# Patient Record
Sex: MALE | Race: WHITE | Employment: OTHER | URBAN - METROPOLITAN AREA
[De-identification: names, ages, dates, MRNs, and addresses within clinical notes are randomized per-mention and may not be internally consistent; named-entity substitution may affect disease eponyms.]

---

## 2017-01-04 ENCOUNTER — TELEPHONE (OUTPATIENT)
Dept: CARDIOLOGY CLINIC | Age: 67
End: 2017-01-04

## 2017-01-04 DIAGNOSIS — B35.4 TINEA CORPORIS: ICD-10-CM

## 2017-01-04 DIAGNOSIS — I10 ESSENTIAL HYPERTENSION, BENIGN: ICD-10-CM

## 2017-01-04 DIAGNOSIS — E78.2 MIXED HYPERLIPIDEMIA: ICD-10-CM

## 2017-01-04 DIAGNOSIS — I25.10 ATHEROSCLEROSIS OF NATIVE CORONARY ARTERY OF NATIVE HEART WITHOUT ANGINA PECTORIS: ICD-10-CM

## 2017-01-04 DIAGNOSIS — K29.70 GASTRITIS: ICD-10-CM

## 2017-01-04 RX ORDER — SIMVASTATIN 80 MG
TABLET ORAL
Qty: 90 TABLET | Refills: 3 | Status: CANCELLED | OUTPATIENT
Start: 2017-01-04

## 2017-01-05 RX ORDER — ATORVASTATIN CALCIUM 80 MG/1
80 TABLET, FILM COATED ORAL DAILY
Qty: 30 TABLET | Refills: 3 | Status: SHIPPED | OUTPATIENT
Start: 2017-01-05 | End: 2017-01-10 | Stop reason: SDUPTHER

## 2017-01-06 ENCOUNTER — TELEPHONE (OUTPATIENT)
Dept: CARDIOLOGY CLINIC | Age: 67
End: 2017-01-06

## 2017-01-10 ENCOUNTER — TELEPHONE (OUTPATIENT)
Dept: CARDIOLOGY CLINIC | Age: 67
End: 2017-01-10

## 2017-01-10 ENCOUNTER — PATIENT MESSAGE (OUTPATIENT)
Dept: FAMILY MEDICINE CLINIC | Age: 67
End: 2017-01-10

## 2017-01-10 DIAGNOSIS — I25.10 ATHEROSCLEROSIS OF NATIVE CORONARY ARTERY OF NATIVE HEART, ANGINA PRESENCE UNSPECIFIED: ICD-10-CM

## 2017-01-10 DIAGNOSIS — K21.9 GASTROESOPHAGEAL REFLUX DISEASE WITHOUT ESOPHAGITIS: ICD-10-CM

## 2017-01-10 DIAGNOSIS — E78.5 HYPERLIPIDEMIA, UNSPECIFIED HYPERLIPIDEMIA TYPE: Primary | ICD-10-CM

## 2017-01-10 RX ORDER — ATORVASTATIN CALCIUM 80 MG/1
80 TABLET, FILM COATED ORAL DAILY
Qty: 30 TABLET | Refills: 3 | Status: SHIPPED | OUTPATIENT
Start: 2017-01-10 | End: 2017-05-10 | Stop reason: SDUPTHER

## 2017-01-11 RX ORDER — LANSOPRAZOLE 30 MG/1
CAPSULE, DELAYED RELEASE ORAL
Qty: 90 CAPSULE | Refills: 3 | Status: SHIPPED | OUTPATIENT
Start: 2017-01-11 | End: 2019-01-17 | Stop reason: SDUPTHER

## 2017-01-20 DIAGNOSIS — I10 UNSPECIFIED ESSENTIAL HYPERTENSION: ICD-10-CM

## 2017-01-20 RX ORDER — ATENOLOL 100 MG/1
TABLET ORAL
Qty: 90 TABLET | Refills: 3 | Status: SHIPPED | OUTPATIENT
Start: 2017-01-20 | End: 2018-01-04 | Stop reason: SDUPTHER

## 2017-05-10 DIAGNOSIS — E78.5 HYPERLIPIDEMIA, UNSPECIFIED HYPERLIPIDEMIA TYPE: ICD-10-CM

## 2017-05-10 DIAGNOSIS — I25.10 ATHEROSCLEROSIS OF NATIVE CORONARY ARTERY OF NATIVE HEART, ANGINA PRESENCE UNSPECIFIED: ICD-10-CM

## 2017-05-11 RX ORDER — ATORVASTATIN CALCIUM 80 MG/1
TABLET, FILM COATED ORAL
Qty: 30 TABLET | Refills: 2 | Status: SHIPPED | OUTPATIENT
Start: 2017-05-11 | End: 2017-06-20 | Stop reason: SDUPTHER

## 2017-06-12 ENCOUNTER — TELEPHONE (OUTPATIENT)
Dept: CARDIOLOGY CLINIC | Age: 67
End: 2017-06-12

## 2017-06-14 DIAGNOSIS — E78.5 HYPERLIPIDEMIA, UNSPECIFIED HYPERLIPIDEMIA TYPE: ICD-10-CM

## 2017-06-14 DIAGNOSIS — I10 ESSENTIAL HYPERTENSION, BENIGN: ICD-10-CM

## 2017-06-14 DIAGNOSIS — I25.10 ATHEROSCLEROSIS OF NATIVE CORONARY ARTERY OF NATIVE HEART, ANGINA PRESENCE UNSPECIFIED: ICD-10-CM

## 2017-06-14 LAB
ALT SERPL-CCNC: 42 U/L (ref 10–40)
ANION GAP SERPL CALCULATED.3IONS-SCNC: 17 MMOL/L (ref 3–16)
AST SERPL-CCNC: 35 U/L (ref 15–37)
BUN BLDV-MCNC: 16 MG/DL (ref 7–20)
CALCIUM SERPL-MCNC: 9.9 MG/DL (ref 8.3–10.6)
CHLORIDE BLD-SCNC: 99 MMOL/L (ref 99–110)
CHOLESTEROL, TOTAL: 136 MG/DL (ref 0–199)
CO2: 23 MMOL/L (ref 21–32)
CREAT SERPL-MCNC: 1 MG/DL (ref 0.8–1.3)
GFR AFRICAN AMERICAN: >60
GFR NON-AFRICAN AMERICAN: >60
GLUCOSE BLD-MCNC: 107 MG/DL (ref 70–99)
HDLC SERPL-MCNC: 55 MG/DL (ref 40–60)
LDL CHOLESTEROL CALCULATED: 65 MG/DL
POTASSIUM SERPL-SCNC: 5.1 MMOL/L (ref 3.5–5.1)
SODIUM BLD-SCNC: 139 MMOL/L (ref 136–145)
TRIGL SERPL-MCNC: 80 MG/DL (ref 0–150)
VLDLC SERPL CALC-MCNC: 16 MG/DL

## 2017-06-19 ENCOUNTER — TELEPHONE (OUTPATIENT)
Dept: CARDIOLOGY CLINIC | Age: 67
End: 2017-06-19

## 2017-06-20 ENCOUNTER — OFFICE VISIT (OUTPATIENT)
Dept: CARDIOLOGY CLINIC | Age: 67
End: 2017-06-20

## 2017-06-20 ENCOUNTER — HOSPITAL ENCOUNTER (OUTPATIENT)
Dept: OTHER | Age: 67
Discharge: HOME | End: 2017-06-20

## 2017-06-20 ENCOUNTER — HOSPITAL ENCOUNTER (OUTPATIENT)
Dept: NON INVASIVE DIAGNOSTICS | Age: 67
Discharge: HOME | End: 2017-06-20
Attending: INTERNAL MEDICINE | Admitting: INTERNAL MEDICINE

## 2017-06-20 VITALS
WEIGHT: 242.3 LBS | OXYGEN SATURATION: 94 % | HEART RATE: 68 BPM | BODY MASS INDEX: 33.92 KG/M2 | HEIGHT: 71 IN | SYSTOLIC BLOOD PRESSURE: 104 MMHG | DIASTOLIC BLOOD PRESSURE: 64 MMHG

## 2017-06-20 DIAGNOSIS — E78.5 HYPERLIPIDEMIA, UNSPECIFIED HYPERLIPIDEMIA TYPE: ICD-10-CM

## 2017-06-20 DIAGNOSIS — I25.10 ATHEROSCLEROSIS OF NATIVE CORONARY ARTERY OF NATIVE HEART, ANGINA PRESENCE UNSPECIFIED: ICD-10-CM

## 2017-06-20 DIAGNOSIS — I25.10 ATHEROSCLEROTIC HEART DISEASE OF NATIVE CORONARY ARTERY WITHOUT ANGINA PECTORIS: ICD-10-CM

## 2017-06-20 DIAGNOSIS — Z13.6 SCREENING FOR AAA (ABDOMINAL AORTIC ANEURYSM): Primary | ICD-10-CM

## 2017-06-20 PROCEDURE — 99213 OFFICE O/P EST LOW 20 MIN: CPT | Performed by: INTERNAL MEDICINE

## 2017-06-20 RX ORDER — ATORVASTATIN CALCIUM 80 MG/1
TABLET, FILM COATED ORAL
Qty: 90 TABLET | Refills: 3 | Status: SHIPPED | OUTPATIENT
Start: 2017-06-20 | End: 2017-08-07 | Stop reason: SDUPTHER

## 2017-06-23 ENCOUNTER — HOSPITAL ENCOUNTER (OUTPATIENT)
Dept: ULTRASOUND IMAGING | Age: 67
Discharge: HOME | End: 2017-06-23
Attending: INTERNAL MEDICINE | Admitting: INTERNAL MEDICINE

## 2017-06-23 DIAGNOSIS — Z13.6 SCREENING FOR ABDOMINAL AORTIC ANEURYSM: ICD-10-CM

## 2017-06-23 DIAGNOSIS — Z13.6 ENCOUNTER FOR SCREENING FOR CARDIOVASCULAR DISORDERS: ICD-10-CM

## 2017-06-24 ENCOUNTER — HOSPITAL ENCOUNTER (OUTPATIENT)
Dept: OTHER | Age: 67
Discharge: HOME | End: 2017-06-24
Attending: INTERNAL MEDICINE | Admitting: INTERNAL MEDICINE

## 2017-06-24 ENCOUNTER — HOSPITAL ENCOUNTER (OUTPATIENT)
Dept: ULTRASOUND IMAGING | Age: 67
Discharge: HOME | End: 2017-06-24
Admitting: INTERNAL MEDICINE

## 2017-06-24 DIAGNOSIS — Z13.6 ENCOUNTER FOR SCREENING FOR CARDIOVASCULAR DISORDERS: ICD-10-CM

## 2017-06-24 DIAGNOSIS — Z13.6 SCREENING FOR ABDOMINAL AORTIC ANEURYSM: ICD-10-CM

## 2017-07-05 ENCOUNTER — TELEPHONE (OUTPATIENT)
Dept: CARDIOLOGY CLINIC | Age: 67
End: 2017-07-05

## 2017-08-07 DIAGNOSIS — I25.10 ATHEROSCLEROSIS OF NATIVE CORONARY ARTERY OF NATIVE HEART, ANGINA PRESENCE UNSPECIFIED: ICD-10-CM

## 2017-08-07 DIAGNOSIS — E78.5 HYPERLIPIDEMIA, UNSPECIFIED HYPERLIPIDEMIA TYPE: ICD-10-CM

## 2017-08-07 RX ORDER — ATORVASTATIN CALCIUM 80 MG/1
TABLET, FILM COATED ORAL
Qty: 90 TABLET | Refills: 3 | Status: SHIPPED | OUTPATIENT
Start: 2017-08-07 | End: 2018-07-31 | Stop reason: SDUPTHER

## 2017-11-01 DIAGNOSIS — I10 ESSENTIAL HYPERTENSION: ICD-10-CM

## 2017-11-03 RX ORDER — AMLODIPINE BESYLATE 5 MG/1
TABLET ORAL
Qty: 90 TABLET | Refills: 3 | Status: SHIPPED | OUTPATIENT
Start: 2017-11-03 | End: 2018-11-06 | Stop reason: SDUPTHER

## 2017-11-03 RX ORDER — LISINOPRIL 40 MG/1
TABLET ORAL
Qty: 90 TABLET | Refills: 3 | Status: SHIPPED | OUTPATIENT
Start: 2017-11-03 | End: 2018-10-28 | Stop reason: SDUPTHER

## 2018-01-04 DIAGNOSIS — I10 ESSENTIAL HYPERTENSION: ICD-10-CM

## 2018-01-05 RX ORDER — ATENOLOL 100 MG/1
TABLET ORAL
Qty: 90 TABLET | Refills: 3 | Status: SHIPPED | OUTPATIENT
Start: 2018-01-05 | End: 2018-12-16 | Stop reason: SDUPTHER

## 2018-07-31 DIAGNOSIS — I25.10 ATHEROSCLEROSIS OF NATIVE CORONARY ARTERY OF NATIVE HEART, ANGINA PRESENCE UNSPECIFIED: ICD-10-CM

## 2018-07-31 DIAGNOSIS — E78.5 HYPERLIPIDEMIA, UNSPECIFIED HYPERLIPIDEMIA TYPE: ICD-10-CM

## 2018-07-31 RX ORDER — ATORVASTATIN CALCIUM 80 MG/1
40 TABLET, FILM COATED ORAL DAILY
Qty: 90 TABLET | Refills: 2 | Status: SHIPPED | OUTPATIENT
Start: 2018-07-31 | End: 2019-10-18 | Stop reason: SDUPTHER

## 2018-08-07 DIAGNOSIS — E78.5 HYPERLIPIDEMIA, UNSPECIFIED HYPERLIPIDEMIA TYPE: Primary | ICD-10-CM

## 2018-10-09 ENCOUNTER — OFFICE VISIT (OUTPATIENT)
Dept: FAMILY MEDICINE CLINIC | Age: 68
End: 2018-10-09
Payer: MEDICARE

## 2018-10-09 VITALS
HEIGHT: 71 IN | WEIGHT: 251.6 LBS | SYSTOLIC BLOOD PRESSURE: 110 MMHG | HEART RATE: 59 BPM | TEMPERATURE: 97.6 F | DIASTOLIC BLOOD PRESSURE: 62 MMHG | BODY MASS INDEX: 35.22 KG/M2 | OXYGEN SATURATION: 96 %

## 2018-10-09 DIAGNOSIS — J06.9 PROTRACTED URI: Primary | ICD-10-CM

## 2018-10-09 PROCEDURE — 3288F FALL RISK ASSESSMENT DOCD: CPT | Performed by: NURSE PRACTITIONER

## 2018-10-09 PROCEDURE — G8510 SCR DEP NEG, NO PLAN REQD: HCPCS | Performed by: NURSE PRACTITIONER

## 2018-10-09 PROCEDURE — 99213 OFFICE O/P EST LOW 20 MIN: CPT | Performed by: NURSE PRACTITIONER

## 2018-10-09 RX ORDER — AZITHROMYCIN 250 MG/1
TABLET, FILM COATED ORAL
Qty: 1 PACKET | Refills: 0 | Status: SHIPPED | OUTPATIENT
Start: 2018-10-09 | End: 2018-10-17 | Stop reason: ALTCHOICE

## 2018-10-09 ASSESSMENT — PATIENT HEALTH QUESTIONNAIRE - PHQ9
SUM OF ALL RESPONSES TO PHQ9 QUESTIONS 1 & 2: 0
SUM OF ALL RESPONSES TO PHQ QUESTIONS 1-9: 0
1. LITTLE INTEREST OR PLEASURE IN DOING THINGS: 0
2. FEELING DOWN, DEPRESSED OR HOPELESS: 0
SUM OF ALL RESPONSES TO PHQ QUESTIONS 1-9: 0

## 2018-10-09 ASSESSMENT — ENCOUNTER SYMPTOMS
RHINORRHEA: 1
SORE THROAT: 1
WHEEZING: 0
SHORTNESS OF BREATH: 0
COUGH: 1
CHEST TIGHTNESS: 0
SINUS PRESSURE: 1
VOICE CHANGE: 1

## 2018-10-17 ENCOUNTER — OFFICE VISIT (OUTPATIENT)
Dept: CARDIOLOGY CLINIC | Age: 68
End: 2018-10-17
Payer: MEDICARE

## 2018-10-17 VITALS
RESPIRATION RATE: 14 BRPM | WEIGHT: 253 LBS | SYSTOLIC BLOOD PRESSURE: 112 MMHG | BODY MASS INDEX: 35.42 KG/M2 | DIASTOLIC BLOOD PRESSURE: 76 MMHG | HEART RATE: 50 BPM | HEIGHT: 71 IN | OXYGEN SATURATION: 95 %

## 2018-10-17 DIAGNOSIS — I10 ESSENTIAL HYPERTENSION, BENIGN: ICD-10-CM

## 2018-10-17 DIAGNOSIS — E78.5 HYPERLIPIDEMIA, UNSPECIFIED HYPERLIPIDEMIA TYPE: ICD-10-CM

## 2018-10-17 DIAGNOSIS — I25.10 ATHEROSCLEROSIS OF NATIVE CORONARY ARTERY OF NATIVE HEART, ANGINA PRESENCE UNSPECIFIED: Primary | ICD-10-CM

## 2018-10-17 PROCEDURE — 99213 OFFICE O/P EST LOW 20 MIN: CPT | Performed by: INTERNAL MEDICINE

## 2018-10-17 NOTE — PROGRESS NOTES
reports that he does not use drugs. Family History:  family history includes Cancer in his father; Heart Attack in his mother; Heart Disease in his father and mother; High Blood Pressure in his father and mother; High Cholesterol in his father and mother; Hypertension in his father. Home Medications:  Current Outpatient Prescriptions   Medication Sig Dispense Refill    atorvastatin (LIPITOR) 80 MG tablet Take 0.5 tablets by mouth daily 90 tablet 2    atenolol (TENORMIN) 100 MG tablet TAKE 1 TABLET BY MOUTH EVERY DAY 90 tablet 3    lisinopril (PRINIVIL;ZESTRIL) 40 MG tablet TAKE 1 TABLET BY MOUTH EVERY DAY 90 tablet 3    amLODIPine (NORVASC) 5 MG tablet TAKE 1 TABLET BY MOUTH DAILY 90 tablet 3    lansoprazole (PREVACID) 30 MG delayed release capsule TAKE ONE CAPSULE BY MOUTH EVERY DAY 90 capsule 3    cetirizine (ZYRTEC) 10 MG tablet Take 10 mg by mouth every evening       therapeutic multivitamin-minerals (THERAGRAN-M) tablet Take 1 tablet by mouth daily.  aspirin 81 MG EC tablet Take 81 mg by mouth daily. No current facility-administered medications for this visit. Allergies:  Adhesive tape     Review of Systems:   · Constitutional: there has been no unanticipated weight loss. There's been no change in energy level, sleep pattern, or activity level. · Eyes: No visual changes or diplopia. No scleral icterus. · ENT: No Headaches, hearing loss or vertigo. No mouth sores or sore throat. · Cardiovascular: Reviewed in HPI  · Respiratory: No cough or wheezing, no sputum production. No hematemesis. · Gastrointestinal: No abdominal pain, appetite loss, blood in stools. No change in bowel or bladder habits. · Genitourinary: No dysuria, trouble voiding, or hematuria. · Musculoskeletal:  No gait disturbance, weakness or joint complaints. · Integumentary: No rash or pruritis. · Neurological: No headache, diplopia, change in muscle strength, numbness or tingling.  No change in gait, changes  4. Follow up in 12 months. Thank you for allowing me to participate in the care of this individual.    Angelica Lopez M.D., [address], [address] attestation: This note was scribed in the presence of Dr. Venessa Gloria MD, by Andres Clinton RN. Provider Geoffrey Tran is working as a scribe for and in the presence of ekny Ortiz MD). Working as a scribe, Sandipmiriam Dill may have prepopulated components of this note with my historical  intellectual property under my direct supervision. Any additions to this intellectual property were performed in my presence and at my direction. Furthermore, the content and accuracy of this note have been reviewed by keny Ortiz MD).

## 2018-10-17 NOTE — LETTER
No abnormalities of mood, affect, memory      Assessment:     Other and unspecified hyperlipidemia  1/2016: Total 128 HDL 51 LDL 51 Trig 132  Lipids and Liver in 3 months    Essential hypertension, benign  /76 (Site: Right Upper Arm, Position: Sitting, Cuff Size: Medium Adult)   Pulse 50   Resp 14   Ht 5' 11\" (1.803 m)   Wt 253 lb (114.8 kg)   SpO2 95%   BMI 35.29 kg/m²   Stable. BMP in 3 months    Coronary Artery Disease  Hx of IWMI with RCA stenting 01/2000.    1/11/12  myoview fixed defect, normal EF. Plan:  1. Advised to increase exercise in half-way  2. Will have fasting lipids, liver enzymes, BMP  3. No med changes  4. Follow up in 12 months. Thank you for allowing me to participate in the care of this individual.    Tabitha Reynolds M.D., [facility], [address] [facility] attestation: This note was scribed in the presence of Dr. Srinivasan Mayberry MD, by Malachi Montaño RN. Provider Arley Lloyd is working as a scribe for and in the presence of me Augustus Talavera MD). Working as a scribe, Devon Benoit may have prepopulated components of this note with my historical  intellectual property under my direct supervision. Any additions to this intellectual property were performed in my presence and at my direction. Furthermore, the content and accuracy of this note have been reviewed by me Augustus Talavera MD). If you have questions, please do not hesitate to call me. I look forward to following Jarrod Mobley along with you.     Sincerely,        Tabitha Reynolds MD

## 2018-10-28 DIAGNOSIS — I10 ESSENTIAL HYPERTENSION: ICD-10-CM

## 2018-10-29 RX ORDER — LISINOPRIL 40 MG/1
TABLET ORAL
Qty: 90 TABLET | Refills: 3 | Status: SHIPPED | OUTPATIENT
Start: 2018-10-29 | End: 2018-11-20 | Stop reason: SDUPTHER

## 2018-11-06 RX ORDER — AMLODIPINE BESYLATE 5 MG/1
5 TABLET ORAL DAILY
Qty: 90 TABLET | Refills: 0 | Status: SHIPPED | OUTPATIENT
Start: 2018-11-06 | End: 2018-11-20 | Stop reason: SDUPTHER

## 2018-11-10 ENCOUNTER — HOSPITAL ENCOUNTER (OUTPATIENT)
Age: 68
Discharge: HOME | End: 2018-11-10
Payer: MEDICARE

## 2018-11-10 DIAGNOSIS — E78.5 HYPERLIPIDEMIA, UNSPECIFIED HYPERLIPIDEMIA TYPE: ICD-10-CM

## 2018-11-10 LAB
ALT SERPL-CCNC: 31 U/L (ref 10–40)
AST SERPL-CCNC: 29 U/L (ref 15–37)
CHOLESTEROL, TOTAL: 131 MG/DL (ref 0–199)
HDLC SERPL-MCNC: 49 MG/DL (ref 40–60)
LDL CHOLESTEROL CALCULATED: 66 MG/DL
TRIGL SERPL-MCNC: 80 MG/DL (ref 0–150)
VLDLC SERPL CALC-MCNC: 16 MG/DL

## 2018-11-10 PROCEDURE — 36415 COLL VENOUS BLD VENIPUNCTURE: CPT

## 2018-11-10 PROCEDURE — 80061 LIPID PANEL: CPT

## 2018-11-10 PROCEDURE — 84450 TRANSFERASE (AST) (SGOT): CPT

## 2018-11-10 PROCEDURE — 84460 ALANINE AMINO (ALT) (SGPT): CPT

## 2018-11-20 DIAGNOSIS — I10 ESSENTIAL HYPERTENSION: ICD-10-CM

## 2018-11-20 RX ORDER — AMLODIPINE BESYLATE 5 MG/1
5 TABLET ORAL DAILY
Qty: 90 TABLET | Refills: 3 | Status: SHIPPED | OUTPATIENT
Start: 2018-11-20 | End: 2019-01-17 | Stop reason: SDUPTHER

## 2018-11-20 RX ORDER — LISINOPRIL 40 MG/1
40 TABLET ORAL DAILY
Qty: 90 TABLET | Refills: 3 | Status: SHIPPED | OUTPATIENT
Start: 2018-11-20 | End: 2020-02-04

## 2018-12-16 DIAGNOSIS — I10 ESSENTIAL HYPERTENSION: ICD-10-CM

## 2018-12-17 RX ORDER — ATENOLOL 100 MG/1
TABLET ORAL
Qty: 90 TABLET | Refills: 3 | Status: SHIPPED | OUTPATIENT
Start: 2018-12-17 | End: 2019-10-18 | Stop reason: SDUPTHER

## 2018-12-17 NOTE — TELEPHONE ENCOUNTER
Last appt on 10/17/2018    Told to follow up in 1 year    11/10/2018  3:53 PM - Karlee Lawton Incoming Lab Results From Soft (Epic Adt)     Component Results     Component Value Ref Range & Units Status Collected Lab   Cholesterol, Total 131  0 - 199 mg/dL Final 11/10/2018  7:21 AM [address] Lab   Triglycerides 80  0 - 150 mg/dL Final 11/10/2018  7:21 AM [address] Lab   HDL 49  40 - 60 mg/dL Final 11/10/2018  7:21 AM [address] Lab   LDL Calculated 66  <100 mg/dL Final 11/10/2018  7:21 AM [facility]   VLDL Cholesterol Calculated 16  Not Established mg/dL Final 11/10/2018  7:21 AM [address] Lab   Testing Performed By     Isis Leon Name Director Address Valid Date Range   19- - Aureliano [facility] Santiago Martino M.D., Ph.D. [facility].   [facility] [id number] 08/30/17 1217-Present     11/10/2018  3:53 PM - Karlee Lawton Incoming Lab Results From Soft (Epic Adt)     Component Results     Component Value Ref Range & Units Status Collected Lab   AST 29  15 - 37 U/L Final 11/10/2018  7:21 AM [address] Lab     11/10/2018  3:53 PM - Karlee Lawton Incoming Lab Results From Soft (Epic Adt)     Component Results     Component Value Ref Range & Units Status Collected Lab   ALT 31  10 - 40 U/L Final 11/10/2018  7:21 AM [address] Lab

## 2018-12-18 ENCOUNTER — OFFICE VISIT (OUTPATIENT)
Dept: FAMILY MEDICINE CLINIC | Age: 68
End: 2018-12-18
Payer: MEDICARE

## 2018-12-18 VITALS
BODY MASS INDEX: 35.68 KG/M2 | DIASTOLIC BLOOD PRESSURE: 78 MMHG | WEIGHT: 255.8 LBS | TEMPERATURE: 99.3 F | OXYGEN SATURATION: 98 % | SYSTOLIC BLOOD PRESSURE: 118 MMHG | HEART RATE: 66 BPM

## 2018-12-18 DIAGNOSIS — B96.89 ACUTE BACTERIAL SINUSITIS: Primary | ICD-10-CM

## 2018-12-18 DIAGNOSIS — J01.90 ACUTE BACTERIAL SINUSITIS: Primary | ICD-10-CM

## 2018-12-18 PROCEDURE — 99213 OFFICE O/P EST LOW 20 MIN: CPT | Performed by: NURSE PRACTITIONER

## 2018-12-18 RX ORDER — AZITHROMYCIN 250 MG/1
TABLET, FILM COATED ORAL
Qty: 1 PACKET | Refills: 0 | Status: SHIPPED | OUTPATIENT
Start: 2018-12-18 | End: 2019-01-17

## 2018-12-18 ASSESSMENT — ENCOUNTER SYMPTOMS
COUGH: 1
SINUS COMPLAINT: 1
WHEEZING: 0
SORE THROAT: 1
CHEST TIGHTNESS: 1
SHORTNESS OF BREATH: 1
SINUS PAIN: 1
SINUS PRESSURE: 1
RHINORRHEA: 1

## 2018-12-18 NOTE — PROGRESS NOTES
sore throat. Respiratory: Positive for cough, chest tightness (first, since resolved) and shortness of breath (first night, since resolved). Negative for wheezing. Neurological: Positive for headaches. Negative for dizziness. Physical Exam   Constitutional: He is oriented to person, place, and time. He appears well-developed and well-nourished. HENT:   Right Ear: Tympanic membrane normal.   Left Ear: Tympanic membrane normal.   Nose: Nose normal.   Mouth/Throat: Uvula is midline and mucous membranes are normal. Posterior oropharyngeal erythema present. Cardiovascular: Normal rate, regular rhythm and normal heart sounds. Pulmonary/Chest: Effort normal and breath sounds normal.   Lymphadenopathy:     He has no cervical adenopathy. Neurological: He is alert and oriented to person, place, and time. Skin: Skin is warm and dry. Vitals reviewed. /78   Pulse 66   Temp 99.3 °F (37.4 °C)   Wt 255 lb 12.8 oz (116 kg)   SpO2 98%   BMI 35.68 kg/m²       Assessment/Plan    1. Acute bacterial sinusitis  - azithromycin (ZITHROMAX) 250 MG tablet; Take 2 tabs (500 mg) on Day 1, and take 1 tab (250 mg) on days 2 through 5. Dispense: 1 packet; Refill: 0  Symtomatic treatment: Tylenol / Advil, rest, salt water gargles, increase fluids. May also use:Robitussin DM or Delsym. .  See pt instructions  F/u 5-7 days if no improvement, sooner if symptoms worsen. Discussed use, benefit, and side effects of prescribed medications. All patient questions answered. Pt voiced understanding.

## 2019-01-02 ENCOUNTER — TELEPHONE (OUTPATIENT)
Dept: FAMILY MEDICINE CLINIC | Age: 69
End: 2019-01-02

## 2019-01-02 DIAGNOSIS — I10 ESSENTIAL HYPERTENSION, BENIGN: ICD-10-CM

## 2019-01-02 DIAGNOSIS — Z00.00 WELL ADULT EXAM: ICD-10-CM

## 2019-01-02 DIAGNOSIS — Z12.5 SCREENING FOR PROSTATE CANCER: Primary | ICD-10-CM

## 2019-01-02 DIAGNOSIS — R39.11 URINARY HESITANCY: ICD-10-CM

## 2019-01-11 DIAGNOSIS — I10 ESSENTIAL HYPERTENSION, BENIGN: ICD-10-CM

## 2019-01-11 DIAGNOSIS — R39.11 URINARY HESITANCY: ICD-10-CM

## 2019-01-11 DIAGNOSIS — Z12.5 SCREENING FOR PROSTATE CANCER: ICD-10-CM

## 2019-01-11 LAB
A/G RATIO: 1.6 (ref 1.1–2.2)
ALBUMIN SERPL-MCNC: 4.4 G/DL (ref 3.4–5)
ALP BLD-CCNC: 73 U/L (ref 40–129)
ALT SERPL-CCNC: 36 U/L (ref 10–40)
ANION GAP SERPL CALCULATED.3IONS-SCNC: 13 MMOL/L (ref 3–16)
AST SERPL-CCNC: 25 U/L (ref 15–37)
BILIRUB SERPL-MCNC: 0.7 MG/DL (ref 0–1)
BUN BLDV-MCNC: 12 MG/DL (ref 7–20)
CALCIUM SERPL-MCNC: 9.6 MG/DL (ref 8.3–10.6)
CHLORIDE BLD-SCNC: 98 MMOL/L (ref 99–110)
CHOLESTEROL, TOTAL: 140 MG/DL (ref 0–199)
CO2: 26 MMOL/L (ref 21–32)
CREAT SERPL-MCNC: 0.9 MG/DL (ref 0.8–1.3)
GFR AFRICAN AMERICAN: >60
GFR NON-AFRICAN AMERICAN: >60
GLOBULIN: 2.8 G/DL
GLUCOSE BLD-MCNC: 97 MG/DL (ref 70–99)
HDLC SERPL-MCNC: 52 MG/DL (ref 40–60)
LDL CHOLESTEROL CALCULATED: 65 MG/DL
POTASSIUM SERPL-SCNC: 5.1 MMOL/L (ref 3.5–5.1)
PROSTATE SPECIFIC ANTIGEN: 0.52 NG/ML (ref 0–4)
SODIUM BLD-SCNC: 137 MMOL/L (ref 136–145)
TOTAL PROTEIN: 7.2 G/DL (ref 6.4–8.2)
TRIGL SERPL-MCNC: 115 MG/DL (ref 0–150)
TSH REFLEX: 1.55 UIU/ML (ref 0.27–4.2)
VLDLC SERPL CALC-MCNC: 23 MG/DL

## 2019-01-14 LAB
ATYPICAL LYMPHOCYTE RELATIVE PERCENT: 1 % (ref 0–6)
BASOPHILS ABSOLUTE: 0.1 K/UL (ref 0–0.2)
BASOPHILS RELATIVE PERCENT: 1 %
EOSINOPHILS ABSOLUTE: 0.1 K/UL (ref 0–0.6)
EOSINOPHILS RELATIVE PERCENT: 1 %
HCT VFR BLD CALC: 45.5 % (ref 40.5–52.5)
HEMATOLOGY PATH CONSULT: NO
HEMOGLOBIN: 15.1 G/DL (ref 13.5–17.5)
LYMPHOCYTES ABSOLUTE: 5.7 K/UL (ref 1–5.1)
LYMPHOCYTES RELATIVE PERCENT: 48 %
MCH RBC QN AUTO: 30.2 PG (ref 26–34)
MCHC RBC AUTO-ENTMCNC: 33.3 G/DL (ref 31–36)
MCV RBC AUTO: 90.7 FL (ref 80–100)
MONOCYTES ABSOLUTE: 0.9 K/UL (ref 0–1.3)
MONOCYTES RELATIVE PERCENT: 8 %
NEUTROPHILS ABSOLUTE: 4.8 K/UL (ref 1.7–7.7)
NEUTROPHILS RELATIVE PERCENT: 41 %
PDW BLD-RTO: 14.2 % (ref 12.4–15.4)
PLATELET # BLD: 303 K/UL (ref 135–450)
PMV BLD AUTO: 9.3 FL (ref 5–10.5)
RBC # BLD: 5.01 M/UL (ref 4.2–5.9)
SLIDE REVIEW: ABNORMAL
WBC # BLD: 11.7 K/UL (ref 4–11)

## 2019-01-17 ENCOUNTER — OFFICE VISIT (OUTPATIENT)
Dept: FAMILY MEDICINE CLINIC | Age: 69
End: 2019-01-17
Payer: MEDICARE

## 2019-01-17 VITALS
SYSTOLIC BLOOD PRESSURE: 140 MMHG | BODY MASS INDEX: 35.7 KG/M2 | DIASTOLIC BLOOD PRESSURE: 88 MMHG | HEART RATE: 54 BPM | OXYGEN SATURATION: 96 % | WEIGHT: 255 LBS | HEIGHT: 71 IN

## 2019-01-17 DIAGNOSIS — Z23 NEED FOR PROPHYLACTIC VACCINATION AGAINST STREPTOCOCCUS PNEUMONIAE (PNEUMOCOCCUS): ICD-10-CM

## 2019-01-17 DIAGNOSIS — G47.33 OSA (OBSTRUCTIVE SLEEP APNEA): ICD-10-CM

## 2019-01-17 DIAGNOSIS — I25.10 ATHEROSCLEROSIS OF NATIVE CORONARY ARTERY OF NATIVE HEART, ANGINA PRESENCE UNSPECIFIED: ICD-10-CM

## 2019-01-17 DIAGNOSIS — Z00.00 ANNUAL PHYSICAL EXAM: Primary | ICD-10-CM

## 2019-01-17 DIAGNOSIS — I10 ESSENTIAL HYPERTENSION, BENIGN: ICD-10-CM

## 2019-01-17 DIAGNOSIS — E66.9 OBESITY (BMI 30.0-34.9): ICD-10-CM

## 2019-01-17 DIAGNOSIS — K21.9 GASTROESOPHAGEAL REFLUX DISEASE WITHOUT ESOPHAGITIS: ICD-10-CM

## 2019-01-17 LAB
BILIRUBIN, POC: NORMAL
BLOOD URINE, POC: NORMAL
CLARITY, POC: CLEAR
COLOR, POC: YELLOW
GLUCOSE URINE, POC: NORMAL
KETONES, POC: NORMAL
LEUKOCYTE EST, POC: NORMAL
NITRITE, POC: NORMAL
PH, POC: 6
PROTEIN, POC: NORMAL
SPECIFIC GRAVITY, POC: 1.02
UROBILINOGEN, POC: 0.2

## 2019-01-17 PROCEDURE — 90732 PPSV23 VACC 2 YRS+ SUBQ/IM: CPT | Performed by: FAMILY MEDICINE

## 2019-01-17 PROCEDURE — G0009 ADMIN PNEUMOCOCCAL VACCINE: HCPCS | Performed by: FAMILY MEDICINE

## 2019-01-17 PROCEDURE — 81002 URINALYSIS NONAUTO W/O SCOPE: CPT | Performed by: FAMILY MEDICINE

## 2019-01-17 PROCEDURE — 99214 OFFICE O/P EST MOD 30 MIN: CPT | Performed by: FAMILY MEDICINE

## 2019-01-17 RX ORDER — AMLODIPINE BESYLATE 5 MG/1
5 TABLET ORAL DAILY
Qty: 90 TABLET | Refills: 3 | Status: SHIPPED | OUTPATIENT
Start: 2019-01-17 | End: 2019-02-01 | Stop reason: SDUPTHER

## 2019-01-17 RX ORDER — LANSOPRAZOLE 30 MG/1
CAPSULE, DELAYED RELEASE ORAL
Qty: 90 CAPSULE | Refills: 3 | Status: SHIPPED | OUTPATIENT
Start: 2019-01-17 | End: 2019-10-21 | Stop reason: SDUPTHER

## 2019-02-01 RX ORDER — AMLODIPINE BESYLATE 5 MG/1
5 TABLET ORAL DAILY
Qty: 90 TABLET | Refills: 3 | Status: SHIPPED | OUTPATIENT
Start: 2019-02-01 | End: 2020-04-07

## 2019-02-27 ENCOUNTER — OFFICE VISIT (OUTPATIENT)
Dept: FAMILY MEDICINE CLINIC | Age: 69
End: 2019-02-27
Payer: MEDICARE

## 2019-02-27 VITALS
SYSTOLIC BLOOD PRESSURE: 138 MMHG | HEART RATE: 60 BPM | TEMPERATURE: 98.6 F | DIASTOLIC BLOOD PRESSURE: 88 MMHG | HEIGHT: 71 IN | WEIGHT: 256 LBS | OXYGEN SATURATION: 96 % | BODY MASS INDEX: 35.84 KG/M2

## 2019-02-27 DIAGNOSIS — L91.8 INFLAMED SKIN TAG: Primary | ICD-10-CM

## 2019-02-27 PROCEDURE — 99211 OFF/OP EST MAY X REQ PHY/QHP: CPT | Performed by: FAMILY MEDICINE

## 2019-02-27 PROCEDURE — 11200 RMVL SKIN TAGS UP TO&INC 15: CPT | Performed by: FAMILY MEDICINE

## 2019-02-27 PROCEDURE — G8510 SCR DEP NEG, NO PLAN REQD: HCPCS | Performed by: FAMILY MEDICINE

## 2019-02-27 ASSESSMENT — PATIENT HEALTH QUESTIONNAIRE - PHQ9
SUM OF ALL RESPONSES TO PHQ QUESTIONS 1-9: 0
SUM OF ALL RESPONSES TO PHQ9 QUESTIONS 1 & 2: 0
SUM OF ALL RESPONSES TO PHQ QUESTIONS 1-9: 0
1. LITTLE INTEREST OR PLEASURE IN DOING THINGS: 0
SUM OF ALL RESPONSES TO PHQ QUESTIONS 1-9: 0
2. FEELING DOWN, DEPRESSED OR HOPELESS: 0
SUM OF ALL RESPONSES TO PHQ QUESTIONS 1-9: 0
SUM OF ALL RESPONSES TO PHQ9 QUESTIONS 1 & 2: 0
1. LITTLE INTEREST OR PLEASURE IN DOING THINGS: 0
2. FEELING DOWN, DEPRESSED OR HOPELESS: 0

## 2019-10-18 ENCOUNTER — OFFICE VISIT (OUTPATIENT)
Dept: CARDIOLOGY CLINIC | Age: 69
End: 2019-10-18
Payer: MEDICARE

## 2019-10-18 VITALS
HEART RATE: 56 BPM | BODY MASS INDEX: 35.81 KG/M2 | HEIGHT: 71 IN | WEIGHT: 255.8 LBS | OXYGEN SATURATION: 94 % | DIASTOLIC BLOOD PRESSURE: 84 MMHG | SYSTOLIC BLOOD PRESSURE: 124 MMHG

## 2019-10-18 DIAGNOSIS — E78.5 HYPERLIPIDEMIA, UNSPECIFIED HYPERLIPIDEMIA TYPE: ICD-10-CM

## 2019-10-18 DIAGNOSIS — I25.10 ATHEROSCLEROSIS OF NATIVE CORONARY ARTERY OF NATIVE HEART, ANGINA PRESENCE UNSPECIFIED: ICD-10-CM

## 2019-10-18 DIAGNOSIS — I10 ESSENTIAL HYPERTENSION: ICD-10-CM

## 2019-10-18 DIAGNOSIS — R06.02 SOB (SHORTNESS OF BREATH): Primary | ICD-10-CM

## 2019-10-18 PROCEDURE — 99214 OFFICE O/P EST MOD 30 MIN: CPT | Performed by: INTERNAL MEDICINE

## 2019-10-18 RX ORDER — ATENOLOL 100 MG/1
TABLET ORAL
Qty: 90 TABLET | Refills: 3 | Status: SHIPPED | OUTPATIENT
Start: 2019-10-18 | End: 2020-02-04 | Stop reason: SDUPTHER

## 2019-10-18 RX ORDER — ATORVASTATIN CALCIUM 80 MG/1
40 TABLET, FILM COATED ORAL DAILY
Qty: 90 TABLET | Refills: 2 | Status: SHIPPED | OUTPATIENT
Start: 2019-10-18 | End: 2020-11-30 | Stop reason: SDUPTHER

## 2019-10-21 DIAGNOSIS — K21.9 GASTROESOPHAGEAL REFLUX DISEASE WITHOUT ESOPHAGITIS: ICD-10-CM

## 2019-10-21 RX ORDER — LANSOPRAZOLE 30 MG/1
CAPSULE, DELAYED RELEASE ORAL
Qty: 90 CAPSULE | Refills: 1 | Status: SHIPPED | OUTPATIENT
Start: 2019-10-21 | End: 2020-06-10

## 2019-11-13 ENCOUNTER — TELEPHONE (OUTPATIENT)
Dept: CARDIOLOGY CLINIC | Age: 69
End: 2019-11-13

## 2019-11-13 ENCOUNTER — HOSPITAL ENCOUNTER (OUTPATIENT)
Dept: NON INVASIVE DIAGNOSTICS | Age: 69
Discharge: HOME | End: 2019-11-13
Payer: MEDICARE

## 2019-11-13 DIAGNOSIS — R06.02 SOB (SHORTNESS OF BREATH): ICD-10-CM

## 2019-11-13 DIAGNOSIS — I25.10 ATHEROSCLEROSIS OF NATIVE CORONARY ARTERY OF NATIVE HEART, ANGINA PRESENCE UNSPECIFIED: ICD-10-CM

## 2019-11-13 LAB
LEFT VENTRICULAR EJECTION FRACTION HIGH VALUE: 60 %
LEFT VENTRICULAR EJECTION FRACTION MODE: NORMAL
LV EF: 55 %
LV EF: 58 %
LVEF MODALITY: NORMAL

## 2019-11-13 PROCEDURE — 93306 TTE W/DOPPLER COMPLETE: CPT

## 2019-12-17 DIAGNOSIS — I10 ESSENTIAL HYPERTENSION: ICD-10-CM

## 2019-12-17 RX ORDER — ATENOLOL 100 MG/1
TABLET ORAL
Qty: 90 TABLET | Refills: 3 | OUTPATIENT
Start: 2019-12-17

## 2020-01-15 ENCOUNTER — ANESTHESIA (OUTPATIENT)
Dept: ENDOSCOPY | Age: 70
End: 2020-01-15
Payer: MEDICARE

## 2020-01-15 ENCOUNTER — HOSPITAL ENCOUNTER (OUTPATIENT)
Age: 70
Setting detail: OUTPATIENT SURGERY
Discharge: HOME | End: 2020-01-15
Attending: INTERNAL MEDICINE | Admitting: INTERNAL MEDICINE
Payer: MEDICARE

## 2020-01-15 ENCOUNTER — ANESTHESIA EVENT (OUTPATIENT)
Dept: ENDOSCOPY | Age: 70
End: 2020-01-15
Payer: MEDICARE

## 2020-01-15 VITALS
DIASTOLIC BLOOD PRESSURE: 79 MMHG | BODY MASS INDEX: 35.7 KG/M2 | OXYGEN SATURATION: 99 % | HEART RATE: 51 BPM | RESPIRATION RATE: 16 BRPM | WEIGHT: 255 LBS | TEMPERATURE: 97.5 F | SYSTOLIC BLOOD PRESSURE: 133 MMHG | HEIGHT: 71 IN

## 2020-01-15 VITALS — OXYGEN SATURATION: 91 % | DIASTOLIC BLOOD PRESSURE: 66 MMHG | SYSTOLIC BLOOD PRESSURE: 107 MMHG

## 2020-01-15 PROCEDURE — 2500000003 HC RX 250 WO HCPCS: Performed by: NURSE ANESTHETIST, CERTIFIED REGISTERED

## 2020-01-15 PROCEDURE — 2580000003 HC RX 258: Performed by: ANESTHESIOLOGY

## 2020-01-15 PROCEDURE — 2709999900 HC NON-CHARGEABLE SUPPLY: Performed by: INTERNAL MEDICINE

## 2020-01-15 PROCEDURE — 3700000000 HC ANESTHESIA ATTENDED CARE: Performed by: INTERNAL MEDICINE

## 2020-01-15 PROCEDURE — 3609010600 HC COLONOSCOPY POLYPECTOMY SNARE/COLD BIOPSY: Performed by: INTERNAL MEDICINE

## 2020-01-15 PROCEDURE — 3700000001 HC ADD 15 MINUTES (ANESTHESIA): Performed by: INTERNAL MEDICINE

## 2020-01-15 PROCEDURE — 7100000010 HC PHASE II RECOVERY - FIRST 15 MIN: Performed by: INTERNAL MEDICINE

## 2020-01-15 PROCEDURE — 6360000002 HC RX W HCPCS: Performed by: NURSE ANESTHETIST, CERTIFIED REGISTERED

## 2020-01-15 PROCEDURE — 7100000011 HC PHASE II RECOVERY - ADDTL 15 MIN: Performed by: INTERNAL MEDICINE

## 2020-01-15 PROCEDURE — 88305 TISSUE EXAM BY PATHOLOGIST: CPT

## 2020-01-15 RX ORDER — LIDOCAINE HYDROCHLORIDE 20 MG/ML
INJECTION, SOLUTION INFILTRATION; PERINEURAL PRN
Status: DISCONTINUED | OUTPATIENT
Start: 2020-01-15 | End: 2020-01-15 | Stop reason: SDUPTHER

## 2020-01-15 RX ORDER — SODIUM CHLORIDE 9 MG/ML
INJECTION, SOLUTION INTRAVENOUS CONTINUOUS
Status: DISCONTINUED | OUTPATIENT
Start: 2020-01-15 | End: 2020-01-15 | Stop reason: HOSPADM

## 2020-01-15 RX ORDER — PROPOFOL 10 MG/ML
INJECTION, EMULSION INTRAVENOUS PRN
Status: DISCONTINUED | OUTPATIENT
Start: 2020-01-15 | End: 2020-01-15 | Stop reason: SDUPTHER

## 2020-01-15 RX ADMIN — PROPOFOL 20 MG: 10 INJECTION, EMULSION INTRAVENOUS at 08:38

## 2020-01-15 RX ADMIN — PROPOFOL 50 MG: 10 INJECTION, EMULSION INTRAVENOUS at 08:33

## 2020-01-15 RX ADMIN — PROPOFOL 50 MG: 10 INJECTION, EMULSION INTRAVENOUS at 08:35

## 2020-01-15 RX ADMIN — PROPOFOL 20 MG: 10 INJECTION, EMULSION INTRAVENOUS at 08:37

## 2020-01-15 RX ADMIN — PROPOFOL 20 MG: 10 INJECTION, EMULSION INTRAVENOUS at 08:40

## 2020-01-15 RX ADMIN — PROPOFOL 20 MG: 10 INJECTION, EMULSION INTRAVENOUS at 08:34

## 2020-01-15 RX ADMIN — PROPOFOL 50 MG: 10 INJECTION, EMULSION INTRAVENOUS at 08:31

## 2020-01-15 RX ADMIN — SODIUM CHLORIDE: 9 INJECTION, SOLUTION INTRAVENOUS at 08:07

## 2020-01-15 RX ADMIN — PROPOFOL 20 MG: 10 INJECTION, EMULSION INTRAVENOUS at 08:42

## 2020-01-15 RX ADMIN — LIDOCAINE HYDROCHLORIDE 100 MG: 20 INJECTION, SOLUTION INFILTRATION; PERINEURAL at 08:31

## 2020-01-15 ASSESSMENT — PAIN SCALES - GENERAL
PAINLEVEL_OUTOF10: 0

## 2020-01-15 ASSESSMENT — PAIN - FUNCTIONAL ASSESSMENT: PAIN_FUNCTIONAL_ASSESSMENT: 0-10

## 2020-01-15 NOTE — ANESTHESIA PRE PROCEDURE
allergies J30.2    Hamstring tightness of both lower extremities M62.9    Obesity (BMI 30.0-34. 9) E66.9    Chondromalacia patellae of right knee M22.41    Chronic pain of right knee M25.561, G89.29    H/O spleen injury: Spleenectomy 1957 Z87.828    S/P right knee arthroscopy, chondroplasty, synovectomy and partial medial meniscectomy 11/04/2016 Z98.890       Past Medical History:        Diagnosis Date    ASHD (arteriosclerotic heart disease)     Cancer [facility]     skin    Cold sore 10/2016    Colon polyp     COPD (chronic obstructive pulmonary disease) ([facility] 2006    NO ISSUES IN LAST 10 YEARS- QUIT SMOKING    History of blood transfusion     Hyperlipidemia     Hypertension     Kidney stone     Medial meniscus tear     RIGHT    MI (myocardial infarction) ([facility] 01/2000    Obesity (BMI 30.0-34.9) 9/22/2016    Peyronie's disease 7/28/2011    PONV (postoperative nausea and vomiting)     Psoriasis     PUD (peptic ulcer disease)     Sinus infection 10/2016    Transfusion of, blood     pt has a history of receiving blood or blood product transfusion(s)       Past Surgical History:        Procedure Laterality Date    ANGIOPLASTY  2000    STENT X1    COLONOSCOPY      several    KIDNEY STONE SURGERY  3/03    ureteral stone removed    KNEE ARTHROSCOPY  11/04/2016    right knee heladio sweeney    OTHER SURGICAL HISTORY  11/04/2016    Right knee surgery repair    PTCA  1990    stent    SKIN CANCER EXCISION      on face  on nose and cheek    SPLENECTOMY  1955    UPPER GASTROINTESTINAL ENDOSCOPY         Social History:    Social History     Tobacco Use    Smoking status: Former Smoker     Packs/day: 1.00     Years: 45.00     Pack years: 45.00     Last attempt to quit: 4/15/2009     Years since quitting: 10.7    Smokeless tobacco: Never Used    Tobacco comment: quit April 2009. Previously smoked <1/2PPD   Substance Use Topics    Alcohol use:  Yes     Alcohol/week: 2.0 standard drinks Types: 2 Glasses of wine per week     Comment: minimal                                Counseling given: Not Answered  Comment: quit April 2009. Previously smoked <1/2PPD      Vital Signs (Current):   Vitals:    01/15/20 0744   BP: 130/81   Pulse: 57   Resp: 16   Temp: 97.1 °F (36.2 °C)   TempSrc: Temporal   SpO2: 98%   Weight: 255 lb (115.7 kg)   Height: 5' 11\" (1.803 m)                                              BP Readings from Last 3 Encounters:   01/15/20 130/81   10/18/19 124/84   02/27/19 138/88       NPO Status: Time of last liquid consumption: 0530                        Time of last solid consumption: 0800                        Date of last liquid consumption: 01/15/20(prep)                        Date of last solid food consumption: 01/14/20    BMI:   Wt Readings from Last 3 Encounters:   01/15/20 255 lb (115.7 kg)   10/18/19 255 lb 12.8 oz (116 kg)   02/27/19 256 lb (116.1 kg)     Body mass index is 35.57 kg/m². CBC:   Lab Results   Component Value Date    WBC 11.7 01/11/2019    RBC 5.01 01/11/2019    HGB 15.1 01/11/2019    HCT 45.5 01/11/2019    MCV 90.7 01/11/2019    RDW 14.2 01/11/2019     01/11/2019       CMP:   Lab Results   Component Value Date     01/11/2019    K 5.1 01/11/2019    CL 98 01/11/2019    CO2 26 01/11/2019    BUN 12 01/11/2019    CREATININE 0.9 01/11/2019    GFRAA >60 01/11/2019    GFRAA >60 03/30/2013    AGRATIO 1.6 01/11/2019    LABGLOM >60 01/11/2019    GLUCOSE 97 01/11/2019    PROT 7.2 01/11/2019    PROT 7.6 01/22/2010    CALCIUM 9.6 01/11/2019    BILITOT 0.7 01/11/2019    ALKPHOS 73 01/11/2019    AST 25 01/11/2019    ALT 36 01/11/2019       POC Tests: No results for input(s): POCGLU, POCNA, POCK, POCCL, POCBUN, POCHEMO, POCHCT in the last 72 hours.     Coags: No results found for: PROTIME, INR, APTT    HCG (If Applicable): No results found for: PREGTESTUR, PREGSERUM, HCG, HCGQUANT     ABGs: No results found for: PHART, PO2ART, EJL9YHJ, VPQ7OWQ, BEART, S1YLPVUG

## 2020-01-15 NOTE — PROGRESS NOTES
Name:  Meme Gordon  Age:  78 y.o.  CSN:  [id number]            Past Medical History:        Diagnosis Date    ASHD (arteriosclerotic heart disease)     Cancer ([facility]     skin    Cold sore 10/2016    Colon polyp     COPD (chronic obstructive pulmonary disease) ([facility] 2006    NO ISSUES IN LAST 10 YEARS- QUIT SMOKING    History of blood transfusion     Hyperlipidemia     Hypertension     Kidney stone     Medial meniscus tear     RIGHT    MI (myocardial infarction) ([facility] 01/2000    Obesity (BMI 30.0-34.9) 9/22/2016    Peyronie's disease 7/28/2011    PONV (postoperative nausea and vomiting)     Psoriasis     PUD (peptic ulcer disease)     Sinus infection 10/2016    Transfusion of, blood     pt has a history of receiving blood or blood product transfusion(s)       Past Surgical History:      Procedure Laterality Date    ANGIOPLASTY  2000    STENT X1    COLONOSCOPY      several    KIDNEY STONE SURGERY  3/03    ureteral stone removed    KNEE ARTHROSCOPY  11/04/2016    right knee Uatsdin zahra    OTHER SURGICAL HISTORY  11/04/2016    Right knee surgery repair    PTCA  1990    stent    SKIN CANCER EXCISION      on face  on nose and cheek    SPLENECTOMY  1955    UPPER GASTROINTESTINAL ENDOSCOPY         Medications Prior to Admission:  Medications Prior to Admission: lansoprazole (PREVACID) 30 MG delayed release capsule, TAKE 1 CAPSULE BY MOUTH ONE TIME A DAY  atenolol (TENORMIN) 100 MG tablet, TAKE .5 TABLET BY MOUTH EVERY DAY (Patient taking differently: No sig reported)  atorvastatin (LIPITOR) 80 MG tablet, Take 0.5 tablets by mouth daily  amLODIPine (NORVASC) 5 MG tablet, Take 1 tablet by mouth daily  lisinopril (PRINIVIL;ZESTRIL) 40 MG tablet, Take 1 tablet by mouth daily  therapeutic multivitamin-minerals (THERAGRAN-M) tablet, Take 1 tablet by mouth daily. aspirin 81 MG EC tablet, Take 81 mg by mouth daily.   cetirizine (ZYRTEC) 10 MG tablet, Take 10 mg by mouth every evening Allergies:  Adhesive tape    Social History:  Social History     Socioeconomic History    Marital status:      Spouse name: Chad Castillo Number of children: 3    Years of education: Not on file    Highest education level: Not on file   Occupational History    Not on file   Social Needs    Financial resource strain: Not on file    Food insecurity:     Worry: Not on file     Inability: Not on file    Transportation needs:     Medical: Not on file     Non-medical: Not on file   Tobacco Use    Smoking status: Former Smoker     Packs/day: 1.00     Years: 45.00     Pack years: 45.00     Last attempt to quit: 4/15/2009     Years since quitting: 10.7    Smokeless tobacco: Never Used    Tobacco comment: quit April 2009. Previously smoked <1/2PPD   Substance and Sexual Activity    Alcohol use:  Yes     Alcohol/week: 2.0 standard drinks     Types: 2 Glasses of wine per week     Comment: minimal    Drug use: No    Sexual activity: Not on file     Comment:    Lifestyle    Physical activity:     Days per week: Not on file     Minutes per session: Not on file    Stress: Not on file   Relationships    Social connections:     Talks on phone: Not on file     Gets together: Not on file     Attends [employer] service: Not on file     Active member of club or organization: Not on file     Attends meetings of clubs or organizations: Not on file     Relationship status: Not on file    Intimate partner violence:     Fear of current or ex partner: Not on file     Emotionally abused: Not on file     Physically abused: Not on file     Forced sexual activity: Not on file   Other Topics Concern    Not on file   Social History Narrative    Not on file       Family History:      Problem Relation Age of Onset    Cancer Father         bladder    Hypertension Father     Heart Disease Father     High Blood Pressure Father     High Cholesterol Father     Heart Disease Mother     High Blood Pressure Mother    

## 2020-01-15 NOTE — ANESTHESIA POSTPROCEDURE EVALUATION
Department of Anesthesiology  Postprocedure Note    Patient: Clarible Berger  MRN: [id number]  YOB: 1950  Date of evaluation: 1/15/2020  Time:  9:25 AM     Procedure Summary     Date:  01/15/20 Room / Location:  [address]    Anesthesia Start:  Kailash Gema Anesthesia Stop:  1792    Procedure:  COLONOSCOPY POLYPECTOMY SNARE/COLD BIOPSY Diagnosis:  (HISTORY OF COLON POLYPS Z86.010)    Surgeon:  Florencio St MD Responsible Provider:  Sunni Guthrie MD    Anesthesia Type:  MAC ASA Status:  3          Anesthesia Type: MAC    Pee Phase I: Pee Score: 10    Pee Phase II: Pee Score: 10    Last vitals: Reviewed and per EMR flowsheets.        Anesthesia Post Evaluation    Patient location during evaluation: PACU  Patient participation: complete - patient participated  Level of consciousness: awake  Airway patency: patent  Nausea & Vomiting: no vomiting and no nausea  Complications: no  Cardiovascular status: hemodynamically stable  Respiratory status: acceptable  Hydration status: stable

## 2020-01-16 ENCOUNTER — OFFICE VISIT (OUTPATIENT)
Dept: FAMILY MEDICINE CLINIC | Age: 70
End: 2020-01-16
Payer: MEDICARE

## 2020-01-16 VITALS
BODY MASS INDEX: 36.96 KG/M2 | SYSTOLIC BLOOD PRESSURE: 130 MMHG | HEIGHT: 71 IN | OXYGEN SATURATION: 98 % | HEART RATE: 58 BPM | DIASTOLIC BLOOD PRESSURE: 86 MMHG | WEIGHT: 264 LBS

## 2020-01-16 DIAGNOSIS — I25.10 ATHEROSCLEROSIS OF NATIVE CORONARY ARTERY OF NATIVE HEART, ANGINA PRESENCE UNSPECIFIED: ICD-10-CM

## 2020-01-16 DIAGNOSIS — I10 ESSENTIAL HYPERTENSION, BENIGN: ICD-10-CM

## 2020-01-16 LAB
A/G RATIO: 1.4 (ref 1.1–2.2)
ACANTHOCYTES: ABNORMAL
ALBUMIN SERPL-MCNC: 4 G/DL (ref 3.4–5)
ALP BLD-CCNC: 90 U/L (ref 40–129)
ALT SERPL-CCNC: 43 U/L (ref 10–40)
ANION GAP SERPL CALCULATED.3IONS-SCNC: 13 MMOL/L (ref 3–16)
ANISOCYTOSIS: ABNORMAL
AST SERPL-CCNC: 36 U/L (ref 15–37)
BANDED NEUTROPHILS RELATIVE PERCENT: 1 % (ref 0–7)
BASOPHILS ABSOLUTE: 0.1 K/UL (ref 0–0.2)
BASOPHILS RELATIVE PERCENT: 1 %
BILIRUB SERPL-MCNC: 0.7 MG/DL (ref 0–1)
BUN BLDV-MCNC: 11 MG/DL (ref 7–20)
BURR CELLS: ABNORMAL
CALCIUM SERPL-MCNC: 9.5 MG/DL (ref 8.3–10.6)
CHLORIDE BLD-SCNC: 96 MMOL/L (ref 99–110)
CHOLESTEROL, TOTAL: 120 MG/DL (ref 0–199)
CO2: 23 MMOL/L (ref 21–32)
CREAT SERPL-MCNC: 0.9 MG/DL (ref 0.8–1.3)
EOSINOPHILS ABSOLUTE: 0 K/UL (ref 0–0.6)
EOSINOPHILS RELATIVE PERCENT: 0 %
GFR AFRICAN AMERICAN: >60
GFR NON-AFRICAN AMERICAN: >60
GLOBULIN: 2.8 G/DL
GLUCOSE BLD-MCNC: 107 MG/DL (ref 70–99)
HCT VFR BLD CALC: 44.2 % (ref 40.5–52.5)
HDLC SERPL-MCNC: 46 MG/DL (ref 40–60)
HEMATOLOGY PATH CONSULT: YES
HEMOGLOBIN: 14.8 G/DL (ref 13.5–17.5)
LDL CHOLESTEROL CALCULATED: 41 MG/DL
LYMPHOCYTES ABSOLUTE: 5.9 K/UL (ref 1–5.1)
LYMPHOCYTES RELATIVE PERCENT: 48 %
MACROCYTES: ABNORMAL
MCH RBC QN AUTO: 30.3 PG (ref 26–34)
MCHC RBC AUTO-ENTMCNC: 33.5 G/DL (ref 31–36)
MCV RBC AUTO: 90.7 FL (ref 80–100)
METAMYELOCYTES RELATIVE PERCENT: 1 %
MONOCYTES ABSOLUTE: 1.7 K/UL (ref 0–1.3)
MONOCYTES RELATIVE PERCENT: 14 %
NEUTROPHILS ABSOLUTE: 4.6 K/UL (ref 1.7–7.7)
NEUTROPHILS RELATIVE PERCENT: 35 %
PDW BLD-RTO: 14.1 % (ref 12.4–15.4)
PLATELET # BLD: 278 K/UL (ref 135–450)
PMV BLD AUTO: 9.8 FL (ref 5–10.5)
POIKILOCYTES: ABNORMAL
POTASSIUM SERPL-SCNC: 4.6 MMOL/L (ref 3.5–5.1)
RBC # BLD: 4.87 M/UL (ref 4.2–5.9)
SCHISTOCYTES: ABNORMAL
SMUDGE CELLS: PRESENT
SODIUM BLD-SCNC: 132 MMOL/L (ref 136–145)
TOTAL PROTEIN: 6.8 G/DL (ref 6.4–8.2)
TRIGL SERPL-MCNC: 167 MG/DL (ref 0–150)
VLDLC SERPL CALC-MCNC: 33 MG/DL
WBC # BLD: 12.3 K/UL (ref 4–11)

## 2020-01-16 PROCEDURE — 99214 OFFICE O/P EST MOD 30 MIN: CPT | Performed by: FAMILY MEDICINE

## 2020-01-16 PROCEDURE — G8510 SCR DEP NEG, NO PLAN REQD: HCPCS | Performed by: FAMILY MEDICINE

## 2020-01-16 PROCEDURE — 3288F FALL RISK ASSESSMENT DOCD: CPT | Performed by: FAMILY MEDICINE

## 2020-01-16 ASSESSMENT — PATIENT HEALTH QUESTIONNAIRE - PHQ9
SUM OF ALL RESPONSES TO PHQ QUESTIONS 1-9: 0
SUM OF ALL RESPONSES TO PHQ QUESTIONS 1-9: 0
SUM OF ALL RESPONSES TO PHQ9 QUESTIONS 1 & 2: 0
2. FEELING DOWN, DEPRESSED OR HOPELESS: 0
1. LITTLE INTEREST OR PLEASURE IN DOING THINGS: 0

## 2020-01-16 NOTE — PROGRESS NOTES
Elissa Fernandes is a 78 y.o. male. HPI:  Here for general checkup and for left elbow issue  Did some heavy yard work 3 to 4 days ago then the next day woke up with a swollen left elbow that was painless and the next day the whole inner aspect of his left arm was discolored again painless  No recollection of any specific trauma  Denies swelling numbness or tingling in his hand  Wt Readings from Last 3 Encounters:   01/16/20 264 lb (119.7 kg)   01/15/20 255 lb (115.7 kg)   10/18/19 255 lb 12.8 oz (116 kg)     Meds, vitamins and allergies reviewed with Patient    ROS:  Gen: No fever  HEENT: No cold symptoms, no sore throat. CV:  Denies chest pain or palpitations. Pulm:  Denies shortness of breath, cough. Does complain of some mild chest congestion that clears with coughing no sputum production shortness of breath or fever  Abd:  Denies abdominal pain, nausea and vomiting. Skin: no rash    Allergies   Allergen Reactions    Adhesive Tape Other (See Comments)     \"skin turns red\"       Prior to Visit Medications    Medication Sig Taking? Authorizing Provider   lansoprazole (PREVACID) 30 MG delayed release capsule TAKE 1 CAPSULE BY MOUTH ONE TIME A DAY Yes Patricia Patel MD   atenolol (TENORMIN) 100 MG tablet TAKE .5 TABLET BY MOUTH EVERY DAY  Patient taking differently: No sig reported Yes Barb Broussard MD   atorvastatin (LIPITOR) 80 MG tablet Take 0.5 tablets by mouth daily Yes Barb Broussard MD   amLODIPine (NORVASC) 5 MG tablet Take 1 tablet by mouth daily Yes Barb Broussard MD   lisinopril (PRINIVIL;ZESTRIL) 40 MG tablet Take 1 tablet by mouth daily Yes Barb Broussard MD   cetirizine (ZYRTEC) 10 MG tablet Take 10 mg by mouth every evening  Yes Historical Provider, MD   therapeutic multivitamin-minerals (THERAGRAN-M) tablet Take 1 tablet by mouth daily. Yes Historical Provider, MD   aspirin 81 MG EC tablet Take 81 mg by mouth daily.  Yes Historical Provider, MD       OBJECTIVE:  /86 Pulse 58   Ht 5' 10.98\" (1.803 m)   Wt 264 lb (119.7 kg)   SpO2 98%   BMI 36.84 kg/m²   GEN:  in NAD obese weight is up 10 pounds  NECK:  Supple without adenopathy. No bruits  CV:  Regular rate and rhythm, S1 and S2 normal, no murmurs, clicks  PULM:  Chest is clear, no wheezing ,  symmetric air entry throughout both lung fields. EXT: No rash or edema  Left elbow olecranon bursa slightly swollen probably less than 5 cc and marked ecchymosis in the inner upper arm and inner forearm above and below the elbow on the left  No tenderness  Mid upper arm diameter 13/2 inches equal right and left  Left radial pulse 2+  ASSESSMENT/PLAN:  1. Obesity (BMI 30.0-34. 9)  Exercise prescription for 150 minutes of brisk walking per week minimum discussed  Calorie restriction    2. Essential hypertension, benign  Stable continue present medication  Labs today    3.  Atherosclerosis of native coronary artery of native heart, angina presence unspecified  Able, continue aggressive risk factor management  Lipid panel today    4 left olecranona bursitis  With bruising, suspect silent trauma  NSAIDs as needed  Observation    5 immunizations/health maintenance   Shingrix when avail, information sheet given  Chest CT per lung cancer screening protocol    Spent 25 minutes with patient, greater than 50% of time emphasizing healthy lifestyle, reassuring him about his left elbow issue, and coordinating his care

## 2020-01-17 LAB — HEMATOLOGY PATH CONSULT: NORMAL

## 2020-01-27 ENCOUNTER — HOSPITAL ENCOUNTER (OUTPATIENT)
Dept: CT IMAGING | Age: 70
Discharge: HOME | End: 2020-01-27
Payer: MEDICARE

## 2020-01-27 DIAGNOSIS — Z87.891 FORMER SMOKER: ICD-10-CM

## 2020-01-27 PROCEDURE — G0297 LDCT FOR LUNG CA SCREEN: HCPCS

## 2020-01-30 ENCOUNTER — TELEPHONE (OUTPATIENT)
Dept: CASE MANAGEMENT | Age: 70
End: 2020-01-30

## 2020-01-30 NOTE — TELEPHONE ENCOUNTER
Chart reviewed including smoking history and recent lung scan results. Will follow in the Lung Nodule Program. Next lung screen in 12 months.  Results sent

## 2020-02-04 ENCOUNTER — TELEPHONE (OUTPATIENT)
Dept: CARDIOLOGY CLINIC | Age: 70
End: 2020-02-04

## 2020-02-04 RX ORDER — LISINOPRIL 40 MG/1
TABLET ORAL
Qty: 90 TABLET | Refills: 3 | Status: SHIPPED | OUTPATIENT
Start: 2020-02-04 | End: 2020-11-30 | Stop reason: SDUPTHER

## 2020-02-04 RX ORDER — ATENOLOL 100 MG/1
100 TABLET ORAL DAILY
Qty: 90 TABLET | Refills: 3 | Status: SHIPPED | OUTPATIENT
Start: 2020-02-04 | End: 2021-03-11 | Stop reason: SDUPTHER

## 2020-02-04 NOTE — TELEPHONE ENCOUNTER
At his last visit he doesn't remember [facility] telling him that his atenolol was going to be decreased. He always got atenolol 100mg  One tab daily and a 90 day supply. Was his atenolol decreased or was that a mistake ? Please call patient to let him know .  He ran out of med so he needs rx [facility] [address]

## 2020-02-05 ENCOUNTER — TELEPHONE (OUTPATIENT)
Dept: FAMILY MEDICINE CLINIC | Age: 70
End: 2020-02-05

## 2020-02-17 ENCOUNTER — TELEPHONE (OUTPATIENT)
Dept: PHARMACY | Facility: CLINIC | Age: 70
End: 2020-02-17

## 2020-04-07 RX ORDER — AMLODIPINE BESYLATE 5 MG/1
TABLET ORAL
Qty: 90 TABLET | Refills: 3 | Status: SHIPPED | OUTPATIENT
Start: 2020-04-07 | End: 2021-03-11

## 2020-06-10 RX ORDER — LANSOPRAZOLE 30 MG/1
CAPSULE, DELAYED RELEASE ORAL
Qty: 90 CAPSULE | Refills: 1 | Status: SHIPPED | OUTPATIENT
Start: 2020-06-10 | End: 2020-12-08

## 2020-06-10 NOTE — TELEPHONE ENCOUNTER
Medication:   Requested Prescriptions     Pending Prescriptions Disp Refills    lansoprazole (PREVACID) 30 MG delayed release capsule [Pharmacy Med Name: Lansoprazole Oral Capsule Delayed Release 30 MG] 90 capsule 0     Sig: TAKE 1 CAPSULE BY MOUTH ONE TIME A DAY        Last Filled:  10/21/19 #90, 1 RF     Patient Phone Number: [phone] (home)     Last appt: 1/16/2020 breast mass   Next appt: Visit date not found    Last OARRS:   RX Monitoring 10/12/2016   Attestation The Prescription Monitoring Report for this patient was reviewed today.

## 2020-06-17 ENCOUNTER — TELEPHONE (OUTPATIENT)
Dept: PHARMACY | Facility: CLINIC | Age: 70
End: 2020-06-17

## 2020-06-17 NOTE — TELEPHONE ENCOUNTER
CLINICAL PHARMACY: ADHERENCE REVIEW  Identified care gap per [employer]; fills at [facility] and [facility]: ACE/ARB and Statin adherence    Last Office Visit: 1/16/2020    ASSESSMENT  ACE/ARB ADHERENCE  (2019 Shay Engleandra = 100%; YTD PDC = 100%)     Per Insurance Records   Lisinopril last filled on 4/30/2020 for a 90 day supply. There should be 2 refills on file at [facility] pharmacy. BP Readings from Last 3 Encounters:   01/16/20 130/86   01/15/20 133/79   01/15/20 107/66     CrCl cannot be calculated (Patient's most recent lab result is older than the maximum 120 days allowed. ). STATIN ADHERENCE  (2019 [facility] Avis = 75%; YTD PDC = filled only once) Potential Fail Date: 8/8/2020    Per Insurance Records   Atorvastatin last filled on 3/13/2020 for a 90 day supply. Per [address]   Atorvastatin last picked up 1 week ago. SIG: Take ONE-HALF tablet daily. 3 refills remaining. Billed through [employer]. Lab Results   Component Value Date    CHOL 120 01/16/2020    TRIG 167 (H) 01/16/2020    HDL 46 01/16/2020    LDLCALC 41 01/16/2020     ALT   Date Value Ref Range Status   01/16/2020 43 (H) 10 - 40 U/L Final     AST   Date Value Ref Range Status   01/16/2020 36 15 - 37 U/L Final     The ASCVD Risk score (Pearl Standard., et al., 2013) failed to calculate for the following reasons: The valid total cholesterol range is 130 to 320 mg/dL     PLAN  No patient out reach planned at this time. Refill timely, has refills on file and clinical pharmacist counseled pt on 2/17/2020. Will sign off at this time. Lorena Reyes, PharmD, [facility]  Direct: [phone]  Department, toll free [phone], option 7            For Pharmacy Admin Tracking Only    PHSO: Yes  Total # of Interventions Recommended: 2  - New Order #: 0 New Medication Order Reason(s): Adherence  - Refills Provided #: 0  - Updated Order #: 0 Updated Order Reason(s):  Other  - Maintenance Safety Lab Monitoring #: 1  Recommended intervention

## 2020-07-10 NOTE — PATIENT INSTRUCTIONS

## 2020-09-16 ENCOUNTER — TELEPHONE (OUTPATIENT)
Dept: PHARMACY | Facility: CLINIC | Age: 70
End: 2020-09-16

## 2020-09-16 NOTE — TELEPHONE ENCOUNTER
CLINICAL PHARMACY: ADHERENCE REVIEW  Identified care gap per [employer]: ACE/ARB and Statin adherence    Last Office Visit: 10/18/2019 Vincent Daniels, cardiologist)    ASSESSMENT  ACE/ARB ADHERENCE  N/A    Per Outcomes:  Lisinopril 40 mg tablets last filled on 7/30/2020 for a 90 day supply. Most recent RX sent 2/4/2020 for a 90D supply with 3 refills, so patient should have enough refills through end of year. BP Readings from Last 3 Encounters:   01/16/20 130/86   01/15/20 133/79   01/15/20 107/66     CrCl cannot be calculated (Patient's most recent lab result is older than the maximum 120 days allowed. ). STATIN ADHERENCE  Patient identified as being due for an adherence monitoring checkpoint through Outcomes. Per Outcomes: Atorvastatin 80 mg tablets last filled on 6/10/2020 for a 90 day supply. SIG; take 1/2 tablet PO daily. Most recent RX sent 10/18/2019 for 180 day supply with 2 refills. Per [address]:   Atorvastatin 80 mg tablets last picked up 9/7/2020 for a 90 day supply. 1 refill remaining. Lab Results   Component Value Date    CHOL 120 01/16/2020    TRIG 167 (H) 01/16/2020    HDL 46 01/16/2020    LDLCALC 41 01/16/2020     ALT   Date Value Ref Range Status   01/16/2020 43 (H) 10 - 40 U/L Final     AST   Date Value Ref Range Status   01/16/2020 36 15 - 37 U/L Final     The ASCVD Risk score (Mechelle Pickard., et al., 2013) failed to calculate for the following reasons: The valid total cholesterol range is 130 to 320 mg/dL     PLAN  Attempting to reach patient to review.  Left message asking for return call. [address] and they confirmed recent pickup of atorvastatin. To address with patient:  -How are you taking this medication (prescribed 1/2 tablet daily)?    -Any issues with the medication?  -Pill box    Gianni Harvey, 0553 PharmD Candidate

## 2020-09-16 NOTE — TELEPHONE ENCOUNTER
Patient returned call. He is taking atorvastatin as prescribed (1/2 tablet PO daily) and denies side effects. Uses pill box.     Eric Dunlap, 5998 PharmD Candidate

## 2020-09-16 NOTE — TELEPHONE ENCOUNTER
Reviewed and agree with PharmD candidate. Khushboo Ram, TosinD, RP, BCPS  [facility] Select Clinical Pharmacist    CLINICAL PHARMACY CONSULT: MED RECONCILIATION/REVIEW ADDENDUM  For Pharmacy Admin Tracking Only  PHSO: Yes  Total # of Interventions Recommended: 2  - New Order #: 0 New Medication Order Reason(s):  Adherence  - Refills Provided #: 0  - Maintenance Safety Lab Monitoring #: 1  - New Therapy Lab Monitoring #: 1  Recommended intervention potential cost savings: 1  Total Interventions Accepted: 0  Time Spent (min): 15

## 2020-10-26 ENCOUNTER — OFFICE VISIT (OUTPATIENT)
Dept: CARDIOLOGY CLINIC | Age: 70
End: 2020-10-26
Payer: MEDICARE

## 2020-10-26 VITALS
BODY MASS INDEX: 37.24 KG/M2 | DIASTOLIC BLOOD PRESSURE: 62 MMHG | HEIGHT: 71 IN | HEART RATE: 58 BPM | WEIGHT: 266 LBS | SYSTOLIC BLOOD PRESSURE: 130 MMHG | OXYGEN SATURATION: 96 %

## 2020-10-26 PROCEDURE — 99214 OFFICE O/P EST MOD 30 MIN: CPT | Performed by: INTERNAL MEDICINE

## 2020-10-26 NOTE — PATIENT INSTRUCTIONS
Stress echo  Coenzyme q 10 400 mg daily  Sleep study and referral to Dr Naya turner, monitor blood pressure at home and call if consistently over 140  Follow up in 6  Months with Dr Melisa Sosa

## 2020-10-26 NOTE — PROGRESS NOTES
[address]   Cardiac Followup    Referring Provider:  Angus Porter MD     Chief Complaint   Patient presents with    Fatigue     always     Shortness of Breath     always     Edema     feet    Follow-up     1 yr        History of Present Illness:   Mr. Calderon Hernandez is here today for regular f/u of his CAD, HTN, hyperlipidemia. He is now fully retired from bus driving. Today he complains of exertional sob, swelling in bilateral legs (right greater than left) and, fatigue. He falls to sleep easily just sitting in a chair. He also has frequent feelings of \"fuzziness\" especially if he is golfing and looking down at his golf ball. He has no chest pain, change in exertional sob palpitations or dizziness. is feeling well. He has no chest pain, change in exertional sob,suncope. He has no change in exercise tolerance    Patient is compliant  with medication and is tolerating them well without side effects      Past Medical History:   has a past medical history of ASHD (arteriosclerotic heart disease), Cancer ([facility] [address] Cold sore, Colon polyp, COPD (chronic obstructive pulmonary disease) ([facility] [address] History of blood transfusion, Hyperlipidemia, Hypertension, Kidney stone, Medial meniscus tear, MI (myocardial infarction) ([facility] [address] Obesity (BMI 30.0-34.9), Peyronie's disease, PONV (postoperative nausea and vomiting), Psoriasis, PUD (peptic ulcer disease), Sinus infection, and Transfusion of, blood. Surgical History:   has a past surgical history that includes Splenectomy (1955); Percutaneous Transluminal Coronary Angio (1990); Kidney stone surgery (3/03); Skin cancer excision (); angioplasty (2000); Colonoscopy; Upper gastrointestinal endoscopy; other surgical history (11/04/2016); Knee arthroscopy (11/04/2016); and Colonoscopy (1/15/2020). Social History:   reports that he quit smoking about 11 years ago. He has a 45.00 pack-year smoking history.  He has never used smokeless tobacco. He reports current alcohol use of about 2.0 standard drinks of alcohol per week. He reports that he does not use drugs. Family History:  family history includes Cancer in his father; Heart Attack in his mother; Heart Disease in his father and mother; High Blood Pressure in his father and mother; High Cholesterol in his father and mother; Hypertension in his father. Home Medications:  Current Outpatient Medications   Medication Sig Dispense Refill    lansoprazole (PREVACID) 30 MG delayed release capsule TAKE 1 CAPSULE BY MOUTH ONE TIME A DAY  90 capsule 1    amLODIPine (NORVASC) 5 MG tablet TAKE 1 TABLET BY MOUTH EVERY DAY 90 tablet 3    lisinopril (PRINIVIL;ZESTRIL) 40 MG tablet TAKE 1 TABLET BY MOUTH EVERY DAY 90 tablet 3    atenolol (TENORMIN) 100 MG tablet Take 1 tablet by mouth daily 90 tablet 3    atorvastatin (LIPITOR) 80 MG tablet Take 0.5 tablets by mouth daily 90 tablet 2    cetirizine (ZYRTEC) 10 MG tablet Take 10 mg by mouth every evening       therapeutic multivitamin-minerals (THERAGRAN-M) tablet Take 1 tablet by mouth daily.  aspirin 81 MG EC tablet Take 81 mg by mouth daily. No current facility-administered medications for this visit. Allergies:  Adhesive tape     Review of Systems:   · Constitutional: there has been no unanticipated weight loss. There's been no change in energy level, sleep pattern, or activity level. · Eyes: No visual changes or diplopia. No scleral icterus. · ENT: No Headaches, hearing loss or vertigo. No mouth sores or sore throat. · Cardiovascular: Reviewed in HPI  · Respiratory: No cough or wheezing, no sputum production. No hematemesis. · Gastrointestinal: No abdominal pain, appetite loss, blood in stools. No change in bowel or bladder habits. · Genitourinary: No dysuria, trouble voiding, or hematuria. · Musculoskeletal:  No gait disturbance, weakness or joint complaints. · Integumentary: No rash or pruritis.   · Neurological: No headache, diplopia, change in muscle strength, numbness or tingling. No change in gait, balance, coordination, mood, affect, memory, mentation, behavior  · Psychiatric: No anxiety, no depression. · Endocrine: No malaise, fatigue or temperature intolerance. No excessive thirst, fluid intake, or urination. No tremor. · Hematologic/Lymphatic: No abnormal bruising or bleeding, blood clots or swollen lymph nodes. · Allergic/Immunologic: No nasal congestion or hives. Physical Examination:    Vitals:    10/26/20 1301   BP: 130/62   Pulse: 58   SpO2: 96%     Constitutional and General Appearance:   . NAD   SKIN:  .     Warm and dry  EYES:    .     EOMI  Neck:   . Normal carotid contour  Respiratory:  · Normal excursion and expansion without use of accessory muscles  · Resp Auscultation: Normal breath sounds without dullness  Cardiovascular:  · The apical impulses not displaced  · Heart tones are crisp and normal  · Cervical veins are not engorged  · Normal L8O6, No S3, systolic Murmur  · Peripheral pulses are symmetrical and full  Extremities:  · There is no clubbing, cyanosis of the extremities. · No edema  · Femoral Arteries: 2+ and equal  · Pedal Pulses: 2+ and equal   Abdomen:  · No masses or tenderness  · Liver/Spleen: No Abnormalities Noted  Neurological/Psychiatric:  · Alert and oriented in all spheres  · No abnormalities of mood, affect, memory  ·   All testing and labs listed below were personally reviewed. 6/2017  abd doppler-- proximal aorta 2.5x2.6  11/13/2019  Echo---Normal left ventricle size,moderate wall thickness and normal systolic   function with an estimated ejection fraction of 55-60%. -No regional wall motion abnormalities are seen.   -Normal diastolic function. E/e\"=7.45.   -Mild mitral regurgitation.   -Aortic valve appears sclerotic but opens adequately.    -Mild tricuspid regurgitation.   -Estimated pulmonary artery systolic pressure is normal at 28-33 mmHg   assuming a right atrial

## 2020-11-09 ENCOUNTER — HOSPITAL ENCOUNTER (OUTPATIENT)
Dept: NON INVASIVE DIAGNOSTICS | Age: 70
Discharge: HOME | End: 2020-11-09
Payer: MEDICARE

## 2020-11-09 LAB
LV EF: 50 %
LVEF MODALITY: NORMAL

## 2020-11-09 PROCEDURE — 93351 STRESS TTE COMPLETE: CPT

## 2020-11-09 PROCEDURE — 93320 DOPPLER ECHO COMPLETE: CPT

## 2020-11-24 NOTE — TELEPHONE ENCOUNTER
CLINICAL PHARMACY: ADHERENCE REVIEW  Identified care gap per [employer]; fills at [facility]: ACE/ARB and Statin adherence    Last Office Visit: 10/26/2020 with cardiology     ASSESSMENT  ACE/ARB ADHERENCE  PDC: 100%    Per Outcomes   Lisinopril 40 mg daily last filled on 11/3/2020 for a 90 day supply. 0 refills remaining. Billed through [employer]. BP Readings from Last 3 Encounters:   10/26/20 130/62   20 130/86   01/15/20 133/79     CrCl cannot be calculated (Patient's most recent lab result is older than the maximum 120 days allowed. ). STATIN ADHERENCE  PDC: 100%    Per Insurance Records   Atorvastatin 80 mg dailly last filled on 2020 for a 90 day supply. 0 refills remaining. Billed through [employer]. - refill due 2020; prescription . Lab Results   Component Value Date    CHOL 120 2020    TRIG 167 (H) 2020    HDL 46 2020    LDLCALC 41 2020     ALT   Date Value Ref Range Status   2020 43 (H) 10 - 40 U/L Final     AST   Date Value Ref Range Status   2020 36 15 - 37 U/L Final     The ASCVD Risk score (Radha Franks., et al., 2013) failed to calculate for the following reasons: The valid total cholesterol range is 130 to 320 mg/dL     PLAN  Unable to reach patient at home or mobile number; LM for call back. Second attempt to follow.   To be discussed:  - lisinopril adherence - offer assistance in obtaining refills for next fill  - atorvastatin adherence - offer assistance in obtaining refills for next fill    Geo Vieyra, [address] [place] [address]  Ph: [phone], option 7

## 2020-11-30 RX ORDER — ATORVASTATIN CALCIUM 80 MG/1
40 TABLET, FILM COATED ORAL DAILY
Qty: 45 TABLET | Refills: 3 | Status: SHIPPED | OUTPATIENT
Start: 2020-11-30 | End: 2021-10-18 | Stop reason: SDUPTHER

## 2020-11-30 RX ORDER — LISINOPRIL 40 MG/1
40 TABLET ORAL DAILY
Qty: 90 TABLET | Refills: 3 | Status: SHIPPED | OUTPATIENT
Start: 2020-11-30 | End: 2020-12-16

## 2020-11-30 NOTE — TELEPHONE ENCOUNTER
Unable to reach patient on second attempt.      Jessica Foster, PharmD Candidate  [address]  Ph: [phone], option 7

## 2020-11-30 NOTE — TELEPHONE ENCOUNTER
Morgan Awad MD, patient interested in 90-day supply. LOV: 10/26/20  Next: 4/26/21    Thank you,  Tosin MarquesD, [facility]  Direct: [phone]  Department, toll free: [phone], option 7     =======================================================   Reviewed and agree with PharmD candidate below.   · Appears did see cardiology in Oct; will pend refill requests, since will need both again prior to next appt

## 2020-12-02 NOTE — TELEPHONE ENCOUNTER
Noted lisinopril and atorvastatin rxs reordered, thank you. Attempted to reach patient again to advise; again no answer. Did not leave additional messages (as below, appears overall adherent per [facility])    =======================================================   For Pharmacy Admin Tracking Only    PHSO: Yes  Total # of Interventions Recommended: 2  - New Order #: 0 New Medication Order Reason(s):  Adherence  - Refills Provided #: 2  - Maintenance Safety Lab Monitoring #: 1  - New Therapy Lab Monitoring #: 0  Recommended intervention potential cost savings: 1  Total Interventions Accepted: 2  Time Spent (min): 10

## 2020-12-08 RX ORDER — LANSOPRAZOLE 30 MG/1
CAPSULE, DELAYED RELEASE ORAL
Qty: 90 CAPSULE | Refills: 0 | Status: SHIPPED | OUTPATIENT
Start: 2020-12-08 | End: 2020-12-14

## 2020-12-08 NOTE — TELEPHONE ENCOUNTER
Medication:   Requested Prescriptions     Pending Prescriptions Disp Refills    lansoprazole (PREVACID) 30 MG delayed release capsule [Pharmacy Med Name: Lansoprazole Oral Capsule Delayed Release 30 MG] 90 capsule 0     Sig: TAKE 1 CAPSULE BY MOUTH ONE TIME A DAY        Last Filled:  6/10/20 #90, 1 RF     Patient Phone Number: [phone] (home)     Last appt: 1/16/2020 breast mass   Next appt: Visit date not found    Last OARRS:   RX Monitoring 10/12/2016   Attestation The Prescription Monitoring Report for this patient was reviewed today.

## 2020-12-14 RX ORDER — LANSOPRAZOLE 30 MG/1
CAPSULE, DELAYED RELEASE ORAL
Qty: 90 CAPSULE | Refills: 0 | Status: SHIPPED | OUTPATIENT
Start: 2020-12-14 | End: 2021-03-11 | Stop reason: SDUPTHER

## 2020-12-14 NOTE — TELEPHONE ENCOUNTER
Medication:   Requested Prescriptions     Pending Prescriptions Disp Refills    lansoprazole (PREVACID) 30 MG delayed release capsule [Pharmacy Med Name: Lansoprazole Oral Capsule Delayed Release 30 MG] 90 capsule 0     Sig: TAKE 1 CAPSULE BY MOUTH ONE TIME A DAY        Last Filled:  12/8/20 #90, 0 RF     Patient Phone Number: [phone] (home)     Last appt: 1/16/2020 obesity, breast mass  Next appt: Visit date not found - patient scheduled AWV 1/18/21 with Dr. Adriane Ledesma     Last OARRS:   RX Monitoring 10/12/2016   Attestation The Prescription Monitoring Report for this patient was reviewed today.

## 2020-12-16 ENCOUNTER — TELEPHONE (OUTPATIENT)
Dept: CARDIOLOGY CLINIC | Age: 70
End: 2020-12-16

## 2020-12-16 RX ORDER — HYDROCHLOROTHIAZIDE 25 MG/1
12.5 TABLET ORAL EVERY MORNING
Qty: 90 TABLET | Refills: 1 | Status: SHIPPED | OUTPATIENT
Start: 2020-12-16 | End: 2021-10-18 | Stop reason: SDUPTHER

## 2020-12-16 RX ORDER — LISINOPRIL 40 MG/1
TABLET ORAL
Qty: 90 TABLET | Refills: 3 | Status: SHIPPED | OUTPATIENT
Start: 2020-12-16 | End: 2021-10-18 | Stop reason: SDUPTHER

## 2020-12-16 NOTE — TELEPHONE ENCOUNTER
10/26/20 DGB  Return in about 6 months (around 4/26/2021).   Stress echo  Coenzyme q 10 400 mg daily  Sleep study and referral to Dr Izzy Shepherd norvasc, monitor blood pressure at home and call if consistently over 140  Follow up in 6  Months with Dr Rhona Benítez

## 2020-12-16 NOTE — TELEPHONE ENCOUNTER
Reviewed with Dr Stephany Almanzar, patient should take hctz 12.5 mg daily and repeat renal panel in 3 months.   Notified patient and orders placed

## 2020-12-16 NOTE — TELEPHONE ENCOUNTER
Received refill request for Lisinopril 40 mg from [facility].     Last OV: 10/26/20 DGB    Last Labs: 1/16/20    Last Refill: 11/30/20 #90 with 3 refills    Next Appt: 4/26/21 DANNY

## 2020-12-16 NOTE — TELEPHONE ENCOUNTER
Pt asking to speak to [facility], states he stopped norvasc and BP increased from  120-130/80-85  To  140-150/95. Please call to advise.

## 2020-12-21 ENCOUNTER — TELEPHONE (OUTPATIENT)
Dept: PHARMACY | Facility: CLINIC | Age: 70
End: 2020-12-21

## 2020-12-30 NOTE — TELEPHONE ENCOUNTER
Unable to contact patient for Outcomes check-in. I have discussed the care of this patient with the student and I agree with the assessment/plan as documented. Ubaldo Meckel, PharmD, [facility] Fellow  Phone: [phone]  [phone], Option 7    CLINICAL PHARMACY CONSULT: MED RECONCILIATION/REVIEW ADDENDUM    For Pharmacy Admin Tracking Only    PHSO: Yes  Total # of Interventions Recommended: 1  - Updated Order #: 1 Updated Order Reason(s):  OtherRecommended intervention potential cost savings: 0  Total Interventions Accepted: 0  Time Spent (min): 15

## 2021-03-04 ENCOUNTER — IMMUNIZATION (OUTPATIENT)
Dept: PRIMARY CARE CLINIC | Age: 71
End: 2021-03-04
Payer: MEDICARE

## 2021-03-04 PROCEDURE — 91300 COVID-19, PFIZER VACCINE 30MCG/0.3ML DOSE: CPT | Performed by: FAMILY MEDICINE

## 2021-03-04 PROCEDURE — 0001A COVID-19, PFIZER VACCINE 30MCG/0.3ML DOSE: CPT | Performed by: FAMILY MEDICINE

## 2021-03-11 ENCOUNTER — OFFICE VISIT (OUTPATIENT)
Dept: FAMILY MEDICINE CLINIC | Age: 71
End: 2021-03-11
Payer: MEDICARE

## 2021-03-11 VITALS
DIASTOLIC BLOOD PRESSURE: 82 MMHG | WEIGHT: 261 LBS | HEIGHT: 71 IN | SYSTOLIC BLOOD PRESSURE: 124 MMHG | HEART RATE: 59 BPM | OXYGEN SATURATION: 97 % | BODY MASS INDEX: 36.54 KG/M2

## 2021-03-11 DIAGNOSIS — Z00.00 ROUTINE GENERAL MEDICAL EXAMINATION AT A HEALTH CARE FACILITY: Primary | ICD-10-CM

## 2021-03-11 DIAGNOSIS — I25.10 ATHEROSCLEROSIS OF NATIVE CORONARY ARTERY OF NATIVE HEART, ANGINA PRESENCE UNSPECIFIED: ICD-10-CM

## 2021-03-11 DIAGNOSIS — I10 ESSENTIAL HYPERTENSION: ICD-10-CM

## 2021-03-11 DIAGNOSIS — I10 ESSENTIAL HYPERTENSION, BENIGN: ICD-10-CM

## 2021-03-11 DIAGNOSIS — E66.9 OBESITY (BMI 30.0-34.9): ICD-10-CM

## 2021-03-11 DIAGNOSIS — R53.83 FATIGUE, UNSPECIFIED TYPE: ICD-10-CM

## 2021-03-11 DIAGNOSIS — K21.9 GASTROESOPHAGEAL REFLUX DISEASE WITHOUT ESOPHAGITIS: ICD-10-CM

## 2021-03-11 DIAGNOSIS — E78.2 MIXED HYPERLIPIDEMIA: ICD-10-CM

## 2021-03-11 DIAGNOSIS — Z87.891 PERSONAL HISTORY OF TOBACCO USE: ICD-10-CM

## 2021-03-11 DIAGNOSIS — Z12.5 PROSTATE CANCER SCREENING: ICD-10-CM

## 2021-03-11 DIAGNOSIS — Z87.891 FORMER TOBACCO USE: ICD-10-CM

## 2021-03-11 LAB
A/G RATIO: 1.3 (ref 1.1–2.2)
ACANTHOCYTES: ABNORMAL
ALBUMIN SERPL-MCNC: 4 G/DL (ref 3.4–5)
ALP BLD-CCNC: 94 U/L (ref 40–129)
ALT SERPL-CCNC: 60 U/L (ref 10–40)
ANION GAP SERPL CALCULATED.3IONS-SCNC: 13 MMOL/L (ref 3–16)
AST SERPL-CCNC: 40 U/L (ref 15–37)
BASOPHILS ABSOLUTE: 0 K/UL (ref 0–0.2)
BASOPHILS RELATIVE PERCENT: 0.4 %
BILIRUB SERPL-MCNC: 0.7 MG/DL (ref 0–1)
BUN BLDV-MCNC: 10 MG/DL (ref 7–20)
CALCIUM SERPL-MCNC: 9.8 MG/DL (ref 8.3–10.6)
CHLORIDE BLD-SCNC: 95 MMOL/L (ref 99–110)
CHOLESTEROL, FASTING: 134 MG/DL (ref 0–199)
CO2: 27 MMOL/L (ref 21–32)
CREAT SERPL-MCNC: 0.9 MG/DL (ref 0.8–1.3)
EOSINOPHILS ABSOLUTE: 0.2 K/UL (ref 0–0.6)
EOSINOPHILS RELATIVE PERCENT: 1.4 %
GFR AFRICAN AMERICAN: >60
GFR NON-AFRICAN AMERICAN: >60
GLOBULIN: 3.2 G/DL
GLUCOSE FASTING: 99 MG/DL (ref 70–99)
HCT VFR BLD CALC: 46.5 % (ref 40.5–52.5)
HDLC SERPL-MCNC: 47 MG/DL (ref 40–60)
HEMATOLOGY PATH CONSULT: NO
HEMOGLOBIN: 15.5 G/DL (ref 13.5–17.5)
LDL CHOLESTEROL CALCULATED: 59 MG/DL
LYMPHOCYTES ABSOLUTE: 5.3 K/UL (ref 1–5.1)
LYMPHOCYTES RELATIVE PERCENT: 47.9 %
MCH RBC QN AUTO: 29.5 PG (ref 26–34)
MCHC RBC AUTO-ENTMCNC: 33.3 G/DL (ref 31–36)
MCV RBC AUTO: 88.6 FL (ref 80–100)
MONOCYTES ABSOLUTE: 1.4 K/UL (ref 0–1.3)
MONOCYTES RELATIVE PERCENT: 12.9 %
NEUTROPHILS ABSOLUTE: 4.1 K/UL (ref 1.7–7.7)
NEUTROPHILS RELATIVE PERCENT: 37.4 %
OVALOCYTES: ABNORMAL
PDW BLD-RTO: 14.5 % (ref 12.4–15.4)
PLATELET # BLD: 332 K/UL (ref 135–450)
PMV BLD AUTO: 8.9 FL (ref 5–10.5)
POTASSIUM SERPL-SCNC: 4.8 MMOL/L (ref 3.5–5.1)
PROSTATE SPECIFIC ANTIGEN: 0.59 NG/ML (ref 0–4)
RBC # BLD: 5.25 M/UL (ref 4.2–5.9)
SLIDE REVIEW: ABNORMAL
SODIUM BLD-SCNC: 135 MMOL/L (ref 136–145)
TOTAL PROTEIN: 7.2 G/DL (ref 6.4–8.2)
TRIGLYCERIDE, FASTING: 142 MG/DL (ref 0–150)
TSH REFLEX: 1.73 UIU/ML (ref 0.27–4.2)
VLDLC SERPL CALC-MCNC: 28 MG/DL
WBC # BLD: 11 K/UL (ref 4–11)

## 2021-03-11 PROCEDURE — G0296 VISIT TO DETERM LDCT ELIG: HCPCS | Performed by: FAMILY MEDICINE

## 2021-03-11 PROCEDURE — G0439 PPPS, SUBSEQ VISIT: HCPCS | Performed by: FAMILY MEDICINE

## 2021-03-11 RX ORDER — ATENOLOL 100 MG/1
100 TABLET ORAL DAILY
Qty: 90 TABLET | Refills: 3 | Status: SHIPPED | OUTPATIENT
Start: 2021-03-11 | End: 2021-10-18 | Stop reason: SDUPTHER

## 2021-03-11 RX ORDER — LANSOPRAZOLE 30 MG/1
CAPSULE, DELAYED RELEASE ORAL
Qty: 90 CAPSULE | Refills: 0 | Status: SHIPPED | OUTPATIENT
Start: 2021-03-11 | End: 2021-06-10

## 2021-03-11 ASSESSMENT — LIFESTYLE VARIABLES
HOW OFTEN DURING THE LAST YEAR HAVE YOU FAILED TO DO WHAT WAS NORMALLY EXPECTED FROM YOU BECAUSE OF DRINKING: 0
AUDIT TOTAL SCORE: 2
HOW OFTEN DURING THE LAST YEAR HAVE YOU BEEN UNABLE TO REMEMBER WHAT HAPPENED THE NIGHT BEFORE BECAUSE YOU HAD BEEN DRINKING: 0
HAS A RELATIVE, FRIEND, DOCTOR, OR ANOTHER HEALTH PROFESSIONAL EXPRESSED CONCERN ABOUT YOUR DRINKING OR SUGGESTED YOU CUT DOWN: 0
HOW OFTEN DURING THE LAST YEAR HAVE YOU HAD A FEELING OF GUILT OR REMORSE AFTER DRINKING: 0
AUDIT-C TOTAL SCORE: 2

## 2021-03-11 ASSESSMENT — PATIENT HEALTH QUESTIONNAIRE - PHQ9: SUM OF ALL RESPONSES TO PHQ QUESTIONS 1-9: 0

## 2021-03-11 NOTE — PATIENT INSTRUCTIONS
Personalized Preventive Plan for Majel Phalen - 3/11/2021  Medicare offers a range of preventive health benefits. Some of the tests and screenings are paid in full while other may be subject to a deductible, co-insurance, and/or copay. Some of these benefits include a comprehensive review of your medical history including lifestyle, illnesses that may run in your family, and various assessments and screenings as appropriate. After reviewing your medical record and screening and assessments performed today your provider may have ordered immunizations, labs, imaging, and/or referrals for you. A list of these orders (if applicable) as well as your Preventive Care list are included within your After Visit Summary for your review. Other Preventive Recommendations:    · A preventive eye exam performed by an eye specialist is recommended every 1-2 years to screen for glaucoma; cataracts, macular degeneration, and other eye disorders. · A preventive dental visit is recommended every 6 months. · Try to get at least 150 minutes of exercise per week or 10,000 steps per day on a pedometer . · Order or download the FREE \"Exercise & Physical Activity: Your Everyday Guide\" from [employer]. Call 2-676.899.2916 or search [employer] online. · You need 7868-4527 mg of calcium and 0095-1508 IU of vitamin D per day. It is possible to meet your calcium requirement with diet alone, but a vitamin D supplement is usually necessary to meet this goal.  · When exposed to the sun, use a sunscreen that protects against both UVA and UVB radiation with an SPF of 30 or greater. Reapply every 2 to 3 hours or after sweating, drying off with a towel, or swimming. · Always wear a seat belt when traveling in a car. Always wear a helmet when riding a bicycle or motorcycle. What is lung cancer screening?   Lung cancer screening is a way in which doctors check the lungs for early signs of cancer in people who have no symptoms of lung cancer. A low-dose CT scan uses much less radiation than a normal CT scan and shows a more detailed image of the lungs than a standard X-ray. The goal of lung cancer screening is to find cancer early, before it has a chance to grow, spread, or cause problems. One large study found that smokers who were screened with low-dose CT scans were less likely to die of lung cancer than those who were screened with standard X-ray. Below is a summary of the things you need to know regarding screening for lung cancer with low-dose computed tomography (LDCT). This is a screening program that involves routine annual screening with LDCT studies of the lung. The LDCTs are done using low-dose radiation that is not thought to increase your cancer risk. If you have other serious medical conditions (other cancers, congestive heart failure) that limit your life expectancy to less than 10 years, you should not undergo lung cancer screening with LDCT. The chance is 20%-60% that the LDCT result will show abnormalities. This would require additional testing which could include repeat imaging or even invasive procedures. Most (about 95%) of \"abnormal\" LDCT results are false in the sense that no lung cancer is ultimately found. Additionally, some (about 10%) of the cancers found would not affect your life expectancy, even if undetected and untreated. If you are still smoking, the single most important thing that you can do to reduce your risk of dying of lung cancer is to quit. For this screening to be covered by Medicare and most other insurers, strict criteria must be met. If you do not meet these criteria, but still wish to undergo LDCT testing, you will be required to sign a waiver indicating your willingness to pay for the scan. What is lung cancer screening?   Lung cancer screening is a way in which doctors check the lungs for early signs of cancer in people who have no symptoms of lung cancer. A low-dose CT scan uses much less radiation than a normal CT scan and shows a more detailed image of the lungs than a standard X-ray. The goal of lung cancer screening is to find cancer early, before it has a chance to grow, spread, or cause problems. One large study found that smokers who were screened with low-dose CT scans were less likely to die of lung cancer than those who were screened with standard X-ray. Below is a summary of the things you need to know regarding screening for lung cancer with low-dose computed tomography (LDCT). This is a screening program that involves routine annual screening with LDCT studies of the lung. The LDCTs are done using low-dose radiation that is not thought to increase your cancer risk. If you have other serious medical conditions (other cancers, congestive heart failure) that limit your life expectancy to less than 10 years, you should not undergo lung cancer screening with LDCT. The chance is 20%-60% that the LDCT result will show abnormalities. This would require additional testing which could include repeat imaging or even invasive procedures. Most (about 95%) of \"abnormal\" LDCT results are false in the sense that no lung cancer is ultimately found. Additionally, some (about 10%) of the cancers found would not affect your life expectancy, even if undetected and untreated. If you are still smoking, the single most important thing that you can do to reduce your risk of dying of lung cancer is to quit. For this screening to be covered by Medicare and most other insurers, strict criteria must be met. If you do not meet these criteria, but still wish to undergo LDCT testing, you will be required to sign a waiver indicating your willingness to pay for the scan.

## 2021-03-11 NOTE — PROGRESS NOTES
Medicare Annual Wellness Visit  Name: Enmanuel Bay Date: 3/11/2021   MRN: [id number] Sex: Male   Age: 69 y.o. Ethnicity: Non-/Non    : 1950 Race: Ravi Martinez is here for Medicare AWV    Screenings for behavioral, psychosocial and functional/safety risks, and cognitive dysfunction are all negative except as indicated below. These results, as well as other patient data from the [address] form, are documented in Flowsheets linked to this Encounter. Allergies   Allergen Reactions    Adhesive Tape Other (See Comments)     \"skin turns red\"       Prior to Visit Medications    Medication Sig Taking? Authorizing Provider   lisinopril (PRINIVIL;ZESTRIL) 40 MG tablet TAKE 1 TABLET BY MOUTH EVERY DAY Yes Francy Fernandez MD   hydroCHLOROthiazide (HYDRODIURIL) 25 MG tablet Take 0.5 tablets by mouth every morning Yes Francy Fernandez MD   lansoprazole (PREVACID) 30 MG delayed release capsule TAKE 1 CAPSULE BY MOUTH ONE TIME A DAY  Yes Krista Blake MD   atorvastatin (LIPITOR) 80 MG tablet Take 0.5 tablets by mouth daily Yes Francy Fernandez MD   atenolol (TENORMIN) 100 MG tablet Take 1 tablet by mouth daily Yes Francy Fernandez MD   cetirizine (ZYRTEC) 10 MG tablet Take 10 mg by mouth every evening  Yes Historical Provider, MD   therapeutic multivitamin-minerals (THERAGRAN-M) tablet Take 1 tablet by mouth daily. Yes Historical Provider, MD   aspirin 81 MG EC tablet Take 81 mg by mouth daily.  Yes Historical Provider, MD       Past Medical History:   Diagnosis Date    ASHD (arteriosclerotic heart disease)     Cancer [facility] [address]     skin    Cold sore 10/2016    Colon polyp     COPD (chronic obstructive pulmonary disease) ([facility] [address]     NO ISSUES IN LAST 10 YEARS- QUIT SMOKING    History of blood transfusion     Hyperlipidemia     Hypertension     Kidney stone     Medial meniscus tear     RIGHT    MI (myocardial infarction) ([facility] [address] 2000    Obesity (BMI 30.0-34.9) 9/22/2016    Peyronie's disease 7/28/2011    PONV (postoperative nausea and vomiting)     Psoriasis     PUD (peptic ulcer disease)     Sinus infection 10/2016    Transfusion of, blood     pt has a history of receiving blood or blood product transfusion(s)       Past Surgical History:   Procedure Laterality Date    ANGIOPLASTY  2000    STENT X1    COLONOSCOPY      several    COLONOSCOPY  1/15/2020    COLONOSCOPY POLYPECTOMY SNARE/COLD BIOPSY performed by Sarah Donald MD at [address] [facility]  3/03    ureteral stone removed    KNEE ARTHROSCOPY  11/04/2016    right knee [employer]    OTHER SURGICAL HISTORY  11/04/2016    Right knee surgery repair    PTCA  1990    stent    SKIN CANCER EXCISION      on face  on nose and cheek    SPLENECTOMY  1955    UPPER GASTROINTESTINAL ENDOSCOPY         Family History   Problem Relation Age of Onset    Cancer Father         bladder    Hypertension Father     Heart Disease Father     High Blood Pressure Father     High Cholesterol Father     Heart Disease Mother     High Blood Pressure Mother     High Cholesterol Mother     Heart Attack Mother        CareTeam (Including outside providers/suppliers regularly involved in providing care):   Patient Care Team:  Sebastian Rachel MD as PCP - Lien Herrera MD as PCP - [facility] Empaneled Provider  Rosalee Jerez MD as Consulting Physician (Cardiology)  Vidal Mims RN as Nurse Navigator  Cardio - [facility]  Podiatrist ( neuroma)  [facility]  Dentist - [facility] Readings from Last 3 Encounters:   03/11/21 261 lb (118.4 kg)   10/26/20 266 lb (120.7 kg)   01/16/20 264 lb (119.7 kg)     Vitals:    03/11/21 0836   BP: 124/82   Pulse: 59   SpO2: 97%   Weight: 261 lb (118.4 kg)   Height: 5' 11\" (1.803 m)     Body mass index is 36.4 kg/m².     Based upon direct observation of the patient, evaluation of cognition reveals recent and remote memory intact. General Appearance: alert and oriented to person, place and time, well-developed and well-nourished, in no acute distress  ENT: tympanic membrane, external ear and ear canal normal bilaterally, oropharynx clear and moist with normal mucous membranes  Neck: neck supple and non tender without mass, no thyromegaly or thyroid nodules, no cervical lymphadenopathy   Pulmonary/Chest: clear to auscultation bilaterally- no wheezes, rales or rhonchi, normal air movement, no respiratory distress  Cardiovascular: normal rate, normal S1 and S2, no gallops, intact distal pulses and no carotid bruits  Abdomen: soft, non-tender, non-distended, normal bowel sounds, no masses or organomegaly  Extremities: no cyanosis and no clubbing    Patient's complete Health Risk Assessment and screening values have been reviewed and are found in Flowsheets. The following problems were reviewed today and where indicated follow up appointments were made and/or referrals ordered. Positive Risk Factor Screenings with Interventions:          General Health and ACP:  General  In general, how would you say your health is?: Good  In the past 7 days, have you experienced any of the following?  New or Increased Pain, New or Increased Fatigue, Loneliness, Social Isolation, Stress or Anger?: None of These  Do you get the social and emotional support that you need?: Yes  Do you have a Living Will?: Yes  Advance Directives     Power of [address] [place] [address] Will ACP-Advance Directive ACP-Power of     Not on File Not on File Not on File Not on File      General Health Risk Interventions:  · Stress: relaxation techniques discussed    Health Habits/Nutrition:  Health Habits/Nutrition  Do you exercise for at least 20 minutes 2-3 times per week?: (!) No  Have you lost any weight without trying in the past 3 months?: No  Do you eat only one meal per day?: No  Have you seen the dentist within the past year?: Yes  Body mass index: (!) 36.4  Health Habits/Nutrition Interventions:  · Inadequate physical activity:  patient agrees to exercise for at least 150 minutes/week    Hearing/Vision:  No exam data present  Hearing/Vision  Do you or your family notice any trouble with your hearing that hasn't been managed with hearing aids?: (!) Yes  Do you have difficulty driving, watching TV, or doing any of your daily activities because of your eyesight?: No  Have you had an eye exam within the past year?: Yes  Hearing/Vision Interventions:  · Hearing concerns:  patient declines any further evaluation/treatment for hearing issues    Safety:  Safety  Do you have working smoke detectors?: Yes  Have all throw rugs been removed or fastened?: (!) No  Do you have non-slip mats or surfaces in all bathtubs/showers?: Yes  Do all of your stairways have a railing or banister?: Yes  Are your doorways, halls and stairs free of clutter?: Yes  Do you always fasten your seatbelt when you are in a car?: Yes  Safety Interventions:  · Home safety tips provided     Personalized Preventive Plan   Current Health Maintenance Status  Immunization History   Administered Date(s) Administered    COVID-19, Pfizer, PF, 30mcg/0.3mL 03/04/2021    Influenza Virus Vaccine 01/01/2013, 11/05/2014, 09/15/2015    Influenza, High Dose (Fluzone 65 yrs and older) 09/05/2016, 10/02/2018, 10/20/2020    Pneumococcal Conjugate 13-valent ([id number]) 01/13/2016    Pneumococcal Polysaccharide ([id number]) 01/17/2019    Tdap (Boostrix, Adacel) 02/05/2007, 08/06/2017        Health Maintenance   Topic Date Due    Meningococcal (ACWY) vaccine (1 - Risk start before 7 months 4-dose series) Never done    Hib vaccine (1 of 1 - Risk 1-dose series) Never done    Meningococcal B vaccine (1 of 4 - Increased Risk Bexsero 2-dose series) Never done    Shingles Vaccine (1 of 2) Never done   [employer] Visit (AWV)  Never done    Lipid screen  01/16/2021    Potassium monitoring  01/16/2021    Creatinine monitoring  01/16/2021    Low dose CT lung screening  01/27/2021    COVID-19 Vaccine (2 of 2 - Pfizer series) 03/25/2021    DTaP/Tdap/Td vaccine (3 - Td) 08/06/2027    Colon cancer screen colonoscopy  01/15/2030    Flu vaccine  Completed    Pneumococcal 65+ yrs at Risk Vaccine  Completed    AAA screen  Completed    Hepatitis C screen  Completed    Hepatitis A vaccine  Aged Out    Hepatitis B vaccine  Aged Out     Recommendations for [employer] Risk Due: see orders and patient instructions/AVS.  . Recommended screening schedule for the next 5-10 years is provided to the patient in written form: see Patient Instructions/AVS.    Enrique Flower was seen today for medicare aw. Diagnoses and all orders for this visit:    Routine general medical examination at a health care facility  Encounter Diagnoses   Name Primary?  Routine general medical examination at a health care facility Yes    Essential hypertension, benign     Atherosclerosis of native coronary artery of native heart, angina presence unspecified stable    Obesity (BMI 30.0-34. 9) Wt dwon 5 #   Mild   covid - mild, recovered  Former tob  [place] - mild  Right foot neuroma    Labs  Refills  Try to wean off PPI  Low dose chest ct scan  More exercise   get second covid, then shingle

## 2021-03-24 ENCOUNTER — HOSPITAL ENCOUNTER (OUTPATIENT)
Dept: CT IMAGING | Age: 71
Discharge: HOME | End: 2021-03-24
Payer: MEDICARE

## 2021-03-24 ENCOUNTER — TELEPHONE (OUTPATIENT)
Dept: CASE MANAGEMENT | Age: 71
End: 2021-03-24

## 2021-03-24 DIAGNOSIS — Z87.891 FORMER TOBACCO USE: ICD-10-CM

## 2021-03-24 PROCEDURE — 71271 CT THORAX LUNG CANCER SCR C-: CPT

## 2021-03-24 NOTE — TELEPHONE ENCOUNTER
Chart reviewed including smoking history and recent lung scan results.  Will follow in the Lung Nodule Program.

## 2021-03-25 ENCOUNTER — IMMUNIZATION (OUTPATIENT)
Dept: PRIMARY CARE CLINIC | Age: 71
End: 2021-03-25
Payer: MEDICARE

## 2021-03-25 PROCEDURE — 91300 COVID-19, PFIZER VACCINE 30MCG/0.3ML DOSE: CPT | Performed by: FAMILY MEDICINE

## 2021-03-25 PROCEDURE — 0002A COVID-19, PFIZER VACCINE 30MCG/0.3ML DOSE: CPT | Performed by: FAMILY MEDICINE

## 2021-04-23 NOTE — PROGRESS NOTES
[address]  4/26/21  Referring: Dr. Kourtney Hooper CONSULT/CHIEF COMPLAINT/HPI     Reason for visit/ Chief complaint  Follow up  CAD   HPI Mary Ann Gloria is a 69 y.o.  is here today for regular f/u of his CAD, HTN, hyperlipidemia. He is now fully retired from bus driving. He smoked from a young age until 2009. His daughter had previous stenting, at age 48. Previous ulcer, placed on prevacid. He has since quit drinking. CT of lungs show a stable nodule, with emphysema and cholecystitis. For the past three years, he has used a cart to play 18 holes, because walking 18 holes caused fatigue. Typically worsens in hot weather. He walks on the treadmill 30 minutes (3.5-4 speed) at [email or web address] three to four times a week. Does walk on an incline, has shortness of breath while working out. No chest pain, palpitations dizziness or syncope. He used to play competitive volleyball. The night before his stent he had played three sets of volleyball, and drank a few beers after. Prior his stenting he had wakened up with severe epigastric pain, associated with diaphoresis. He has not had any of this. Weight has gradually increased. Patient is compliant with medications and is tolerating them well without side effects     HISTORY/ALLERGIES/ROS     MedHx:  has a past medical history of ASHD (arteriosclerotic heart disease), Cancer ([address] Cold sore, Colon polyp, COPD (chronic obstructive pulmonary disease) ([address] History of blood transfusion, Hyperlipidemia, Hypertension, Kidney stone, Medial meniscus tear, MI (myocardial infarction) ([address] Obesity (BMI 30.0-34.9), Peyronie's disease, PONV (postoperative nausea and vomiting), Psoriasis, PUD (peptic ulcer disease), Sinus infection, and Transfusion of, blood. SurgHx:  has a past surgical history that includes Splenectomy (1955); Percutaneous Transluminal Coronary Angio (1990); Kidney stone surgery (3/03);  Skin cancer excision (); angioplasty (2000); Colonoscopy; Upper gastrointestinal endoscopy; other surgical history (11/04/2016); Knee arthroscopy (11/04/2016); and Colonoscopy (1/15/2020). SocHx:  reports that he quit smoking about 12 years ago. He has a 45.00 pack-year smoking history. He has never used smokeless tobacco. He reports current alcohol use of about 2.0 standard drinks of alcohol per week. He reports that he does not use drugs. FamHx: No family history of premature coronary artery disease, sudden death, or aneurysm  Allergies: Adhesive tape   ROS:   Review of Systems   Constitutional: Positive for fatigue. Negative for activity change, diaphoresis and fever. HENT: Negative for congestion and ear discharge. Eyes: Negative for photophobia and visual disturbance. Respiratory: Positive for shortness of breath. Negative for cough and chest tightness. Cardiovascular: Negative for chest pain and palpitations. Gastrointestinal: Negative for abdominal distention, abdominal pain, blood in stool and nausea. Endocrine: Negative for cold intolerance and polydipsia. Genitourinary: Negative for difficulty urinating and flank pain. Musculoskeletal: Positive for arthralgias and myalgias. Skin: Negative for rash and wound. Allergic/Immunologic: Negative for environmental allergies and immunocompromised state. Neurological: Negative for dizziness and headaches. Hematological: Negative for adenopathy. Does not bruise/bleed easily. Psychiatric/Behavioral: Negative for confusion. The patient is not hyperactive. MEDICATIONS      Prior to Admission medications    Medication Sig Start Date End Date Taking?  Authorizing Provider   Coenzyme Q10 400 MG CAPS Take by mouth   Yes Historical Provider, MD   atenolol (TENORMIN) 100 MG tablet Take 1 tablet by mouth daily 3/11/21  Yes Jazz Sparks MD   lansoprazole (PREVACID) 30 MG delayed release capsule 1 po qod 3/11/21  Yes Jazz Sparks MD   lisinopril (PRINIVIL;ZESTRIL) 40 MG tablet TAKE 1 TABLET BY MOUTH EVERY DAY 12/16/20  Yes Babatunde Abrams MD   hydroCHLOROthiazide (HYDRODIURIL) 25 MG tablet Take 0.5 tablets by mouth every morning 12/16/20  Yes Babatunde Abrams MD   atorvastatin (LIPITOR) 80 MG tablet Take 0.5 tablets by mouth daily 11/30/20  Yes Babatunde Abrams MD   cetirizine (ZYRTEC) 10 MG tablet Take 10 mg by mouth every evening    Yes Historical Provider, MD   therapeutic multivitamin-minerals (THERAGRAN-M) tablet Take 1 tablet by mouth daily. Yes Historical Provider, MD   aspirin 81 MG EC tablet Take 81 mg by mouth daily. Yes Historical Provider, MD       PHYSICAL EXAM        Vitals:    04/26/21 0910   BP: 134/84   Pulse:    SpO2:     Weight: 262 lb 9.6 oz (119.1 kg)     Gen Alert, cooperative, no distress Heart  Regular rate and rhythm   Head Normocephalic, atraumatic, no abnormalities Abd  Soft, NT, +BS, healed incision    Eyes PERRLA, conj/corn clear Ext  Ext nl, AT, no C/C, trace edema   Nose Nares normal, no drain age, Non-tender Pulse 2+ and symmetric   Throat Lips, mucosa, tongue normal Skin Color/text/turg nl, no rash/lesions   Neck S/S, TM, NT, no bruit Psych Nl mood and affect   Lung CTA-B, unlabored, no DTP     Ch wall NT, no deform       LABS and Imaging     Relevant and available CV data reviewed  Echo/MRI: 11/13/2019  Echo---Normal left ventricle size,moderate wall thickness and normal systolic   function with an estimated ejection fraction of 55-60%.  -No regional wall motion abnormalities are seen.   -Normal diastolic function. E/e\"=7.45.  -Mild mitral regurgitation.  -Aortic valve appears sclerotic but opens adequately.  -Mild tricuspid regurgitation.   -Estimated pulmonary artery systolic pressure is normal at 28-33 mmHg   assuming a right atrial pressure of 3 mmHg. 11/9/2020 stress echo  No ischemic changes. Non-diagnostic due to failure to reach target heart rate likely secondary to  beta blocker therapy. Frequent premature atrial contractions. Frequent premature ventricular contractions. -2017  abd doppler-- proximal aorta 2.5x2.6  Cath:  IWMI RCA stenting  Holter:  holter monitor  EK NSR, personally interpreted  Stress:   Rest HR: 51 bpm                 HR BP Product: [id number]   Rest BP: 134/88 mmHg            Max Exercise: 7 METS   Stress Peak HR: 104 bpm   Stress Peak BP: 145/75 mmHg   Predicted HR: 150 bpm   % of predicted HR: 69   Test Duration: 7 min and 20 sec   Reason for Termination: Fatigue      Results      Echo (rest): Normal (LVEF >50%)      Echo (stress): Hyperkinetic (LVEF >70%)      Echo   Baseline resting echocardiogram shows normal global LV systolic function   with an ejection fraction of 60% and uniform myocardial segmental wall   motion. Following stress there was uniform augmentation of all myocardial   segments with appropriate hyperdynamic LV systolic response to stress. ECG   No ischemic EKG changes. Non-diagnostic due to failure to reach target heart rate likely secondary to   beta blocker therapy. Arrhythmias   Frequent premature atrial contractions. Frequent premature ventricular contractions. Ct of lung --emphysema with stable pulmonary nodules, choleylithiasis  moderate Risk  moderate Complexity/Medical Decision Making  Outside records Reviewed  Labs Reviewed  Prior Imaging, ekg, cath, echo reviewed when available  Medications reviewed  Old Notes reviewed  ASSESSMENT AND PLAN     1. Stable coronary artery disease  - 2000 RCA stenting after IWMI  - on asa 81 mg daily  - recent submaximal stress test without ischemia, achieved 7 mets, HR and BP achieved appropriately  Plan  - aspirin, statin, betablocker  - call for any changes  - consider nuclear stress test if worsening symptoms     2.  Essential Hypertension  -136/86, repeated by me 134/84  - norvasc previously caused edema  - on tenormin 100 mg daily  - on lisinopril 40mg every day  - on hctz 12.5 mg daily  - worsening  Plan  - will trend blood pressure  - goal less than 130/80  - if remains elevated will increase hctz  - recommend weight loss    3 Mixedhyperlipidemia  - on lipitor 80 mg daily  - stable, at goal  Plan  - continue lipitor    4. Obesity  - worsening  Plan  - recommend weight loss and exercise, 20 lbs      Patient counseled on lifestyle modification, diet, and exercise. Follow Up:   6 months  Dr. Kraig Alan:  [employer], am scribing for and in the presence of Payal Schmitz MD.   Shawna Goins 04/26/21 9:56 AM   Physician Attestation  The scribe for and in the presence of keny Solorzano DO). The scribe Alyce Pearce may have prepopulated components of this note with my historical  intellectual property under my direct supervision. Any additions to this intellectual property were performed in my presence and at my direction.   Furthermore, the content and accuracy of this note have been reviewed by keny Solorzano DO).  4/26/2021 9:56 AM

## 2021-04-26 ENCOUNTER — OFFICE VISIT (OUTPATIENT)
Dept: CARDIOLOGY CLINIC | Age: 71
End: 2021-04-26
Payer: MEDICARE

## 2021-04-26 VITALS
HEIGHT: 71 IN | DIASTOLIC BLOOD PRESSURE: 84 MMHG | OXYGEN SATURATION: 96 % | HEART RATE: 60 BPM | WEIGHT: 262.6 LBS | SYSTOLIC BLOOD PRESSURE: 134 MMHG | BODY MASS INDEX: 36.76 KG/M2

## 2021-04-26 DIAGNOSIS — I10 ESSENTIAL HYPERTENSION, BENIGN: ICD-10-CM

## 2021-04-26 DIAGNOSIS — E66.09 CLASS 2 OBESITY DUE TO EXCESS CALORIES WITHOUT SERIOUS COMORBIDITY WITH BODY MASS INDEX (BMI) OF 36.0 TO 36.9 IN ADULT: ICD-10-CM

## 2021-04-26 DIAGNOSIS — I25.10 ATHEROSCLEROSIS OF NATIVE CORONARY ARTERY OF NATIVE HEART, ANGINA PRESENCE UNSPECIFIED: Primary | ICD-10-CM

## 2021-04-26 DIAGNOSIS — E78.5 HYPERLIPIDEMIA, UNSPECIFIED HYPERLIPIDEMIA TYPE: ICD-10-CM

## 2021-04-26 PROCEDURE — 99203 OFFICE O/P NEW LOW 30 MIN: CPT | Performed by: INTERNAL MEDICINE

## 2021-04-26 ASSESSMENT — ENCOUNTER SYMPTOMS
CHEST TIGHTNESS: 0
ABDOMINAL DISTENTION: 0
COUGH: 0
PHOTOPHOBIA: 0
NAUSEA: 0
SHORTNESS OF BREATH: 1
ABDOMINAL PAIN: 0
BLOOD IN STOOL: 0

## 2021-04-26 NOTE — Clinical Note
Doing okay from heart perspective, back at the gym. Will work on weight loss.  If bp > 130/80, will increase hctz to 25

## 2021-04-26 NOTE — PATIENT INSTRUCTIONS
How to take a blood pressure by the American [employer] Association   - don't smoke drink caffeinated beverages or exercise within 30 minutes before measuring your blood pressure. Empty your bladder and ensure at least 5 minutes of quiet rest before measurement. - sit with you back straight and supported ( on a dining chair, rather than a sofa) your feet should be flat on the floor and your legs should not be crossed. Our arm should be supported on a flat surface (such as a table) with the upper arm at heart level. Make sure the bottom of the cuff is placed directly above the bend of the elbow. - don't take the measurements over clothes.     Date             Blood Pressure          Comment                                                 Date             Blood Pressure          Comment      ----------          -------------------          ---------------------------------                         ----------          -------------------          ---------------------------------        ----------          -------------------          ---------------------------------                         ----------          -------------------          ---------------------------------        ----------          -------------------          ---------------------------------                         ----------          -------------------          ---------------------------------        ----------          -------------------          ---------------------------------                        ----------          -------------------          ---------------------------------        ----------          -------------------          ---------------------------------                        ----------          -------------------          ---------------------------------      Call or send via My Chart above readings if consistently over 130/80    Follow up in 6 month

## 2021-06-10 DIAGNOSIS — K21.9 GASTROESOPHAGEAL REFLUX DISEASE WITHOUT ESOPHAGITIS: ICD-10-CM

## 2021-06-10 RX ORDER — LANSOPRAZOLE 30 MG/1
CAPSULE, DELAYED RELEASE ORAL
Qty: 90 CAPSULE | Refills: 1 | Status: SHIPPED | OUTPATIENT
Start: 2021-06-10 | End: 2021-10-20 | Stop reason: SDUPTHER

## 2021-10-18 ENCOUNTER — HOSPITAL ENCOUNTER (OUTPATIENT)
Age: 71
Discharge: HOME | End: 2021-10-18
Payer: MEDICARE

## 2021-10-18 ENCOUNTER — TELEPHONE (OUTPATIENT)
Dept: CARDIOLOGY CLINIC | Age: 71
End: 2021-10-18

## 2021-10-18 ENCOUNTER — OFFICE VISIT (OUTPATIENT)
Dept: CARDIOLOGY CLINIC | Age: 71
End: 2021-10-18
Payer: MEDICARE

## 2021-10-18 ENCOUNTER — HOSPITAL ENCOUNTER (OUTPATIENT)
Dept: GENERAL RADIOLOGY | Age: 71
Discharge: HOME | End: 2021-10-18
Payer: MEDICARE

## 2021-10-18 VITALS
BODY MASS INDEX: 36.68 KG/M2 | WEIGHT: 262 LBS | DIASTOLIC BLOOD PRESSURE: 74 MMHG | SYSTOLIC BLOOD PRESSURE: 126 MMHG | HEART RATE: 66 BPM | HEIGHT: 71 IN | OXYGEN SATURATION: 96 %

## 2021-10-18 DIAGNOSIS — R05.9 COUGH: ICD-10-CM

## 2021-10-18 DIAGNOSIS — E66.9 OBESITY (BMI 30.0-34.9): ICD-10-CM

## 2021-10-18 DIAGNOSIS — I25.118 ATHEROSCLEROSIS OF NATIVE CORONARY ARTERY OF NATIVE HEART WITH STABLE ANGINA PECTORIS (HCC): Primary | ICD-10-CM

## 2021-10-18 DIAGNOSIS — E78.5 HYPERLIPIDEMIA, UNSPECIFIED HYPERLIPIDEMIA TYPE: ICD-10-CM

## 2021-10-18 DIAGNOSIS — I10 ESSENTIAL HYPERTENSION: ICD-10-CM

## 2021-10-18 DIAGNOSIS — E78.2 MIXED HYPERLIPIDEMIA: ICD-10-CM

## 2021-10-18 DIAGNOSIS — I25.10 ATHEROSCLEROSIS OF NATIVE CORONARY ARTERY OF NATIVE HEART, UNSPECIFIED WHETHER ANGINA PRESENT: ICD-10-CM

## 2021-10-18 PROCEDURE — 99214 OFFICE O/P EST MOD 30 MIN: CPT | Performed by: INTERNAL MEDICINE

## 2021-10-18 PROCEDURE — 71046 X-RAY EXAM CHEST 2 VIEWS: CPT

## 2021-10-18 RX ORDER — HYDROCHLOROTHIAZIDE 25 MG/1
12.5 TABLET ORAL EVERY MORNING
Qty: 90 TABLET | Refills: 3 | Status: SHIPPED | OUTPATIENT
Start: 2021-10-18 | End: 2022-10-27

## 2021-10-18 RX ORDER — ATENOLOL 100 MG/1
100 TABLET ORAL DAILY
Qty: 90 TABLET | Refills: 3 | Status: SHIPPED | OUTPATIENT
Start: 2021-10-18 | End: 2022-10-27 | Stop reason: SDUPTHER

## 2021-10-18 RX ORDER — LISINOPRIL 40 MG/1
40 TABLET ORAL DAILY
Qty: 90 TABLET | Refills: 3 | Status: SHIPPED | OUTPATIENT
Start: 2021-10-18 | End: 2022-10-27

## 2021-10-18 RX ORDER — ATORVASTATIN CALCIUM 80 MG/1
40 TABLET, FILM COATED ORAL DAILY
Qty: 45 TABLET | Refills: 3 | Status: SHIPPED | OUTPATIENT
Start: 2021-10-18 | End: 2022-10-27 | Stop reason: SDUPTHER

## 2021-10-18 ASSESSMENT — ENCOUNTER SYMPTOMS
BLOOD IN STOOL: 0
PHOTOPHOBIA: 0
NAUSEA: 0
COUGH: 0
ABDOMINAL PAIN: 0
SHORTNESS OF BREATH: 0
CHEST TIGHTNESS: 0
ABDOMINAL DISTENTION: 0

## 2021-10-18 NOTE — Clinical Note
Diane Preston is doing okay. He is having some chest congestion for 3 months. Will obtain chest xray and start on flonase. May need further eval if continues. His right hip has been bothering him as well.   Thanks  Keshia Hernandez

## 2021-10-18 NOTE — PROGRESS NOTES
excision (); angioplasty (2000); Colonoscopy; Upper gastrointestinal endoscopy; other surgical history (11/04/2016); Knee arthroscopy (11/04/2016); and Colonoscopy (1/15/2020). SocHx:  reports that he quit smoking about 12 years ago. He has a 45.00 pack-year smoking history. He has never used smokeless tobacco. He reports current alcohol use of about 2.0 standard drinks of alcohol per week. He reports that he does not use drugs. FamHx: No family history of premature coronary artery disease, sudden death, or aneurysm  Allergies: Adhesive tape   ROS:   Review of Systems   Constitutional: Positive for fatigue. Negative for activity change, diaphoresis and fever. HENT: Positive for congestion. Negative for ear discharge. Eyes: Negative for photophobia and visual disturbance. Respiratory: Negative for cough, chest tightness and shortness of breath. Cardiovascular: Negative for chest pain and palpitations. Gastrointestinal: Negative for abdominal distention, abdominal pain, blood in stool and nausea. Endocrine: Negative for cold intolerance and polydipsia. Genitourinary: Negative for difficulty urinating and flank pain. Musculoskeletal: Positive for arthralgias and myalgias. Skin: Negative for rash and wound. Allergic/Immunologic: Negative for environmental allergies and immunocompromised state. Neurological: Negative for dizziness and headaches. Hematological: Negative for adenopathy. Does not bruise/bleed easily. Psychiatric/Behavioral: Negative for confusion. The patient is not hyperactive. MEDICATIONS      Prior to Admission medications    Medication Sig Start Date End Date Taking?  Authorizing Provider   lansoprazole (PREVACID) 30 MG delayed release capsule TAKE 1 CAPSULE BY MOUTH EVERY OTHER DAY  Patient taking differently: Take 15 mg by mouth daily TAKE 1 CAPSULE BY MOUTH EVERY OTHER DAY  6/10/21  Yes Roxy Goddard MD   Coenzyme Q10 400 MG CAPS Take by mouth   Yes Historical Provider, MD   atenolol (TENORMIN) 100 MG tablet Take 1 tablet by mouth daily 3/11/21  Yes Excell Osler, MD   lisinopril (PRINIVIL;ZESTRIL) 40 MG tablet TAKE 1 TABLET BY MOUTH EVERY DAY 12/16/20  Yes Johnny Van MD   hydroCHLOROthiazide (HYDRODIURIL) 25 MG tablet Take 0.5 tablets by mouth every morning 12/16/20  Yes Johnny Van MD   atorvastatin (LIPITOR) 80 MG tablet Take 0.5 tablets by mouth daily 11/30/20  Yes Johnny Van MD   cetirizine (ZYRTEC) 10 MG tablet Take 10 mg by mouth every evening    Yes Historical Provider, MD   therapeutic multivitamin-minerals (THERAGRAN-M) tablet Take 1 tablet by mouth daily. Yes Historical Provider, MD   aspirin 81 MG EC tablet Take 81 mg by mouth daily. Yes Historical Provider, MD       PHYSICAL EXAM        Vitals:    10/18/21 0728   BP: 126/74   Pulse: 66   SpO2: 96%    Weight: 262 lb (118.8 kg)     Gen Alert, cooperative, no distress Heart  Regular rate and rhythm   Head Normocephalic, atraumatic, no abnormalities Abd  Soft, NT, +BS, healed incision    Eyes PERRLA, conj/corn clear Ext  Ext nl, AT, no C/C, trace edema   Nose Nares normal, no drain age, Non-tender Pulse 2+ and symmetric   Throat Lips, mucosa, tongue normal Skin Color/text/turg nl, no rash/lesions   Neck S/S, TM, NT, no bruit Psych Nl mood and affect   Lung CTA-B, unlabored, no DTP     Ch wall NT, no deform       LABS and Imaging     Relevant and available CV data reviewed  Echo/MRI: 11/13/2019  Echo---Normal left ventricle size,moderate wall thickness and normal systolic   function with an estimated ejection fraction of 55-60%.  -No regional wall motion abnormalities are seen.   -Normal diastolic function. E/e\"=7.45.  -Mild mitral regurgitation.  -Aortic valve appears sclerotic but opens adequately.  -Mild tricuspid regurgitation.   -Estimated pulmonary artery systolic pressure is normal at 28-33 mmHg   assuming a right atrial pressure of 3 mmHg.   11/9/2020 stress echo  No ischemic changes. Non-diagnostic due to failure to reach target heart rate likely secondary to  beta blocker therapy. Frequent premature atrial contractions. Frequent premature ventricular contractions. -2017  abd doppler-- proximal aorta 2.5 x 2.6  3/24/2021  Chest ct small nodules emphysema incidental gallstones, diffuse CAD  Cath:  IWMI RCA stenting  Holter:  holter monitor  EK NSR, personally interpreted  Stress:   Rest HR: 51 bpm                 HR BP Product: 20235   Rest BP: 134/88 mmHg            Max Exercise: 7 METS   Stress Peak HR: 104 bpm   Stress Peak BP: 145/75 mmHg   Predicted HR: 150 bpm   % of predicted HR: 69   Test Duration: 7 min and 20 sec   Reason for Termination: Fatigue      Results      Echo (rest): Normal (LVEF >50%)      Echo (stress): Hyperkinetic (LVEF >70%)      Echo   Baseline resting echocardiogram shows normal global LV systolic function   with an ejection fraction of 60% and uniform myocardial segmental wall   motion. Following stress there was uniform augmentation of all myocardial   segments with appropriate hyperdynamic LV systolic response to stress. ECG   No ischemic EKG changes. Non-diagnostic due to failure to reach target heart rate likely secondary to   beta blocker therapy. Arrhythmias   Frequent premature atrial contractions. Frequent premature ventricular contractions. Ct of lung --emphysema with stable pulmonary nodules, choleylithiasis  moderate Risk  moderate Complexity/Medical Decision Making  Outside records Reviewed  Labs Reviewed  Prior Imaging, ekg, cath, echo reviewed when available  Medications reviewed  Old Notes reviewed  ASSESSMENT AND PLAN     1.  Stable coronary artery disease  -  RCA stenting after IWMI  - on asa 81 mg daily  - recent submaximal stress test without ischemia, achieved 7 mets, HR and BP achieved appropriately  Plan  - aspirin, statin, betablocker  - call for any changes  - consider nuclear stress

## 2021-10-18 NOTE — TELEPHONE ENCOUNTER
Called with chest xray results, per dkw if patient is not feeling better in three weeks, after seeing pcp( c/ocongestion) will do a nuclear stress test

## 2021-10-20 ENCOUNTER — HOSPITAL ENCOUNTER (OUTPATIENT)
Dept: GENERAL RADIOLOGY | Age: 71
Discharge: HOME | End: 2021-10-20
Payer: MEDICARE

## 2021-10-20 ENCOUNTER — OFFICE VISIT (OUTPATIENT)
Dept: FAMILY MEDICINE CLINIC | Age: 71
End: 2021-10-20
Payer: MEDICARE

## 2021-10-20 ENCOUNTER — HOSPITAL ENCOUNTER (OUTPATIENT)
Age: 71
Discharge: HOME | End: 2021-10-20
Payer: MEDICARE

## 2021-10-20 VITALS
HEIGHT: 71 IN | DIASTOLIC BLOOD PRESSURE: 80 MMHG | HEART RATE: 52 BPM | BODY MASS INDEX: 36.54 KG/M2 | WEIGHT: 261 LBS | SYSTOLIC BLOOD PRESSURE: 122 MMHG | OXYGEN SATURATION: 95 %

## 2021-10-20 DIAGNOSIS — K21.9 GASTROESOPHAGEAL REFLUX DISEASE WITHOUT ESOPHAGITIS: ICD-10-CM

## 2021-10-20 DIAGNOSIS — J30.2 SEASONAL ALLERGIES: ICD-10-CM

## 2021-10-20 DIAGNOSIS — M25.551 RIGHT HIP PAIN: ICD-10-CM

## 2021-10-20 DIAGNOSIS — Z13.1 SCREENING FOR DIABETES MELLITUS: ICD-10-CM

## 2021-10-20 DIAGNOSIS — M25.551 RIGHT HIP PAIN: Primary | ICD-10-CM

## 2021-10-20 DIAGNOSIS — I10 ESSENTIAL HYPERTENSION, BENIGN: ICD-10-CM

## 2021-10-20 DIAGNOSIS — Z23 NEED FOR VACCINATION: ICD-10-CM

## 2021-10-20 LAB
A/G RATIO: 1.2 (ref 1.1–2.2)
ALBUMIN SERPL-MCNC: 4 G/DL (ref 3.4–5)
ALP BLD-CCNC: 92 U/L (ref 40–129)
ALT SERPL-CCNC: 54 U/L (ref 10–40)
ANION GAP SERPL CALCULATED.3IONS-SCNC: 9 MMOL/L (ref 3–16)
AST SERPL-CCNC: 40 U/L (ref 15–37)
BILIRUB SERPL-MCNC: 0.5 MG/DL (ref 0–1)
BUN BLDV-MCNC: 14 MG/DL (ref 7–20)
CALCIUM SERPL-MCNC: 9.8 MG/DL (ref 8.3–10.6)
CHLORIDE BLD-SCNC: 96 MMOL/L (ref 99–110)
CO2: 27 MMOL/L (ref 21–32)
CREAT SERPL-MCNC: 1 MG/DL (ref 0.8–1.3)
GFR AFRICAN AMERICAN: >60
GFR NON-AFRICAN AMERICAN: >60
GLOBULIN: 3.3 G/DL
GLUCOSE BLD-MCNC: 95 MG/DL (ref 70–99)
POTASSIUM SERPL-SCNC: 5.1 MMOL/L (ref 3.5–5.1)
SODIUM BLD-SCNC: 132 MMOL/L (ref 136–145)
TOTAL PROTEIN: 7.3 G/DL (ref 6.4–8.2)

## 2021-10-20 PROCEDURE — 73502 X-RAY EXAM HIP UNI 2-3 VIEWS: CPT

## 2021-10-20 PROCEDURE — 90694 VACC AIIV4 NO PRSRV 0.5ML IM: CPT | Performed by: STUDENT IN AN ORGANIZED HEALTH CARE EDUCATION/TRAINING PROGRAM

## 2021-10-20 PROCEDURE — G0008 ADMIN INFLUENZA VIRUS VAC: HCPCS | Performed by: STUDENT IN AN ORGANIZED HEALTH CARE EDUCATION/TRAINING PROGRAM

## 2021-10-20 PROCEDURE — 99214 OFFICE O/P EST MOD 30 MIN: CPT | Performed by: STUDENT IN AN ORGANIZED HEALTH CARE EDUCATION/TRAINING PROGRAM

## 2021-10-20 RX ORDER — LANSOPRAZOLE 30 MG/1
CAPSULE, DELAYED RELEASE ORAL
Qty: 90 CAPSULE | Refills: 1 | Status: SHIPPED | OUTPATIENT
Start: 2021-10-20 | End: 2022-07-29

## 2021-10-20 RX ORDER — LIDOCAINE 50 MG/G
1 PATCH TOPICAL DAILY
Qty: 10 PATCH | Refills: 0 | Status: SHIPPED | OUTPATIENT
Start: 2021-10-20 | End: 2021-10-30

## 2021-10-20 RX ORDER — FLUTICASONE PROPIONATE 50 MCG
2 SPRAY, SUSPENSION (ML) NASAL DAILY
Qty: 16 G | Refills: 5 | Status: SHIPPED | OUTPATIENT
Start: 2021-10-20

## 2021-10-20 SDOH — ECONOMIC STABILITY: FOOD INSECURITY: WITHIN THE PAST 12 MONTHS, YOU WORRIED THAT YOUR FOOD WOULD RUN OUT BEFORE YOU GOT MONEY TO BUY MORE.: NEVER TRUE

## 2021-10-20 SDOH — ECONOMIC STABILITY: FOOD INSECURITY: WITHIN THE PAST 12 MONTHS, THE FOOD YOU BOUGHT JUST DIDN'T LAST AND YOU DIDN'T HAVE MONEY TO GET MORE.: NEVER TRUE

## 2021-10-20 ASSESSMENT — SOCIAL DETERMINANTS OF HEALTH (SDOH): HOW HARD IS IT FOR YOU TO PAY FOR THE VERY BASICS LIKE FOOD, HOUSING, MEDICAL CARE, AND HEATING?: NOT HARD AT ALL

## 2021-10-20 NOTE — PROGRESS NOTES
[address] [facility] Note    Date: 10/20/2021      Assessment/Plan:   Right hip pain-concerning for osteoarthritis. We will have him get an x-ray and pending that result will follow up with Ortho. To use Voltaren gel and lidocaine patches as needed. History of GI bleed so will not prescribe NSAID    Having some mucus concerning for GERD/seasonal allergies. He is to start Flonase and continue Zyrtec. Also okay to take Prevacid daily instead of every other day for 1 month to see if having improvement, and then wean back. Hypertension controlled labs today  Diabetes screen  Flu vaccine  To get COVID booster and shingles vaccine    1. Right hip pain  -     Ryan Parisi MD, Orthopedic Surgery, [facility]  -     XR HIP RIGHT (2-3 VIEWS); Future  2. Gastroesophageal reflux disease without esophagitis  -     lansoprazole (PREVACID) 30 MG delayed release capsule; TAKE 1 CAPSULE BY MOUTH EVERY OTHER DAY, Disp-90 capsule, R-1Normal  3. Seasonal allergies  4. Essential hypertension, benign  -     Comprehensive Metabolic Panel; Future  -     HEMOGLOBIN A1C; Future  5. Screening for diabetes mellitus  -     HEMOGLOBIN A1C; Future  6.  Need for vaccination  -     INFLUENZA, QUADV, ADJUVANTED, 72 YRS =, IM, PF, PREFILL SYR, 0.5ML (FLUAD)      Orders Placed This Encounter   Procedures    XR HIP RIGHT (2-3 VIEWS)    INFLUENZA, QUADV, ADJUVANTED, 65 YRS =, IM, PF, PREFILL SYR, 0.5ML (FLUAD)    Comprehensive Metabolic Panel    HEMOGLOBIN A1C    Luisa - Irma Ellsworth MD, Orthopedic Surgery, [facility]     Orders Placed This Encounter   Medications    fluticasone (FLONASE) 50 MCG/ACT nasal spray     Si sprays by Each Nostril route daily     Dispense:  16 g     Refill:  5    lansoprazole (PREVACID) 30 MG delayed release capsule     Sig: TAKE 1 CAPSULE BY MOUTH EVERY OTHER DAY     Dispense:  90 capsule     Refill:  1    diclofenac sodium (VOLTAREN) 1 % GEL     Sig: Apply 4 g topically 4 times daily as needed for Pain     Dispense:  100 g     Refill:  1    lidocaine (LIDODERM) 5 %     Sig: Place 1 patch onto the skin daily for 10 days 12 hours on, 12 hours off. Dispense:  10 patch     Refill:  0       Return if symptoms worsen or fail to improve. Discussed medication(s) risks, benefits, side effects, adverse reactions and interactions with patient. Patient voiced understanding. Subjective/Objective:     Chief Complaint   Patient presents with    Hip Pain       HPI     Right hip pain for 3 weeks  Having right hip joint pain when he bears weight. It has gotten better the past few days. He went to the [facility] and was sleeping on a firm mattress, afterwards noted some pain in his hip. Then he was cutting down some trees and it caused the pain to flareup. He has not been playing golf the past few days and the pain has gotten a little bit better. He has a history of a GI bleed. He does take some Tylenol as needed. He is due for the Covid booster. He has also been having some congestion where in the mornings or after he eats he will spit up some mucus. He is on Zyrtec for allergies. He also takes Prevacid 15 mg. Wt Readings from Last 3 Encounters:   10/20/21 261 lb (118.4 kg)   10/18/21 262 lb (118.8 kg)   04/26/21 262 lb 9.6 oz (119.1 kg)     Body mass index is 36.4 kg/m².     BP Readings from Last 3 Encounters:   10/20/21 122/80   10/18/21 126/74   04/26/21 134/84     The 10-year ASCVD risk score (Maureen Messer, et al., 2013) is: 17.7%    Values used to calculate the score:      Age: 69 years      Sex: Male      Is Non- : No      Diabetic: No      Tobacco smoker: No      Systolic Blood Pressure: 592 mmHg      Is BP treated: Yes      HDL Cholesterol: 47 mg/dL      Total Cholesterol: 134 mg/dL    ROS: denies nausea/vomiting, fevers, chills, chest pain, shortness of breath, diarrhea, constipation, blood in the urine or stool         Patient Active Problem List   Diagnosis    HLD (hyperlipidemia)    Essential hypertension, benign    Coronary Artery Disease    Peyronie's disease    Congestion    Acute rhinosinusitis    Seasonal allergies    Hamstring tightness of both lower extremities    Obesity (BMI 30.0-34. 9)    Chondromalacia patellae of right knee    Chronic pain of right knee    H/O spleen injury: Spleenectomy 1957    S/P right knee arthroscopy, chondroplasty, synovectomy and partial medial meniscectomy 11/04/2016    Hyperlipidemia     Past Medical History:   Diagnosis Date    ASHD (arteriosclerotic heart disease)     Cancer ([facility] [address]     skin    Cold sore 10/2016    Colon polyp     COPD (chronic obstructive pulmonary disease) ([facility] [address] 2006    NO ISSUES IN LAST 10 YEARS- QUIT SMOKING    History of blood transfusion     Hyperlipidemia     Hypertension     Kidney stone     Medial meniscus tear     RIGHT    MI (myocardial infarction) ([facility] [address] 01/2000    Obesity (BMI 30.0-34.9) 9/22/2016    Peyronie's disease 7/28/2011    PONV (postoperative nausea and vomiting)     Psoriasis     PUD (peptic ulcer disease)     Sinus infection 10/2016    Transfusion of, blood     pt has a history of receiving blood or blood product transfusion(s)       Past Surgical History:   Procedure Laterality Date    ANGIOPLASTY  2000    STENT X1    COLONOSCOPY      several    COLONOSCOPY  1/15/2020    COLONOSCOPY POLYPECTOMY SNARE/COLD BIOPSY performed by Brittani Arora MD at [address]  3/03    ureteral stone removed    KNEE ARTHROSCOPY  11/04/2016    right knee [facility]    OTHER SURGICAL HISTORY  11/04/2016    Right knee surgery repair    PTCA  1990    stent    SKIN CANCER EXCISION      on face  on nose and cheek    SPLENECTOMY  1955    UPPER GASTROINTESTINAL ENDOSCOPY         Current Outpatient Medications   Medication Sig Dispense Refill    fluticasone (FLONASE) 50 MCG/ACT nasal spray 2 sprays by Each Nostril route daily 16 g 5    lansoprazole (PREVACID) 30 MG delayed release capsule TAKE 1 CAPSULE BY MOUTH EVERY OTHER DAY 90 capsule 1    diclofenac sodium (VOLTAREN) 1 % GEL Apply 4 g topically 4 times daily as needed for Pain 100 g 1    lidocaine (LIDODERM) 5 % Place 1 patch onto the skin daily for 10 days 12 hours on, 12 hours off. 10 patch 0    atenolol (TENORMIN) 100 MG tablet Take 1 tablet by mouth daily 90 tablet 3    lisinopril (PRINIVIL;ZESTRIL) 40 MG tablet Take 1 tablet by mouth daily 90 tablet 3    hydroCHLOROthiazide (HYDRODIURIL) 25 MG tablet Take 0.5 tablets by mouth every morning 90 tablet 3    atorvastatin (LIPITOR) 80 MG tablet Take 0.5 tablets by mouth daily 45 tablet 3    Coenzyme Q10 400 MG CAPS Take by mouth      cetirizine (ZYRTEC) 10 MG tablet Take 10 mg by mouth every evening       therapeutic multivitamin-minerals (THERAGRAN-M) tablet Take 1 tablet by mouth daily.  aspirin 81 MG EC tablet Take 81 mg by mouth daily. No current facility-administered medications for this visit. Allergies   Allergen Reactions    Adhesive Tape Other (See Comments)     \"skin turns red\"       Social History     Socioeconomic History    Marital status:      Spouse name: Kate Rogers Number of children: 3    Years of education: None    Highest education level: None   Occupational History    None   Tobacco Use    Smoking status: Former Smoker     Packs/day: 1.00     Years: 45.00     Pack years: 45.00     Quit date: 4/15/2009     Years since quittin.5    Smokeless tobacco: Never Used    Tobacco comment: quit 2009. Previously smoked <1/2PPD   Vaping Use    Vaping Use: Never used   Substance and Sexual Activity    Alcohol use:  Yes     Alcohol/week: 2.0 standard drinks     Types: 2 Glasses of wine per week     Comment: minimal    Drug use: No    Sexual activity: None     Comment:    Other Topics Concern    None   Social History Narrative    None     Social Determinants of Health     Financial Resource Strain: Low Risk     Difficulty of Paying Living Expenses: Not hard at all   Food Insecurity: No Food Insecurity    Worried About Running Out of Food in the Last Year: Never true    Heena of Food in the Last Year: Never true   Transportation Needs:     Lack of Transportation (Medical):      Lack of Transportation (Non-Medical):    Physical Activity:     Days of Exercise per Week:     Minutes of Exercise per Session:    Stress:     Feeling of Stress :    Social Connections:     Frequency of Communication with Friends and Family:     Frequency of Social Gatherings with Friends and Family:     Attends [employer] Services:     Active Member of Clubs or Organizations:     Attends Club or Organization Meetings:     Marital Status:    Intimate Partner Violence:     Fear of Current or Ex-Partner:     Emotionally Abused:     Physically Abused:     Sexually Abused:      Family History   Problem Relation Age of Onset    Cancer Father         bladder    Hypertension Father     Heart Disease Father     High Blood Pressure Father     High Cholesterol Father     Heart Disease Mother     High Blood Pressure Mother     High Cholesterol Mother     Heart Attack Mother          Vitals:  /80   Pulse 52   Ht 5' 11\" (1.803 m)   Wt 261 lb (118.4 kg)   SpO2 95%   BMI 36.40 kg/m²     Physical Exam   General:  Well-appearing, no acute distress, alert, non-toxic  HEENT:  Normocephalic, atraumatic, without lymphadenopathy, EOMI, neck supple  Cardiovascular: normal heart rate, normal rhythm, no murmurs, rubs or gallops  Respiratory: normal breath sounds, good air movement, no respiratory distress, no wheezing, rales or rhonchi  GI: bowel sounds normal, soft, non-distended, no tenderness, no masses or peritoneal signs  Extremities: intact distal pulses, warm, dry, well perfused, without clubbing, cyanosis or edema, normal movement of all extremities. No joint swelling, deformity or tenderness. Skin:  No rash, warm and dry  PSYCH:  alert and oriented x 3; normal affect  NEURO:  CN2-12 grossly intact, normal motor function, normal sensory function, normal speech, no gross focal deficits noted, gait within normal  MSK:  No pain with palpation of greater trochanter bursa, Right hip pain with internal and external rotation, mild pain with abduction and adduction, 5/5 strength with hip flexion/abduction/adduction, able to ambulate but does have pain. Give \"C sign\" for pain of hip. Kenton Prater MD    10/20/2021 10:36 AM    Documentation was done using voice recognition dragon software. Every effort was made to ensure accuracy; however, inadvertent, unintentional computerized transcription errors may be present.

## 2021-10-20 NOTE — PATIENT INSTRUCTIONS
To get hip xray done  Call Eliot-MARIANNE ([phone]) to schedule    Tylenol as needed  voltaren gel as needed  Lidocaine patches as needed      We will let you know about results    Ortho referral placed, to see after xray    To get COVID booster and shingles vaccine    To start flonase  Take prevacid daily for 1 month

## 2021-10-21 LAB
ESTIMATED AVERAGE GLUCOSE: 128.4 MG/DL
HBA1C MFR BLD: 6.1 %

## 2021-10-22 DIAGNOSIS — R73.03 PRE-DIABETES: ICD-10-CM

## 2021-10-22 DIAGNOSIS — M25.551 RIGHT HIP PAIN: Primary | ICD-10-CM

## 2021-10-22 RX ORDER — METHYLPREDNISOLONE 4 MG/1
TABLET ORAL
Qty: 1 KIT | Refills: 0 | Status: SHIPPED | OUTPATIENT
Start: 2021-10-22 | End: 2021-10-28

## 2021-11-18 ENCOUNTER — OFFICE VISIT (OUTPATIENT)
Dept: ORTHOPEDIC SURGERY | Age: 71
End: 2021-11-18
Payer: MEDICARE

## 2021-11-18 VITALS — WEIGHT: 261 LBS | HEIGHT: 71 IN | BODY MASS INDEX: 36.54 KG/M2

## 2021-11-18 DIAGNOSIS — M70.61 TROCHANTERIC BURSITIS OF RIGHT HIP: Primary | ICD-10-CM

## 2021-11-18 PROCEDURE — 99203 OFFICE O/P NEW LOW 30 MIN: CPT | Performed by: ORTHOPAEDIC SURGERY

## 2021-11-18 NOTE — PROGRESS NOTES
CHIEF COMPLAINT: Right hip pain. History:   Carlton Fink is a 66 y.o. male who presents today for evaluation and treatment of right hip pain / injury. Patient was referred by Excell Osler, MD. This is evaluated as a personal injury. He states that the pain began 7  weeks ago. Patient rates the pain as a 6/10. Patient did not have a history of injury. He does think it started after he was on vacation in [place] [place] [facility] and went bicycling. He does not normally cycle. He also had a hard mattress while there. And then right after he got back he was climbing up and down on a ladder cutting tree limbs. Patient does not have a history of back pain. Patient has not had PT. The patient cannot take NSAIDs because of history of ulcers. He has tried Medrol Dosepak without relief. He has not use any ice or heat. He has tried Voltaren gel without relief. The patient has not had an intra-articular hip injection. He is retired. Outside reports reviewed: none.       Past Medical History:   Diagnosis Date    ASHD (arteriosclerotic heart disease)     Cancer ([facility] [address]     skin    Cold sore 10/2016    Colon polyp     COPD (chronic obstructive pulmonary disease) ([facility] [address] 2006    NO ISSUES IN LAST 10 YEARS- QUIT SMOKING    History of blood transfusion     Hyperlipidemia     Hypertension     Kidney stone     Medial meniscus tear     RIGHT    MI (myocardial infarction) ([facility] [address] 01/2000    Obesity (BMI 30.0-34.9) 9/22/2016    Peyronie's disease 7/28/2011    PONV (postoperative nausea and vomiting)     Psoriasis     PUD (peptic ulcer disease)     Sinus infection 10/2016    Transfusion of, blood     pt has a history of receiving blood or blood product transfusion(s)       Past Surgical History:   Procedure Laterality Date    ANGIOPLASTY  2000    STENT X1    COLONOSCOPY      several    COLONOSCOPY  1/15/2020    COLONOSCOPY POLYPECTOMY SNARE/COLD BIOPSY performed by Srinivasan Vaughan MD at [facility] KIDNEY STONE SURGERY  3/03    ureteral stone removed    KNEE ARTHROSCOPY  2016    right knee heladio sweeney    OTHER SURGICAL HISTORY  2016    Right knee surgery repair    PTCA  1990    stent    SKIN CANCER EXCISION      on face  on nose and cheek    SPLENECTOMY  195    UPPER GASTROINTESTINAL ENDOSCOPY         Family History   Problem Relation Age of Onset    Cancer Father         bladder    Hypertension Father     Heart Disease Father     High Blood Pressure Father     High Cholesterol Father     Heart Disease Mother     High Blood Pressure Mother     High Cholesterol Mother     Heart Attack Mother        Social History     Socioeconomic History    Marital status:      Spouse name: Renetta Henderson Number of children: 1    Years of education: Not on file    Highest education level: Not on file   Occupational History    Not on file   Tobacco Use    Smoking status: Former Smoker     Packs/day: 1.00     Years: 45.00     Pack years: 45.00     Quit date: 4/15/2009     Years since quittin.6    Smokeless tobacco: Never Used    Tobacco comment: quit 2009. Previously smoked <1/2PPD   Vaping Use    Vaping Use: Never used   Substance and Sexual Activity    Alcohol use: Yes     Alcohol/week: 2.0 standard drinks     Types: 2 Glasses of wine per week     Comment: minimal    Drug use: No    Sexual activity: Not on file     Comment:    Other Topics Concern    Not on file   Social History Narrative    Not on file     Social Determinants of Health     Financial Resource Strain: Low Risk     Difficulty of Paying Living Expenses: Not hard at all   Food Insecurity: No Food Insecurity    Worried About Running Out of Food in the Last Year: Never true    [address] in the Last Year: Never true   Transportation Needs:     Lack of Transportation (Medical): Not on file    Lack of Transportation (Non-Medical):  Not on file   Physical Activity:     Days of Exercise per Week: Not on file    Minutes of Exercise per Session: Not on file   Stress:     Feeling of Stress : Not on file   Social Connections:     Frequency of Communication with Friends and Family: Not on file    Frequency of Social Gatherings with Friends and Family: Not on file    Attends Taoist Services: Not on file    Active Member of [address] [place] [address] or Organizations: Not on file    Attends Club or Organization Meetings: Not on file    Marital Status: Not on file   Intimate Partner Violence:     Fear of Current or Ex-Partner: Not on file    Emotionally Abused: Not on file    Physically Abused: Not on file    Sexually Abused: Not on file   Housing Stability:     Unable to Pay for Housing in the Last Year: Not on file    Number of [place] in the Last Year: Not on file    Unstable Housing in the Last Year: Not on file       Current Outpatient Medications   Medication Sig Dispense Refill    fluticasone (FLONASE) 50 MCG/ACT nasal spray 2 sprays by Each Nostril route daily 16 g 5    lansoprazole (PREVACID) 30 MG delayed release capsule TAKE 1 CAPSULE BY MOUTH EVERY OTHER DAY 90 capsule 1    diclofenac sodium (VOLTAREN) 1 % GEL Apply 4 g topically 4 times daily as needed for Pain 100 g 1    atenolol (TENORMIN) 100 MG tablet Take 1 tablet by mouth daily 90 tablet 3    lisinopril (PRINIVIL;ZESTRIL) 40 MG tablet Take 1 tablet by mouth daily 90 tablet 3    hydroCHLOROthiazide (HYDRODIURIL) 25 MG tablet Take 0.5 tablets by mouth every morning 90 tablet 3    atorvastatin (LIPITOR) 80 MG tablet Take 0.5 tablets by mouth daily 45 tablet 3    Coenzyme Q10 400 MG CAPS Take by mouth      cetirizine (ZYRTEC) 10 MG tablet Take 10 mg by mouth every evening       therapeutic multivitamin-minerals (THERAGRAN-M) tablet Take 1 tablet by mouth daily.  aspirin 81 MG EC tablet Take 81 mg by mouth daily. No current facility-administered medications for this visit.        Allergies   Allergen Reactions    Adhesive Tape Other (See Comments)     \"skin turns red\"        Review of Systems:  I have reviewed the clinically relevant past medical history, medications, allergies, family history, social history, and 13 point Review of Systems from the patient's recent history form & documented any details relevant to today's presenting complaints in the history above. The patient's self-reported past medical history, medications, allergies, family history, social history, and Review of Systems form from 11/18/21 have been scanned into the chart under the \"Media\" tab. Physical Examination:     Vital signs:   Ht 5' 11\" (1.803 m)   Wt 261 lb (118.4 kg)   BMI 36.40 kg/m²     General:  alert, appears stated age, cooperative and no distress   Feet:  normal   Gait:  Normal. The patient can bear weight on the injured extremity. Right Hip  Trendelenburg test:  level on Right   negative on Left      Passive ROM:  0 degrees extension    90 degrees flexion   45 degrees external rotation   15 degrees internal rotation   Bilateral hips   ROSA:  2 1/2   Impingement test:  negative   Left hip: negative   Labral stress test:  negative   Left hip: negative   Nehemiah test:  positive bilateral    Greater trochanter tenderness:  mildly positive   Pirifiormis / Gluteus medius tenderness:  mildly positive   Iliopsoas strength:  5 / 5    Bilateral hips   Logroll:  negative   Straight-leg raise:  negative   Leg length discrepancy:  Equal      Motor exam noted and recorded, quadriceps, hamstrings, foot dorsiflexors and plantar flexors are intact 5/5 equal and symmetric. Sensation is intact grossly to light touch in the tibial, peroneal, sural, and saphenous nerve distributions bilaterally. Both feet are well perfused and sensory is intact to feet equal and symmetric. There is not  any cellulitis, skin lesions, or lymphedema noted bilaterally. Imaging:  Right hip xrays:   AP pelvis xray and AP and lateral views obtained and reviewed. They demonstrate no evidence of acute fracture, subluxation, or dislocation. Maintained joint spaces      Assessment:     Right hip Trochanteric bursitis  Class II obesity      Plan:     Natural history and expected course discussed. Questions answered. Ice as needed. Continue Voltaren gel as needed. Discussed corticosteroid injection. He deferred at this time. Recommended physical therapy. He deferred at this time. We provided him a handout for home exercise program.    He asked if he could resume working out at [employer]. Discussed that he could, but this could also aggravate his symptoms. However, it would not do further damage. Follow-up as needed. Rox Dawn. Michaelene Scheuermann, MD  Orthopaedic Surgery and Sports Medicine     Disclaimer: This note was generated with use of a verbal recognition program and an attempt was made to check for errors. It is possible that there are still dictated errors within this office note. If so, please bring any significant errors to my attention for an addendum. All efforts were made to ensure that this office note is accurate.

## 2022-05-03 ENCOUNTER — TELEPHONE (OUTPATIENT)
Dept: CASE MANAGEMENT | Age: 72
End: 2022-05-03

## 2022-05-19 ENCOUNTER — TELEPHONE (OUTPATIENT)
Dept: FAMILY MEDICINE CLINIC | Age: 72
End: 2022-05-19

## 2022-05-19 DIAGNOSIS — Z87.891 PERSONAL HISTORY OF TOBACCO USE: Primary | ICD-10-CM

## 2022-05-19 NOTE — TELEPHONE ENCOUNTER
Patient called to malachi. -CT lung screening  Kyara from [facility]. is requesting a new order for CT lung screen  Order in [facility]

## 2022-05-23 ENCOUNTER — HOSPITAL ENCOUNTER (OUTPATIENT)
Dept: CT IMAGING | Age: 72
Discharge: HOME | End: 2022-05-23
Payer: MEDICARE

## 2022-05-23 DIAGNOSIS — Z87.891 PERSONAL HISTORY OF TOBACCO USE: ICD-10-CM

## 2022-05-23 PROCEDURE — 71271 CT THORAX LUNG CANCER SCR C-: CPT

## 2022-07-29 DIAGNOSIS — K21.9 GASTROESOPHAGEAL REFLUX DISEASE WITHOUT ESOPHAGITIS: ICD-10-CM

## 2022-07-29 RX ORDER — LANSOPRAZOLE 30 MG/1
CAPSULE, DELAYED RELEASE ORAL
Qty: 90 CAPSULE | Refills: 0 | Status: SHIPPED | OUTPATIENT
Start: 2022-07-29

## 2022-07-29 NOTE — TELEPHONE ENCOUNTER
Medication:   Requested Prescriptions     Pending Prescriptions Disp Refills    lansoprazole (PREVACID) 30 MG delayed release capsule [Pharmacy Med Name: Lansoprazole Oral Capsule Delayed Release 30 MG] 90 capsule 0     Sig: TAKE 1 CAPSULE BY MOUTH EVERY OTHER DAY        Last Filled:  10/20/2021    Patient Phone Number: [phone] (home)     Last appt: 10/20/2021   Next appt: Visit date not found    Last OARRS:   RX Monitoring 10/12/2016   Attestation The Prescription Monitoring Report for this patient was reviewed today.

## 2022-10-26 DIAGNOSIS — I10 ESSENTIAL HYPERTENSION: ICD-10-CM

## 2022-10-26 DIAGNOSIS — E78.2 MIXED HYPERLIPIDEMIA: ICD-10-CM

## 2022-10-27 DIAGNOSIS — E78.2 MIXED HYPERLIPIDEMIA: ICD-10-CM

## 2022-10-27 DIAGNOSIS — I10 ESSENTIAL HYPERTENSION: ICD-10-CM

## 2022-10-27 RX ORDER — HYDROCHLOROTHIAZIDE 25 MG/1
TABLET ORAL
Qty: 90 TABLET | Refills: 0 | Status: SHIPPED | OUTPATIENT
Start: 2022-10-27

## 2022-10-27 RX ORDER — ATENOLOL 100 MG/1
100 TABLET ORAL DAILY
Qty: 90 TABLET | Refills: 3 | Status: SHIPPED | OUTPATIENT
Start: 2022-10-27

## 2022-10-27 RX ORDER — ATENOLOL 100 MG/1
TABLET ORAL
Qty: 90 TABLET | Refills: 0 | OUTPATIENT
Start: 2022-10-27

## 2022-10-27 RX ORDER — ATORVASTATIN CALCIUM 80 MG/1
TABLET, FILM COATED ORAL
Qty: 45 TABLET | Refills: 0 | OUTPATIENT
Start: 2022-10-27

## 2022-10-27 RX ORDER — LISINOPRIL 40 MG/1
TABLET ORAL
Qty: 90 TABLET | Refills: 0 | Status: SHIPPED | OUTPATIENT
Start: 2022-10-27

## 2022-10-27 RX ORDER — ATORVASTATIN CALCIUM 80 MG/1
40 TABLET, FILM COATED ORAL DAILY
Qty: 45 TABLET | Refills: 3 | Status: SHIPPED | OUTPATIENT
Start: 2022-10-27

## 2022-10-27 NOTE — TELEPHONE ENCOUNTER
Last ov: 10/18/2021 DANNY    Last Refill: 10/18/2021    Next appointment: 10/18/2022 DANNY    (RECALL)

## 2022-11-03 ENCOUNTER — TELEPHONE (OUTPATIENT)
Dept: CARDIOLOGY CLINIC | Age: 72
End: 2022-11-03

## 2022-11-03 NOTE — TELEPHONE ENCOUNTER
LMOM for patient to return call in regards to year office visit with DKW when patient calls back please offer Nov 8 2022 1:00 pm at [facility] location.

## 2022-11-04 SDOH — HEALTH STABILITY: PHYSICAL HEALTH: ON AVERAGE, HOW MANY MINUTES DO YOU ENGAGE IN EXERCISE AT THIS LEVEL?: 30 MIN

## 2022-11-04 SDOH — HEALTH STABILITY: PHYSICAL HEALTH: ON AVERAGE, HOW MANY DAYS PER WEEK DO YOU ENGAGE IN MODERATE TO STRENUOUS EXERCISE (LIKE A BRISK WALK)?: 3 DAYS

## 2022-11-04 ASSESSMENT — PATIENT HEALTH QUESTIONNAIRE - PHQ9
SUM OF ALL RESPONSES TO PHQ QUESTIONS 1-9: 0
SUM OF ALL RESPONSES TO PHQ9 QUESTIONS 1 & 2: 0
2. FEELING DOWN, DEPRESSED OR HOPELESS: 0
SUM OF ALL RESPONSES TO PHQ QUESTIONS 1-9: 0
1. LITTLE INTEREST OR PLEASURE IN DOING THINGS: 0
SUM OF ALL RESPONSES TO PHQ QUESTIONS 1-9: 0
SUM OF ALL RESPONSES TO PHQ QUESTIONS 1-9: 0

## 2022-11-04 ASSESSMENT — LIFESTYLE VARIABLES
HOW OFTEN DO YOU HAVE A DRINK CONTAINING ALCOHOL: 2-4 TIMES A MONTH
HOW MANY STANDARD DRINKS CONTAINING ALCOHOL DO YOU HAVE ON A TYPICAL DAY: 1
HOW OFTEN DO YOU HAVE SIX OR MORE DRINKS ON ONE OCCASION: 1
HOW MANY STANDARD DRINKS CONTAINING ALCOHOL DO YOU HAVE ON A TYPICAL DAY: 1 OR 2
HOW OFTEN DO YOU HAVE A DRINK CONTAINING ALCOHOL: 3

## 2022-11-07 ASSESSMENT — ENCOUNTER SYMPTOMS
SHORTNESS OF BREATH: 0
COUGH: 0
CHEST TIGHTNESS: 0
NAUSEA: 0
ABDOMINAL DISTENTION: 0
BLOOD IN STOOL: 0
ABDOMINAL PAIN: 0
PHOTOPHOBIA: 0

## 2022-11-08 ENCOUNTER — OFFICE VISIT (OUTPATIENT)
Dept: CARDIOLOGY CLINIC | Age: 72
End: 2022-11-08
Payer: MEDICARE

## 2022-11-08 VITALS
HEART RATE: 72 BPM | SYSTOLIC BLOOD PRESSURE: 126 MMHG | BODY MASS INDEX: 36.67 KG/M2 | WEIGHT: 261.9 LBS | DIASTOLIC BLOOD PRESSURE: 74 MMHG | HEIGHT: 71 IN | OXYGEN SATURATION: 98 %

## 2022-11-08 DIAGNOSIS — E78.49 OTHER HYPERLIPIDEMIA: Primary | ICD-10-CM

## 2022-11-08 DIAGNOSIS — E78.2 MIXED HYPERLIPIDEMIA: ICD-10-CM

## 2022-11-08 DIAGNOSIS — E66.01 SEVERE OBESITY (BMI 35.0-39.9) WITH COMORBIDITY (HCC): ICD-10-CM

## 2022-11-08 DIAGNOSIS — R06.83 SNORES: ICD-10-CM

## 2022-11-08 DIAGNOSIS — R53.83 OTHER FATIGUE: ICD-10-CM

## 2022-11-08 PROCEDURE — 99214 OFFICE O/P EST MOD 30 MIN: CPT | Performed by: INTERNAL MEDICINE

## 2022-11-08 PROCEDURE — 1123F ACP DISCUSS/DSCN MKR DOCD: CPT | Performed by: INTERNAL MEDICINE

## 2022-11-08 PROCEDURE — 3078F DIAST BP <80 MM HG: CPT | Performed by: INTERNAL MEDICINE

## 2022-11-08 PROCEDURE — 3074F SYST BP LT 130 MM HG: CPT | Performed by: INTERNAL MEDICINE

## 2022-11-08 NOTE — LETTER
[facility]  [address] [address]  Phone: [phone]  Fax: [phone]    Raymundo Perez DO    November 12, 2022     Nila Aguilar MD  [address] [place] [address]    Patient: Jeannie Kinsey   MR Number: [id number]   YOB: 1950   Date of Visit: 11/8/2022       Dear Nila Aguilar: Thank you for referring Roselyn Dobson to me for evaluation/treatment. Below are the relevant portions of my assessment and plan of care. If you have questions, please do not hesitate to call me. I look forward to following Usha Orozco along with you.     Sincerely,      Raymundo Perez DO

## 2022-11-09 ENCOUNTER — OFFICE VISIT (OUTPATIENT)
Dept: FAMILY MEDICINE CLINIC | Age: 72
End: 2022-11-09
Payer: MEDICARE

## 2022-11-09 VITALS
HEIGHT: 71 IN | DIASTOLIC BLOOD PRESSURE: 80 MMHG | OXYGEN SATURATION: 98 % | WEIGHT: 263 LBS | SYSTOLIC BLOOD PRESSURE: 138 MMHG | BODY MASS INDEX: 36.82 KG/M2 | HEART RATE: 87 BPM

## 2022-11-09 DIAGNOSIS — I25.118 ATHEROSCLEROSIS OF NATIVE CORONARY ARTERY OF NATIVE HEART WITH STABLE ANGINA PECTORIS (HCC): ICD-10-CM

## 2022-11-09 DIAGNOSIS — Z12.5 PROSTATE CANCER SCREENING: ICD-10-CM

## 2022-11-09 DIAGNOSIS — R73.03 PRE-DIABETES: Primary | ICD-10-CM

## 2022-11-09 DIAGNOSIS — I10 PRIMARY HYPERTENSION: ICD-10-CM

## 2022-11-09 DIAGNOSIS — R53.83 FATIGUE, UNSPECIFIED TYPE: ICD-10-CM

## 2022-11-09 DIAGNOSIS — Z00.00 MEDICARE ANNUAL WELLNESS VISIT, SUBSEQUENT: ICD-10-CM

## 2022-11-09 LAB — HBA1C MFR BLD: 6.2 %

## 2022-11-09 PROCEDURE — 1123F ACP DISCUSS/DSCN MKR DOCD: CPT | Performed by: FAMILY MEDICINE

## 2022-11-09 PROCEDURE — 3074F SYST BP LT 130 MM HG: CPT | Performed by: FAMILY MEDICINE

## 2022-11-09 PROCEDURE — G0439 PPPS, SUBSEQ VISIT: HCPCS | Performed by: FAMILY MEDICINE

## 2022-11-09 PROCEDURE — 83036 HEMOGLOBIN GLYCOSYLATED A1C: CPT | Performed by: FAMILY MEDICINE

## 2022-11-09 PROCEDURE — 3078F DIAST BP <80 MM HG: CPT | Performed by: FAMILY MEDICINE

## 2022-11-09 SDOH — ECONOMIC STABILITY: FOOD INSECURITY: WITHIN THE PAST 12 MONTHS, YOU WORRIED THAT YOUR FOOD WOULD RUN OUT BEFORE YOU GOT MONEY TO BUY MORE.: NEVER TRUE

## 2022-11-09 SDOH — ECONOMIC STABILITY: FOOD INSECURITY: WITHIN THE PAST 12 MONTHS, THE FOOD YOU BOUGHT JUST DIDN'T LAST AND YOU DIDN'T HAVE MONEY TO GET MORE.: NEVER TRUE

## 2022-11-09 ASSESSMENT — SOCIAL DETERMINANTS OF HEALTH (SDOH): HOW HARD IS IT FOR YOU TO PAY FOR THE VERY BASICS LIKE FOOD, HOUSING, MEDICAL CARE, AND HEATING?: NOT HARD AT ALL

## 2022-11-09 NOTE — PROGRESS NOTES
Medicare Annual Wellness Visit    Linda Esteban is here for Medicare AWV    Assessment & Plan   Pre-diabetes  -     POCT glycosylated hemoglobin (Hb A1C)  Atherosclerosis of native coronary artery of native heart with stable angina pectoris ([address]  Medicare annual wellness visit, subsequent    Recommendations for Preventive Services Due: see orders and patient instructions/AVS.  Recommended screening schedule for the next 5-10 years is provided to the patient in written form: see Patient Instructions/AVS.     Return for Medicare Annual Wellness Visit in 1 year. Subjective   The following acute and/or chronic problems were also addressed today:  ? Dec hearing  Had flu and covid booster yesterday    Patient's complete Health Risk Assessment and screening values have been reviewed and are found in Flowsheets. The following problems were reviewed today and where indicated follow up appointments were made and/or referrals ordered.     Positive Risk Factor Screenings with Interventions:    Fall Risk:  Do you feel unsteady or are you worried about falling? : (!) yes  2 or more falls in past year?: (!) yes  Fall with injury in past year?: no   Fall Risk Interventions:    Home safety tips provided            General Health and ACP:  General  In general, how would you say your health is?: Good  In the past 7 days, have you experienced any of the following: New or Increased Pain, New or Increased Fatigue, Loneliness, Social Isolation, Stress or Anger?: No  Do you get the social and emotional support that you need?: Yes  Do you have a Living Will?: Yes    Advance Directives       Power of  Living Will ACP-Advance Directive ACP-Power of     Not on File Not on File Not on File Not on File          General Health Risk Interventions:  Fatigue: regular exercise recommended- 3-5 times per week, 30-45 minutes per session    Health Habits/Nutrition:  Physical Activity: Insufficiently Active    Days of Exercise per Week: 3 days    Minutes of Exercise per Session: 30 min     Have you lost any weight without trying in the past 3 months?: No  Body mass index: (!) 36.68  Have you seen the dentist within the past year?: Yes  Health Habits/Nutrition Interventions:  Inadequate physical activity:  patient agrees to exercise for at least 150 minutes/week    Hearing/Vision:  Do you or your family notice any trouble with your hearing that hasn't been managed with hearing aids?: (!) Yes  Do you have difficulty driving, watching TV, or doing any of your daily activities because of your eyesight?: No  Have you had an eye exam within the past year?: Yes  No results found. Hearing/Vision Interventions:  Vision concerns:  patient encouraged to make appointment with his/her eye specialist    Safety:  Do you have working smoke detectors?: Yes  Do you have any tripping hazards - loose or unsecured carpets or rugs?: (!) Yes  Do you have any tripping hazards - clutter in doorways, halls, or stairs?: No  Do you have either shower bars, grab bars, non-slip mats or non-slip surfaces in your shower or bathtub?: Yes  Do all of your stairways have a railing or banister?: Yes  Do you always fasten your seatbelt when you are in a car?: Yes  Safety Interventions:  Home safety tips provided           Objective   Vitals:    11/09/22 1058   BP: 138/80   Pulse: 87   SpO2: 98%   Weight: 263 lb (119.3 kg)   Height: 5' 11\" (1.803 m)      Body mass index is 36.68 kg/m².       General Appearance: alert and oriented to person, place and time, well-developed and well-nourished, in no acute distress and obese  Head: normocephalic and atraumatic  Eyes: pupils equal, round, and reactive to light, extraocular eye movements intact, conjunctivae normal  ENT: tympanic membrane, external ear and ear canal normal bilaterally, oropharynx clear and moist with normal mucous membranes  Neck: neck supple and non tender without mass, no thyromegaly or thyroid nodules, no cervical lymphadenopathy   Pulmonary/Chest: clear to auscultation bilaterally- no wheezes, rales or rhonchi, normal air movement, no respiratory distress  Cardiovascular: normal rate, normal S1 and S2, no gallops, intact distal pulses, and no carotid bruits  Abdomen: soft, non-tender, non-distended, normal bowel sounds, no masses or organomegaly, large left uq inscion from remote splenectomy  Extremities: no cyanosis and no clubbing       Allergies   Allergen Reactions    Adhesive Tape Other (See Comments)     \"skin turns red\"     Prior to Visit Medications    Medication Sig Taking? Authorizing Provider   lisinopril (PRINIVIL;ZESTRIL) 40 MG tablet TAKE 1 TABLET BY MOUTH EVERY DAY Yes Maddison Celeste DO   hydroCHLOROthiazide (HYDRODIURIL) 25 MG tablet TAKE 1/2 TABLET BY MOUTH IN THE MORNING Yes Maddison Celeste DO   atenolol (TENORMIN) 100 MG tablet Take 1 tablet by mouth daily Yes Maddison Celeste DO   atorvastatin (LIPITOR) 80 MG tablet Take 0.5 tablets by mouth daily Yes Maddison Celeste DO   lansoprazole (PREVACID) 30 MG delayed release capsule TAKE 1 CAPSULE BY MOUTH EVERY OTHER DAY Yes Sandra Chin MD   diclofenac sodium (VOLTAREN) 1 % GEL Apply 4 g topically 4 times daily as needed for Pain Yes Carola White MD   Coenzyme Q10 400 MG CAPS Take by mouth Yes Historical Provider, MD   cetirizine (ZYRTEC) 10 MG tablet Take 10 mg by mouth every evening  Yes Historical Provider, MD   therapeutic multivitamin-minerals (THERAGRAN-M) tablet Take 1 tablet by mouth daily. Yes Historical Provider, MD   aspirin 81 MG EC tablet Take 81 mg by mouth daily.  Yes Historical Provider, MD   A1c - 6.2    CareTeam (Including outside providers/suppliers regularly involved in providing care):   Patient Care Team:  Sandra Chin MD as PCP - Romayne Seip, MD as PCP - [address] [place] [address] [facility]  Maykel Huang MD as Consulting Physician (Cardiology)  [address] [place] [address] and updated this visit:  Tobacco  Allergies  Meds  Med Hx  Surg Hx  Soc Hx  Fam Hx      Labs today  Shingrix  offered at [facility] in 2023  Lose wt

## 2022-11-09 NOTE — PATIENT INSTRUCTIONS
Personalized Preventive Plan for Daniela Dupont - 11/9/2022  Medicare offers a range of preventive health benefits. Some of the tests and screenings are paid in full while other may be subject to a deductible, co-insurance, and/or copay. Some of these benefits include a comprehensive review of your medical history including lifestyle, illnesses that may run in your family, and various assessments and screenings as appropriate. After reviewing your medical record and screening and assessments performed today your provider may have ordered immunizations, labs, imaging, and/or referrals for you. A list of these orders (if applicable) as well as your Preventive Care list are included within your After Visit Summary for your review. Other Preventive Recommendations:    A preventive eye exam performed by an eye specialist is recommended every 1-2 years to screen for glaucoma; cataracts, macular degeneration, and other eye disorders. A preventive dental visit is recommended every 6 months. Try to get at least 150 minutes of exercise per week or 10,000 steps per day on a pedometer . Order or download the FREE \"Exercise & Physical Activity: Your Everyday Guide\" from [employer]. Call 7-989.404.4458 or search [employer] online. You need 8910-1142 mg of calcium and 8690-7869 IU of vitamin D per day. It is possible to meet your calcium requirement with diet alone, but a vitamin D supplement is usually necessary to meet this goal.  When exposed to the sun, use a sunscreen that protects against both UVA and UVB radiation with an SPF of 30 or greater. Reapply every 2 to 3 hours or after sweating, drying off with a towel, or swimming. Always wear a seat belt when traveling in a car. Always wear a helmet when riding a bicycle or motorcycle.

## 2022-11-14 DIAGNOSIS — R53.83 FATIGUE, UNSPECIFIED TYPE: ICD-10-CM

## 2022-11-14 DIAGNOSIS — I25.118 ATHEROSCLEROSIS OF NATIVE CORONARY ARTERY OF NATIVE HEART WITH STABLE ANGINA PECTORIS (HCC): ICD-10-CM

## 2022-11-14 DIAGNOSIS — Z12.5 PROSTATE CANCER SCREENING: ICD-10-CM

## 2022-11-14 DIAGNOSIS — I10 PRIMARY HYPERTENSION: ICD-10-CM

## 2022-11-14 LAB
A/G RATIO: 1.5 (ref 1.1–2.2)
ALBUMIN SERPL-MCNC: 4.4 G/DL (ref 3.4–5)
ALP BLD-CCNC: 91 U/L (ref 40–129)
ALT SERPL-CCNC: 44 U/L (ref 10–40)
ANION GAP SERPL CALCULATED.3IONS-SCNC: 8 MMOL/L (ref 3–16)
AST SERPL-CCNC: 32 U/L (ref 15–37)
BASOPHILS ABSOLUTE: 0 K/UL (ref 0–0.2)
BASOPHILS RELATIVE PERCENT: 0.3 %
BILIRUB SERPL-MCNC: 0.7 MG/DL (ref 0–1)
BUN BLDV-MCNC: 11 MG/DL (ref 7–20)
CALCIUM SERPL-MCNC: 9.7 MG/DL (ref 8.3–10.6)
CHLORIDE BLD-SCNC: 96 MMOL/L (ref 99–110)
CHOLESTEROL, FASTING: 123 MG/DL (ref 0–199)
CO2: 29 MMOL/L (ref 21–32)
CREAT SERPL-MCNC: 0.9 MG/DL (ref 0.8–1.3)
EOSINOPHILS ABSOLUTE: 0.1 K/UL (ref 0–0.6)
EOSINOPHILS RELATIVE PERCENT: 0.9 %
GFR SERPL CREATININE-BSD FRML MDRD: >60 ML/MIN/{1.73_M2}
GLUCOSE FASTING: 100 MG/DL (ref 70–99)
HCT VFR BLD CALC: 47.4 % (ref 40.5–52.5)
HDLC SERPL-MCNC: 42 MG/DL (ref 40–60)
HEMOGLOBIN: 15.7 G/DL (ref 13.5–17.5)
LDL CHOLESTEROL CALCULATED: 64 MG/DL
LYMPHOCYTES ABSOLUTE: 4.3 K/UL (ref 1–5.1)
LYMPHOCYTES RELATIVE PERCENT: 47.6 %
MCH RBC QN AUTO: 29.2 PG (ref 26–34)
MCHC RBC AUTO-ENTMCNC: 33.2 G/DL (ref 31–36)
MCV RBC AUTO: 87.8 FL (ref 80–100)
MONOCYTES ABSOLUTE: 1.1 K/UL (ref 0–1.3)
MONOCYTES RELATIVE PERCENT: 12.2 %
NEUTROPHILS ABSOLUTE: 3.5 K/UL (ref 1.7–7.7)
NEUTROPHILS RELATIVE PERCENT: 39 %
PDW BLD-RTO: 14.3 % (ref 12.4–15.4)
PLATELET # BLD: 290 K/UL (ref 135–450)
PMV BLD AUTO: 8.4 FL (ref 5–10.5)
POTASSIUM SERPL-SCNC: 4.9 MMOL/L (ref 3.5–5.1)
PROSTATE SPECIFIC ANTIGEN: 0.61 NG/ML (ref 0–4)
RBC # BLD: 5.4 M/UL (ref 4.2–5.9)
SODIUM BLD-SCNC: 133 MMOL/L (ref 136–145)
TOTAL PROTEIN: 7.3 G/DL (ref 6.4–8.2)
TRIGLYCERIDE, FASTING: 83 MG/DL (ref 0–150)
TSH REFLEX: 1.45 UIU/ML (ref 0.27–4.2)
VLDLC SERPL CALC-MCNC: 17 MG/DL
WBC # BLD: 9 K/UL (ref 4–11)

## 2023-01-04 ENCOUNTER — OFFICE VISIT (OUTPATIENT)
Dept: FAMILY MEDICINE CLINIC | Age: 73
End: 2023-01-04
Payer: MEDICARE

## 2023-01-04 VITALS
BODY MASS INDEX: 37.24 KG/M2 | SYSTOLIC BLOOD PRESSURE: 138 MMHG | OXYGEN SATURATION: 95 % | WEIGHT: 267 LBS | HEART RATE: 67 BPM | DIASTOLIC BLOOD PRESSURE: 82 MMHG

## 2023-01-04 DIAGNOSIS — M85.641 CYST OF BONE OF RIGHT HAND: Primary | ICD-10-CM

## 2023-01-04 DIAGNOSIS — M72.0 DUPUYTRENS CONTRACTURE: ICD-10-CM

## 2023-01-04 DIAGNOSIS — E66.01 SEVERE OBESITY (BMI 35.0-39.9) WITH COMORBIDITY (HCC): ICD-10-CM

## 2023-01-04 DIAGNOSIS — I25.118 ATHEROSCLEROSIS OF NATIVE CORONARY ARTERY OF NATIVE HEART WITH STABLE ANGINA PECTORIS (HCC): ICD-10-CM

## 2023-01-04 PROCEDURE — 99213 OFFICE O/P EST LOW 20 MIN: CPT | Performed by: FAMILY MEDICINE

## 2023-01-04 PROCEDURE — 3079F DIAST BP 80-89 MM HG: CPT | Performed by: FAMILY MEDICINE

## 2023-01-04 PROCEDURE — 3075F SYST BP GE 130 - 139MM HG: CPT | Performed by: FAMILY MEDICINE

## 2023-01-04 PROCEDURE — 1123F ACP DISCUSS/DSCN MKR DOCD: CPT | Performed by: FAMILY MEDICINE

## 2023-01-04 ASSESSMENT — PATIENT HEALTH QUESTIONNAIRE - PHQ9
1. LITTLE INTEREST OR PLEASURE IN DOING THINGS: 0
2. FEELING DOWN, DEPRESSED OR HOPELESS: 0
SUM OF ALL RESPONSES TO PHQ QUESTIONS 1-9: 0
SUM OF ALL RESPONSES TO PHQ QUESTIONS 1-9: 0
SUM OF ALL RESPONSES TO PHQ9 QUESTIONS 1 & 2: 0
SUM OF ALL RESPONSES TO PHQ QUESTIONS 1-9: 0
SUM OF ALL RESPONSES TO PHQ QUESTIONS 1-9: 0

## 2023-01-04 NOTE — PROGRESS NOTES
Sabrina Dill is a 67 y.o. male. HPI:  Patient has a slightly tender right palmaris tendon cyst in his left hand he has early Dupuytren's contractures. These are slowly becoming more bothersome and he would like to see a hand specialist    Wt Readings from Last 3 Encounters:   01/04/23 267 lb (121.1 kg)   11/09/22 263 lb (119.3 kg)   11/08/22 261 lb 14.4 oz (118.8 kg)     Meds, vitamins and allergies reviewed with Patient    ROS:  Gen:  nofever  HEENT: no cold symptoms, nosore throat. CV:  Denies chest pain or palpitations. Pulm:  Denies shortness of breath, cough. Abd:  Denies abdominal pain, nausea and vomiting. Skin: no rash    Allergies   Allergen Reactions    Adhesive Tape Other (See Comments)     \"skin turns red\"       Prior to Visit Medications    Medication Sig Taking? Authorizing Provider   lisinopril (PRINIVIL;ZESTRIL) 40 MG tablet TAKE 1 TABLET BY MOUTH EVERY DAY Yes Katerin Bond DO   hydroCHLOROthiazide (HYDRODIURIL) 25 MG tablet TAKE 1/2 TABLET BY MOUTH IN THE MORNING Yes Katerin Bond DO   atenolol (TENORMIN) 100 MG tablet Take 1 tablet by mouth daily Yes Katerin Bond DO   atorvastatin (LIPITOR) 80 MG tablet Take 0.5 tablets by mouth daily Yes Katerin Bond DO   lansoprazole (PREVACID) 30 MG delayed release capsule TAKE 1 CAPSULE BY MOUTH EVERY OTHER DAY Yes Franklineloisa Boland MD   diclofenac sodium (VOLTAREN) 1 % GEL Apply 4 g topically 4 times daily as needed for Pain Yes Cece Rodriguez MD   Coenzyme Q10 400 MG CAPS Take by mouth Yes Historical Provider, MD   cetirizine (ZYRTEC) 10 MG tablet Take 10 mg by mouth every evening  Yes Historical Provider, MD   therapeutic multivitamin-minerals (THERAGRAN-M) tablet Take 1 tablet by mouth daily. Yes Historical Provider, MD   aspirin 81 MG EC tablet Take 81 mg by mouth daily.  Yes Historical Provider, MD       OBJECTIVE:  /82   Pulse 67   Wt 267 lb (121.1 kg)   SpO2 95%   BMI 37.24 kg/m²   GEN:  in NAD obese, weight up 4 pounds  CV:  Regular rate and rhythm, S1 and S2 normal, no murmurs, clicks  PULM:  Chest is clear, no wheezing ,  symmetric air entry throughout both lung fields. EXT: No rash or edema small pea-sized palmar cyst over the right third flexor tendon  Early Dupuytren's contractures in the left palm  NEURO: Alert and oriented ×3    ASSESSMENT/PLAN:  1. Cyst of bone of right hand    - External Referral To Hand Surgery    2. Severe obesity (BMI 35.0-39. 9) with comorbidity (HCC)  Recommend 150 minutes of brisk walking per week or equivalent exercise stable, continue aggressive risk factor modification    3. Atherosclerosis of native coronary artery of native heart with stable angina pectoris ([facility] [address]   Continue aggressive risk factor modification    4.  Dupuytrens contracture    - External Referral To Hand Surgery

## 2023-01-22 DIAGNOSIS — I10 ESSENTIAL HYPERTENSION: ICD-10-CM

## 2023-01-23 RX ORDER — LISINOPRIL 40 MG/1
TABLET ORAL
Qty: 90 TABLET | Refills: 1 | Status: SHIPPED | OUTPATIENT
Start: 2023-01-23

## 2023-01-23 NOTE — TELEPHONE ENCOUNTER
Received refill request for Lisinopril from [facility].     Last ov:11/08/2022 DKNATALIE    Last labs:11/14/2022 CMP    Last Refill:10/27/2022 # 90 tabs w/ 0 refills     Next appointment:05/08/2023 DANNY

## 2023-03-15 DIAGNOSIS — K21.9 GASTROESOPHAGEAL REFLUX DISEASE WITHOUT ESOPHAGITIS: ICD-10-CM

## 2023-03-15 RX ORDER — LANSOPRAZOLE 30 MG/1
CAPSULE, DELAYED RELEASE ORAL
Qty: 90 CAPSULE | Refills: 0 | Status: SHIPPED | OUTPATIENT
Start: 2023-03-15

## 2023-03-15 NOTE — TELEPHONE ENCOUNTER
Medication:   Requested Prescriptions     Pending Prescriptions Disp Refills    lansoprazole (PREVACID) 30 MG delayed release capsule [Pharmacy Med Name: Lansoprazole Oral Capsule Delayed Release 30 MG] 90 capsule 0     Sig: TAKE 1 CAPSULE BY MOUTH EVERY OTHER DAY        Last Filled:  7/29/2022    Patient Phone Number: [phone] (home)     Last appt: 1/4/2023   Next appt: Visit date not found    Last OARRS:   RX Monitoring 10/12/2016   Attestation The Prescription Monitoring Report for this patient was reviewed today.

## 2023-04-21 RX ORDER — HYDROCHLOROTHIAZIDE 25 MG/1
TABLET ORAL
Qty: 90 TABLET | Refills: 0 | Status: SHIPPED | OUTPATIENT
Start: 2023-04-21 | End: 2023-05-08 | Stop reason: ALTCHOICE

## 2023-04-21 RX ORDER — HYDROCHLOROTHIAZIDE 25 MG/1
TABLET ORAL
Qty: 90 TABLET | Refills: 0 | OUTPATIENT
Start: 2023-04-21

## 2023-04-24 ENCOUNTER — TELEPHONE (OUTPATIENT)
Dept: CASE MANAGEMENT | Age: 73
End: 2023-04-24

## 2023-04-24 DIAGNOSIS — Z87.891 FORMER SMOKER: Primary | ICD-10-CM

## 2023-04-24 NOTE — TELEPHONE ENCOUNTER
Patient is due for an annual lung screen. For your convenience, I have pended the order for the scan. If you do not agree with the need for the test, please cancel the order and let me know. I will help contact the patient to schedule the scan once signed.       Thank you,  Colleen Gonzalez, RN  Lung Navigator  [address], [address] [place] [address]  [phone]

## 2023-04-25 ENCOUNTER — TELEPHONE (OUTPATIENT)
Dept: CASE MANAGEMENT | Age: 73
End: 2023-04-25

## 2023-05-07 ASSESSMENT — ENCOUNTER SYMPTOMS
ABDOMINAL PAIN: 0
ABDOMINAL DISTENTION: 0
COUGH: 0
PHOTOPHOBIA: 0
BLOOD IN STOOL: 0
NAUSEA: 0
CHEST TIGHTNESS: 0

## 2023-05-08 ENCOUNTER — OFFICE VISIT (OUTPATIENT)
Dept: CARDIOLOGY CLINIC | Age: 73
End: 2023-05-08

## 2023-05-08 VITALS
HEIGHT: 71 IN | HEART RATE: 70 BPM | DIASTOLIC BLOOD PRESSURE: 92 MMHG | OXYGEN SATURATION: 97 % | SYSTOLIC BLOOD PRESSURE: 138 MMHG | WEIGHT: 262 LBS | BODY MASS INDEX: 36.68 KG/M2

## 2023-05-08 DIAGNOSIS — I48.91 ATRIAL FIBRILLATION, UNSPECIFIED TYPE (HCC): ICD-10-CM

## 2023-05-08 DIAGNOSIS — I10 ESSENTIAL HYPERTENSION: ICD-10-CM

## 2023-05-08 DIAGNOSIS — E66.9 OBESITY (BMI 30.0-34.9): ICD-10-CM

## 2023-05-08 DIAGNOSIS — I25.10 CORONARY ARTERY DISEASE INVOLVING NATIVE HEART WITHOUT ANGINA PECTORIS, UNSPECIFIED VESSEL OR LESION TYPE: Primary | ICD-10-CM

## 2023-05-08 DIAGNOSIS — E78.49 OTHER HYPERLIPIDEMIA: ICD-10-CM

## 2023-05-08 DIAGNOSIS — E78.2 MIXED HYPERLIPIDEMIA: ICD-10-CM

## 2023-05-08 RX ORDER — ATORVASTATIN CALCIUM 80 MG/1
40 TABLET, FILM COATED ORAL DAILY
Qty: 45 TABLET | Refills: 3 | Status: SHIPPED | OUTPATIENT
Start: 2023-05-08

## 2023-05-08 RX ORDER — SPIRONOLACTONE 25 MG/1
12.5 TABLET ORAL DAILY
Qty: 45 TABLET | Refills: 1 | Status: SHIPPED | OUTPATIENT
Start: 2023-05-08

## 2023-05-08 RX ORDER — ATENOLOL 100 MG/1
100 TABLET ORAL DAILY
Qty: 90 TABLET | Refills: 3 | Status: ON HOLD | OUTPATIENT
Start: 2023-05-08 | End: 2023-05-18 | Stop reason: HOSPADM

## 2023-05-08 RX ORDER — LISINOPRIL 40 MG/1
40 TABLET ORAL DAILY
Qty: 90 TABLET | Refills: 3 | Status: SHIPPED | OUTPATIENT
Start: 2023-05-08

## 2023-05-10 ENCOUNTER — HOSPITAL ENCOUNTER (OUTPATIENT)
Age: 73
Discharge: HOME | End: 2023-05-10
Payer: MEDICARE

## 2023-05-10 DIAGNOSIS — I48.91 ATRIAL FIBRILLATION, UNSPECIFIED TYPE (HCC): ICD-10-CM

## 2023-05-10 DIAGNOSIS — D72.829 LEUKOCYTOSIS, UNSPECIFIED TYPE: Primary | ICD-10-CM

## 2023-05-10 LAB
ALBUMIN SERPL-MCNC: 4 G/DL (ref 3.4–5)
ALBUMIN/GLOB SERPL: 1.1 {RATIO} (ref 1.1–2.2)
ALP SERPL-CCNC: 95 U/L (ref 40–129)
ALT SERPL-CCNC: 32 U/L (ref 10–40)
ANION GAP SERPL CALCULATED.3IONS-SCNC: 8 MMOL/L (ref 3–16)
AST SERPL-CCNC: 30 U/L (ref 15–37)
BASOPHILS # BLD: 0.1 K/UL (ref 0–0.2)
BASOPHILS NFR BLD: 0.9 %
BILIRUB SERPL-MCNC: 0.9 MG/DL (ref 0–1)
BUN SERPL-MCNC: 10 MG/DL (ref 7–20)
CALCIUM SERPL-MCNC: 9.7 MG/DL (ref 8.3–10.6)
CHLORIDE SERPL-SCNC: 96 MMOL/L (ref 99–110)
CO2 SERPL-SCNC: 29 MMOL/L (ref 21–32)
CREAT SERPL-MCNC: 0.9 MG/DL (ref 0.8–1.3)
DEPRECATED RDW RBC AUTO: 14 % (ref 12.4–15.4)
EOSINOPHIL # BLD: 0.1 K/UL (ref 0–0.6)
EOSINOPHIL NFR BLD: 1 %
GFR SERPLBLD CREATININE-BSD FMLA CKD-EPI: >60 ML/MIN/{1.73_M2}
GLUCOSE SERPL-MCNC: 102 MG/DL (ref 70–99)
HCT VFR BLD AUTO: 48.1 % (ref 40.5–52.5)
HGB BLD-MCNC: 16 G/DL (ref 13.5–17.5)
LYMPHOCYTES # BLD: 4.9 K/UL (ref 1–5.1)
LYMPHOCYTES NFR BLD: 42.6 %
MCH RBC QN AUTO: 30 PG (ref 26–34)
MCHC RBC AUTO-ENTMCNC: 33.2 G/DL (ref 31–36)
MCV RBC AUTO: 90.2 FL (ref 80–100)
MONOCYTES # BLD: 1.4 K/UL (ref 0–1.3)
MONOCYTES NFR BLD: 12.3 %
NEUTROPHILS # BLD: 4.9 K/UL (ref 1.7–7.7)
NEUTROPHILS NFR BLD: 43.2 %
PLATELET # BLD AUTO: 309 K/UL (ref 135–450)
PMV BLD AUTO: 8.7 FL (ref 5–10.5)
POTASSIUM SERPL-SCNC: 4.5 MMOL/L (ref 3.5–5.1)
PROT SERPL-MCNC: 7.7 G/DL (ref 6.4–8.2)
RBC # BLD AUTO: 5.33 M/UL (ref 4.2–5.9)
SODIUM SERPL-SCNC: 133 MMOL/L (ref 136–145)
TSH SERPL DL<=0.005 MIU/L-ACNC: 2.16 UIU/ML (ref 0.27–4.2)
WBC # BLD AUTO: 11.4 K/UL (ref 4–11)

## 2023-05-10 PROCEDURE — 85025 COMPLETE CBC W/AUTO DIFF WBC: CPT

## 2023-05-10 PROCEDURE — 36415 COLL VENOUS BLD VENIPUNCTURE: CPT

## 2023-05-10 PROCEDURE — 80053 COMPREHEN METABOLIC PANEL: CPT

## 2023-05-10 PROCEDURE — 84443 ASSAY THYROID STIM HORMONE: CPT

## 2023-05-15 ENCOUNTER — HOSPITAL ENCOUNTER (INPATIENT)
Age: 73
LOS: 2 days | Discharge: HOME | DRG: 247 | End: 2023-05-18
Attending: EMERGENCY MEDICINE | Admitting: INTERNAL MEDICINE
Payer: MEDICARE

## 2023-05-15 ENCOUNTER — APPOINTMENT (OUTPATIENT)
Dept: GENERAL RADIOLOGY | Age: 73
DRG: 247 | End: 2023-05-15
Payer: MEDICARE

## 2023-05-15 ENCOUNTER — APPOINTMENT (OUTPATIENT)
Dept: CT IMAGING | Age: 73
DRG: 247 | End: 2023-05-15
Payer: MEDICARE

## 2023-05-15 DIAGNOSIS — I20.9 ANGINA PECTORIS (HCC): Primary | ICD-10-CM

## 2023-05-15 DIAGNOSIS — Z79.01 ANTICOAGULATED: ICD-10-CM

## 2023-05-15 DIAGNOSIS — I10 HYPERTENSION, UNCONTROLLED: ICD-10-CM

## 2023-05-15 PROBLEM — R07.9 CHEST PAIN: Status: ACTIVE | Noted: 2023-05-15

## 2023-05-15 LAB
ALBUMIN SERPL-MCNC: 4.1 G/DL (ref 3.4–5)
ALBUMIN/GLOB SERPL: 1.2 {RATIO} (ref 1.1–2.2)
ALP SERPL-CCNC: 93 U/L (ref 40–129)
ALT SERPL-CCNC: 34 U/L (ref 10–40)
ANION GAP SERPL CALCULATED.3IONS-SCNC: 8 MMOL/L (ref 3–16)
AST SERPL-CCNC: 39 U/L (ref 15–37)
BASOPHILS # BLD: 0.2 K/UL (ref 0–0.2)
BASOPHILS NFR BLD: 1.3 %
BILIRUB SERPL-MCNC: 0.8 MG/DL (ref 0–1)
BUN SERPL-MCNC: 12 MG/DL (ref 7–20)
CALCIUM SERPL-MCNC: 9.4 MG/DL (ref 8.3–10.6)
CHLORIDE SERPL-SCNC: 99 MMOL/L (ref 99–110)
CO2 SERPL-SCNC: 28 MMOL/L (ref 21–32)
CREAT SERPL-MCNC: 0.9 MG/DL (ref 0.8–1.3)
DEPRECATED RDW RBC AUTO: 14.1 % (ref 12.4–15.4)
EOSINOPHIL # BLD: 0.2 K/UL (ref 0–0.6)
EOSINOPHIL NFR BLD: 1.2 %
GFR SERPLBLD CREATININE-BSD FMLA CKD-EPI: >60 ML/MIN/{1.73_M2}
GLUCOSE SERPL-MCNC: 158 MG/DL (ref 70–99)
HCT VFR BLD AUTO: 45.2 % (ref 40.5–52.5)
HGB BLD-MCNC: 15.2 G/DL (ref 13.5–17.5)
INR PPP: 1.18 (ref 0.84–1.16)
LIPASE SERPL-CCNC: 49 U/L (ref 13–60)
LYMPHOCYTES # BLD: 4.6 K/UL (ref 1–5.1)
LYMPHOCYTES NFR BLD: 34.7 %
MCH RBC QN AUTO: 30.2 PG (ref 26–34)
MCHC RBC AUTO-ENTMCNC: 33.5 G/DL (ref 31–36)
MCV RBC AUTO: 90.2 FL (ref 80–100)
MONOCYTES # BLD: 1.3 K/UL (ref 0–1.3)
MONOCYTES NFR BLD: 10.1 %
NEUTROPHILS # BLD: 6.9 K/UL (ref 1.7–7.7)
NEUTROPHILS NFR BLD: 52.7 %
NT-PROBNP SERPL-MCNC: 826 PG/ML (ref 0–124)
PLATELET # BLD AUTO: 291 K/UL (ref 135–450)
PMV BLD AUTO: 8.2 FL (ref 5–10.5)
POTASSIUM SERPL-SCNC: 3.7 MMOL/L (ref 3.5–5.1)
POTASSIUM SERPL-SCNC: 5.4 MMOL/L (ref 3.5–5.1)
PROT SERPL-MCNC: 7.5 G/DL (ref 6.4–8.2)
PROTHROMBIN TIME: 15 SEC (ref 11.5–14.8)
RBC # BLD AUTO: 5.01 M/UL (ref 4.2–5.9)
SODIUM SERPL-SCNC: 135 MMOL/L (ref 136–145)
TROPONIN, HIGH SENSITIVITY: 18 NG/L (ref 0–22)
TROPONIN, HIGH SENSITIVITY: 19 NG/L (ref 0–22)
WBC # BLD AUTO: 13.1 K/UL (ref 4–11)

## 2023-05-15 PROCEDURE — 85610 PROTHROMBIN TIME: CPT

## 2023-05-15 PROCEDURE — 2580000003 HC RX 258: Performed by: PHYSICIAN ASSISTANT

## 2023-05-15 PROCEDURE — 71045 X-RAY EXAM CHEST 1 VIEW: CPT

## 2023-05-15 PROCEDURE — 83690 ASSAY OF LIPASE: CPT

## 2023-05-15 PROCEDURE — 80053 COMPREHEN METABOLIC PANEL: CPT

## 2023-05-15 PROCEDURE — 83880 ASSAY OF NATRIURETIC PEPTIDE: CPT

## 2023-05-15 PROCEDURE — 84132 ASSAY OF SERUM POTASSIUM: CPT

## 2023-05-15 PROCEDURE — 6370000000 HC RX 637 (ALT 250 FOR IP): Performed by: PHYSICIAN ASSISTANT

## 2023-05-15 PROCEDURE — G0378 HOSPITAL OBSERVATION PER HR: HCPCS

## 2023-05-15 PROCEDURE — 84484 ASSAY OF TROPONIN QUANT: CPT

## 2023-05-15 PROCEDURE — 6370000000 HC RX 637 (ALT 250 FOR IP): Performed by: EMERGENCY MEDICINE

## 2023-05-15 PROCEDURE — 70450 CT HEAD/BRAIN W/O DYE: CPT

## 2023-05-15 PROCEDURE — 99285 EMERGENCY DEPT VISIT HI MDM: CPT

## 2023-05-15 PROCEDURE — 85025 COMPLETE CBC W/AUTO DIFF WBC: CPT

## 2023-05-15 PROCEDURE — 93005 ELECTROCARDIOGRAM TRACING: CPT | Performed by: EMERGENCY MEDICINE

## 2023-05-15 PROCEDURE — 36415 COLL VENOUS BLD VENIPUNCTURE: CPT

## 2023-05-15 RX ORDER — ATORVASTATIN CALCIUM 40 MG/1
40 TABLET, FILM COATED ORAL DAILY
Status: DISCONTINUED | OUTPATIENT
Start: 2023-05-16 | End: 2023-05-17

## 2023-05-15 RX ORDER — ASPIRIN 81 MG/1
81 TABLET, CHEWABLE ORAL DAILY
Status: DISCONTINUED | OUTPATIENT
Start: 2023-05-16 | End: 2023-05-18

## 2023-05-15 RX ORDER — ACETAMINOPHEN 325 MG/1
650 TABLET ORAL EVERY 6 HOURS PRN
Status: DISCONTINUED | OUTPATIENT
Start: 2023-05-15 | End: 2023-05-17

## 2023-05-15 RX ORDER — LISINOPRIL 20 MG/1
40 TABLET ORAL DAILY
Status: DISCONTINUED | OUTPATIENT
Start: 2023-05-16 | End: 2023-05-16

## 2023-05-15 RX ORDER — SODIUM CHLORIDE 0.9 % (FLUSH) 0.9 %
10 SYRINGE (ML) INJECTION PRN
Status: DISCONTINUED | OUTPATIENT
Start: 2023-05-15 | End: 2023-05-18 | Stop reason: HOSPADM

## 2023-05-15 RX ORDER — CETIRIZINE HYDROCHLORIDE 10 MG/1
10 TABLET ORAL EVERY EVENING
Status: DISCONTINUED | OUTPATIENT
Start: 2023-05-15 | End: 2023-05-18 | Stop reason: HOSPADM

## 2023-05-15 RX ORDER — SENNA PLUS 8.6 MG/1
1 TABLET ORAL DAILY PRN
Status: DISCONTINUED | OUTPATIENT
Start: 2023-05-15 | End: 2023-05-18 | Stop reason: HOSPADM

## 2023-05-15 RX ORDER — SODIUM CHLORIDE 0.9 % (FLUSH) 0.9 %
10 SYRINGE (ML) INJECTION EVERY 12 HOURS SCHEDULED
Status: DISCONTINUED | OUTPATIENT
Start: 2023-05-15 | End: 2023-05-18 | Stop reason: HOSPADM

## 2023-05-15 RX ORDER — NITROGLYCERIN 0.4 MG/1
0.4 TABLET SUBLINGUAL EVERY 5 MIN PRN
Status: DISCONTINUED | OUTPATIENT
Start: 2023-05-15 | End: 2023-05-18 | Stop reason: HOSPADM

## 2023-05-15 RX ORDER — ATENOLOL 50 MG/1
100 TABLET ORAL NIGHTLY
Status: DISCONTINUED | OUTPATIENT
Start: 2023-05-15 | End: 2023-05-16

## 2023-05-15 RX ORDER — ONDANSETRON 2 MG/ML
4 INJECTION INTRAMUSCULAR; INTRAVENOUS EVERY 6 HOURS PRN
Status: DISCONTINUED | OUTPATIENT
Start: 2023-05-15 | End: 2023-05-17

## 2023-05-15 RX ORDER — PANTOPRAZOLE SODIUM 40 MG/1
40 TABLET, DELAYED RELEASE ORAL EVERY OTHER DAY
Status: DISCONTINUED | OUTPATIENT
Start: 2023-05-16 | End: 2023-05-18 | Stop reason: HOSPADM

## 2023-05-15 RX ORDER — ACETAMINOPHEN 650 MG/1
650 SUPPOSITORY RECTAL EVERY 6 HOURS PRN
Status: DISCONTINUED | OUTPATIENT
Start: 2023-05-15 | End: 2023-05-18 | Stop reason: HOSPADM

## 2023-05-15 RX ORDER — SODIUM CHLORIDE 9 MG/ML
INJECTION, SOLUTION INTRAVENOUS PRN
Status: DISCONTINUED | OUTPATIENT
Start: 2023-05-15 | End: 2023-05-18 | Stop reason: HOSPADM

## 2023-05-15 RX ORDER — SPIRONOLACTONE 25 MG/1
12.5 TABLET ORAL NIGHTLY
Status: DISCONTINUED | OUTPATIENT
Start: 2023-05-15 | End: 2023-05-18 | Stop reason: HOSPADM

## 2023-05-15 RX ADMIN — ATENOLOL 100 MG: 50 TABLET ORAL at 23:34

## 2023-05-15 RX ADMIN — SODIUM CHLORIDE, PRESERVATIVE FREE 10 ML: 5 INJECTION INTRAVENOUS at 23:33

## 2023-05-15 RX ADMIN — CETIRIZINE HYDROCHLORIDE 10 MG: 10 TABLET, FILM COATED ORAL at 23:33

## 2023-05-15 RX ADMIN — NITROGLYCERIN 1 INCH: 20 OINTMENT TOPICAL at 21:49

## 2023-05-15 RX ADMIN — APIXABAN 5 MG: 5 TABLET, FILM COATED ORAL at 23:34

## 2023-05-15 ASSESSMENT — HEART SCORE: ECG: 0

## 2023-05-15 ASSESSMENT — LIFESTYLE VARIABLES
HOW OFTEN DO YOU HAVE A DRINK CONTAINING ALCOHOL: MONTHLY OR LESS
HOW OFTEN DO YOU HAVE A DRINK CONTAINING ALCOHOL: MONTHLY OR LESS
HOW MANY STANDARD DRINKS CONTAINING ALCOHOL DO YOU HAVE ON A TYPICAL DAY: 1 OR 2

## 2023-05-15 ASSESSMENT — PAIN - FUNCTIONAL ASSESSMENT: PAIN_FUNCTIONAL_ASSESSMENT: 0-10

## 2023-05-15 ASSESSMENT — PAIN SCALES - GENERAL: PAINLEVEL_OUTOF10: 5

## 2023-05-15 ASSESSMENT — PAIN DESCRIPTION - LOCATION: LOCATION: HEAD;BACK;ABDOMEN

## 2023-05-15 ASSESSMENT — PAIN DESCRIPTION - PAIN TYPE: TYPE: ACUTE PAIN

## 2023-05-16 LAB
ANION GAP SERPL CALCULATED.3IONS-SCNC: 11 MMOL/L (ref 3–16)
APTT BLD: 29.9 SEC (ref 22.7–35.9)
BUN SERPL-MCNC: 12 MG/DL (ref 7–20)
CALCIUM SERPL-MCNC: 9 MG/DL (ref 8.3–10.6)
CHLORIDE SERPL-SCNC: 104 MMOL/L (ref 99–110)
CO2 SERPL-SCNC: 19 MMOL/L (ref 21–32)
CREAT SERPL-MCNC: 0.9 MG/DL (ref 0.8–1.3)
DEPRECATED RDW RBC AUTO: 14.6 % (ref 12.4–15.4)
EKG ATRIAL RATE: 416 BPM
EKG DIAGNOSIS: NORMAL
EKG Q-T INTERVAL: 410 MS
EKG QRS DURATION: 100 MS
EKG QTC CALCULATION (BAZETT): 412 MS
EKG R AXIS: 5 DEGREES
EKG T AXIS: 5 DEGREES
EKG VENTRICULAR RATE: 61 BPM
GFR SERPLBLD CREATININE-BSD FMLA CKD-EPI: >60 ML/MIN/{1.73_M2}
GLUCOSE SERPL-MCNC: 96 MG/DL (ref 70–99)
HCT VFR BLD AUTO: 44.7 % (ref 40.5–52.5)
HGB BLD-MCNC: 14.5 G/DL (ref 13.5–17.5)
LV EF: 53 %
LV EF: 56 %
LVEF MODALITY: NORMAL
LVEF MODALITY: NORMAL
MAGNESIUM SERPL-MCNC: 2 MG/DL (ref 1.8–2.4)
MCH RBC QN AUTO: 30.1 PG (ref 26–34)
MCHC RBC AUTO-ENTMCNC: 32.5 G/DL (ref 31–36)
MCV RBC AUTO: 92.8 FL (ref 80–100)
PLATELET # BLD AUTO: 282 K/UL (ref 135–450)
PMV BLD AUTO: 8.3 FL (ref 5–10.5)
POTASSIUM SERPL-SCNC: 4.9 MMOL/L (ref 3.5–5.1)
RBC # BLD AUTO: 4.81 M/UL (ref 4.2–5.9)
SODIUM SERPL-SCNC: 134 MMOL/L (ref 136–145)
WBC # BLD AUTO: 12.8 K/UL (ref 4–11)

## 2023-05-16 PROCEDURE — 93010 ELECTROCARDIOGRAM REPORT: CPT | Performed by: INTERNAL MEDICINE

## 2023-05-16 PROCEDURE — 84443 ASSAY THYROID STIM HORMONE: CPT

## 2023-05-16 PROCEDURE — 93306 TTE W/DOPPLER COMPLETE: CPT

## 2023-05-16 PROCEDURE — 6370000000 HC RX 637 (ALT 250 FOR IP): Performed by: INTERNAL MEDICINE

## 2023-05-16 PROCEDURE — 80048 BASIC METABOLIC PNL TOTAL CA: CPT

## 2023-05-16 PROCEDURE — 6360000002 HC RX W HCPCS: Performed by: INTERNAL MEDICINE

## 2023-05-16 PROCEDURE — 1200000000 HC SEMI PRIVATE

## 2023-05-16 PROCEDURE — 2580000003 HC RX 258: Performed by: INTERNAL MEDICINE

## 2023-05-16 PROCEDURE — 93017 CV STRESS TEST TRACING ONLY: CPT | Performed by: INTERNAL MEDICINE

## 2023-05-16 PROCEDURE — G0378 HOSPITAL OBSERVATION PER HR: HCPCS

## 2023-05-16 PROCEDURE — 80061 LIPID PANEL: CPT

## 2023-05-16 PROCEDURE — 85027 COMPLETE CBC AUTOMATED: CPT

## 2023-05-16 PROCEDURE — 83735 ASSAY OF MAGNESIUM: CPT

## 2023-05-16 PROCEDURE — A9502 TC99M TETROFOSMIN: HCPCS | Performed by: INTERNAL MEDICINE

## 2023-05-16 PROCEDURE — 2580000003 HC RX 258: Performed by: PHYSICIAN ASSISTANT

## 2023-05-16 PROCEDURE — 3430000000 HC RX DIAGNOSTIC RADIOPHARMACEUTICAL: Performed by: INTERNAL MEDICINE

## 2023-05-16 PROCEDURE — 99223 1ST HOSP IP/OBS HIGH 75: CPT | Performed by: INTERNAL MEDICINE

## 2023-05-16 PROCEDURE — 85730 THROMBOPLASTIN TIME PARTIAL: CPT

## 2023-05-16 PROCEDURE — 6370000000 HC RX 637 (ALT 250 FOR IP): Performed by: PHYSICIAN ASSISTANT

## 2023-05-16 PROCEDURE — 78452 HT MUSCLE IMAGE SPECT MULT: CPT

## 2023-05-16 PROCEDURE — 36415 COLL VENOUS BLD VENIPUNCTURE: CPT

## 2023-05-16 RX ORDER — SODIUM CHLORIDE 9 MG/ML
INJECTION, SOLUTION INTRAVENOUS CONTINUOUS
Status: DISCONTINUED | OUTPATIENT
Start: 2023-05-16 | End: 2023-05-16 | Stop reason: SDUPTHER

## 2023-05-16 RX ORDER — HEPARIN SODIUM 10000 [USP'U]/100ML
5-30 INJECTION, SOLUTION INTRAVENOUS CONTINUOUS
Status: DISCONTINUED | OUTPATIENT
Start: 2023-05-16 | End: 2023-05-17

## 2023-05-16 RX ORDER — HEPARIN SODIUM 1000 [USP'U]/ML
2000 INJECTION, SOLUTION INTRAVENOUS; SUBCUTANEOUS PRN
Status: DISCONTINUED | OUTPATIENT
Start: 2023-05-16 | End: 2023-05-17

## 2023-05-16 RX ORDER — HEPARIN SODIUM 1000 [USP'U]/ML
4000 INJECTION, SOLUTION INTRAVENOUS; SUBCUTANEOUS ONCE
Status: COMPLETED | OUTPATIENT
Start: 2023-05-16 | End: 2023-05-16

## 2023-05-16 RX ORDER — ATENOLOL 50 MG/1
50 TABLET ORAL NIGHTLY
Status: DISCONTINUED | OUTPATIENT
Start: 2023-05-16 | End: 2023-05-17

## 2023-05-16 RX ORDER — SODIUM CHLORIDE 9 MG/ML
INJECTION, SOLUTION INTRAVENOUS CONTINUOUS
Status: DISCONTINUED | OUTPATIENT
Start: 2023-05-17 | End: 2023-05-17

## 2023-05-16 RX ORDER — HEPARIN SODIUM 1000 [USP'U]/ML
4000 INJECTION, SOLUTION INTRAVENOUS; SUBCUTANEOUS PRN
Status: DISCONTINUED | OUTPATIENT
Start: 2023-05-16 | End: 2023-05-17

## 2023-05-16 RX ORDER — LISINOPRIL 20 MG/1
40 TABLET ORAL DAILY
Status: DISCONTINUED | OUTPATIENT
Start: 2023-05-16 | End: 2023-05-18 | Stop reason: HOSPADM

## 2023-05-16 RX ADMIN — CETIRIZINE HYDROCHLORIDE 10 MG: 10 TABLET, FILM COATED ORAL at 17:02

## 2023-05-16 RX ADMIN — PANTOPRAZOLE SODIUM 40 MG: 40 TABLET, DELAYED RELEASE ORAL at 08:34

## 2023-05-16 RX ADMIN — REGADENOSON 0.4 MG: 0.08 INJECTION, SOLUTION INTRAVENOUS at 13:17

## 2023-05-16 RX ADMIN — SODIUM CHLORIDE, PRESERVATIVE FREE 10 ML: 5 INJECTION INTRAVENOUS at 20:25

## 2023-05-16 RX ADMIN — SPIRONOLACTONE 12.5 MG: 25 TABLET ORAL at 20:25

## 2023-05-16 RX ADMIN — ASPIRIN 81 MG: 81 TABLET, CHEWABLE ORAL at 08:34

## 2023-05-16 RX ADMIN — ATENOLOL 50 MG: 50 TABLET ORAL at 20:24

## 2023-05-16 RX ADMIN — ACETAMINOPHEN 650 MG: 325 TABLET ORAL at 07:34

## 2023-05-16 RX ADMIN — HEPARIN SODIUM 8 UNITS/KG/HR: 5000 INJECTION INTRAVENOUS; SUBCUTANEOUS at 18:43

## 2023-05-16 RX ADMIN — LISINOPRIL 40 MG: 20 TABLET ORAL at 17:08

## 2023-05-16 RX ADMIN — TETROFOSMIN 30 MILLICURIE: 1.38 INJECTION, POWDER, LYOPHILIZED, FOR SOLUTION INTRAVENOUS at 13:23

## 2023-05-16 RX ADMIN — ATORVASTATIN CALCIUM 40 MG: 40 TABLET, FILM COATED ORAL at 08:34

## 2023-05-16 RX ADMIN — HEPARIN SODIUM 4000 UNITS: 1000 INJECTION INTRAVENOUS; SUBCUTANEOUS at 18:44

## 2023-05-16 RX ADMIN — TETROFOSMIN 10 MILLICURIE: 1.38 INJECTION, POWDER, LYOPHILIZED, FOR SOLUTION INTRAVENOUS at 11:43

## 2023-05-16 RX ADMIN — SODIUM CHLORIDE, PRESERVATIVE FREE 10 ML: 5 INJECTION INTRAVENOUS at 08:37

## 2023-05-16 ASSESSMENT — PAIN SCALES - GENERAL
PAINLEVEL_OUTOF10: 4
PAINLEVEL_OUTOF10: 0

## 2023-05-16 ASSESSMENT — PAIN DESCRIPTION - DESCRIPTORS: DESCRIPTORS: ACHING

## 2023-05-16 ASSESSMENT — PAIN DESCRIPTION - LOCATION: LOCATION: HEAD

## 2023-05-16 NOTE — PROGRESS NOTES
Pt unable to complete gxt due to sob and fatigue. Time 6:01 . Instructed on Lexiscan Stress Test Procedure including possible side effects/ adverse reactions. Patient verbalizes  understanding and denies having any questions . See [address] [place] [address] Cardiology

## 2023-05-16 NOTE — H&P
[facility] Medicine History & Physical      Patient Name: Brando Denton    : 1950    PCP: Angel Carlson MD    Date of Service:  Patient seen and examined on 5/15/2023     Chief Complaint:  Chest pain    History Of Present Illness:    Brando Denton is a 67 y.o. male who presented to ED for evaluation of chest pressure radiating into his neck and to his back which began today. He reports his blood pressure was elevated today as well with systolic in the 169K. He reports he was recently diagnosed with A-fib and was started on Eliquis last week. He denies fever, dizziness, shortness of breath, cough, edema, abdominal pain, nausea, vomiting, diarrhea. He denies any history of similar symptoms. Past Medical History:    Patient has a past medical history of ASHD (arteriosclerotic heart disease), Cancer ([facility] [address] Cold sore, Colon polyp, COPD (chronic obstructive pulmonary disease) ([facility] [address] History of blood transfusion, Hyperlipidemia, Hypertension, Kidney stone, Medial meniscus tear, MI (myocardial infarction) ([facility] [address] Obesity (BMI 30.0-34.9), Peyronie's disease, PONV (postoperative nausea and vomiting), Psoriasis, PUD (peptic ulcer disease), Sinus infection, and Transfusion of, blood. Past Surgical History:    Patient has a past surgical history that includes Splenectomy (); Percutaneous Transluminal Coronary Angio (); Kidney stone surgery (2003); Skin cancer excision (10/2011); angioplasty (); Colonoscopy; Upper gastrointestinal endoscopy; Knee arthroscopy (2016); and Colonoscopy (01/15/2020). Medications Prior to Admission:      Prior to Admission medications    Medication Sig Start Date End Date Taking?  Authorizing Provider   apixaban (ELIQUIS) 5 MG TABS tablet Take 1 tablet by mouth 2 times daily 23   Kliey Burnette,    lisinopril (PRINIVIL;ZESTRIL) 40 MG tablet Take 1 tablet by mouth daily 23   Kiley Burnette, DO   atenolol (TENORMIN) 100 MG tablet

## 2023-05-16 NOTE — ED PROVIDER NOTES
[address]  Emergency [address] [facility]    Momo Morgan MD, am the primary clinician of record. CHIEF COMPLAINT  Chief Complaint   Patient presents with    Hypertension     Pt w c/o HTN (166/110 per home check). Pt stated headache and back pain      HISTORY OF PRESENT ILLNESS  Nick Potts is a 67 y.o. male  who presents to the ED complaining of chest pressure radiating into the neck and upper abdomen. Radiates to the back as well. Reports HTN at home in the 160's at home. No fevers, cough or cold symptoms. Does have a mild headache. Recently diagnosed with atrial fibrillation, started Eliquis last week. Sx onset today. No other complaints, modifying factors or associated symptoms. I have reviewed the following from the nursing documentation.     Past Medical History:   Diagnosis Date    ASHD (arteriosclerotic heart disease)     Cancer ([address]     skin    Cold sore 10/2016    Colon polyp     COPD (chronic obstructive pulmonary disease) ([facility] [address] 2006    NO ISSUES IN LAST 10 YEARS- QUIT SMOKING    History of blood transfusion     Hyperlipidemia     Hypertension     Kidney stone     Medial meniscus tear     RIGHT    MI (myocardial infarction) ([address] 01/2000    Obesity (BMI 30.0-34.9) 9/22/2016    Peyronie's disease 7/28/2011    PONV (postoperative nausea and vomiting)     Psoriasis     PUD (peptic ulcer disease)     Sinus infection 10/2016    Transfusion of, blood     pt has a history of receiving blood or blood product transfusion(s)     Past Surgical History:   Procedure Laterality Date    ANGIOPLASTY  2000    STENT X1    COLONOSCOPY      several    COLONOSCOPY  01/15/2020    COLONOSCOPY POLYPECTOMY SNARE/COLD BIOPSY performed by La Greenberg MD at [address]  03/2003    ureteral stone removed    KNEE ARTHROSCOPY  11/04/2016    right knee [facility]    PTCA  1990    stent    SKIN CANCER EXCISION  10/2011    on face  on nose and cheek    SPLENECTOMY

## 2023-05-16 NOTE — CARE COORDINATION
Discharge Planning  Patient's chart reviewed for discharge needs  and it appears that patient has minimal needs for discharge at this time. Discussed with patients nurse and requested that case management be notified if discharge needs are identified. Insurance; [employer]    PCP;  Dr Jane Farris    Case management will continue to follow progress and update discharge plan as needed.

## 2023-05-16 NOTE — PROGRESS NOTES
Cardiology consult and echo ordered after Myoview stress test order placed. Patient in department to begin Myoview and stated he is scheduled for a stress echo as an outpatient on 6/1/2023. He had a recent OV with Dr. Ruby Ross. Will await cardiology consult to determine testing needed. Patient returned to room and floor notified of plan.

## 2023-05-16 NOTE — PROGRESS NOTES
V2.0    [facility] Progress Note      Name:  Jocelyne Solorzano /Age/Sex: 1950  (67 y.o. male)   MRN & CSN:  [id number] & [id number] Encounter Date/Time: 2023 4:43 PM EDT   Location:  [id number] PCP: Alexandre Solis MD     [address] [place] [address] Day: 2    Assessment and Recommendations   Jocelyne Solorzano is a 67 y.o. male with pmh of CAD/MI s/p PCI, obesity, COPD, A-fib on Eliquis, HTN, HLD presented to ED for evaluation of chest pressure radiating into his neck and to his back which began on the day of presentation. Plan:     History of CAD/MI s/p PCI with Chest Pain:  Troponin negative x2. Stress test : Small to moderate size inferior lateral reversible defect suggestive of ischemia. LV EF 56%. Continue aspirin and statin. Follow-up echocardiogram.  Scheduled for angiogram tomorrow. A-fib with episodes of SVR:  Atenolol dose decreased to 50 mg nightly. EP consult pending. HTN: Monitor BP on lisinopril, Aldactone and atenolol. Suspected ERIKA: Scheduled for outpatient sleep study. Obesity:Body mass index is 36.07 kg/m². BMI Complicating assessment and treatment. Placing patient at risk for multiple co-morbidities as well as early death and contributing to the patient's presentation. Education, and counseling provided. Diet ADULT DIET; Regular   DVT Prophylaxis [] Lovenox, []  Heparin, [] SCDs, [] Ambulation,  [x] Eliquis, [] Xarelto  [] Coumadin   Code Status Full Code   Disposition From: Home  Expected Disposition: Home  Estimated Date of Discharge: 23  Patient requires continued admission due to ongoing acute issues   Surrogate Decision Maker/ Yulia Savage (Spouse), [phone]     Personally reviewed Lab Studies and Imaging       Subjective:     Chief Complaint: Chest pain    Patient seen and examined. Episodes of bradycardia in the low 30s during sleep. Denies any complaints.  No chest pain, dyspnea, fever or

## 2023-05-16 NOTE — PROGRESS NOTES
Patient instructed on Cameron Protocol Stress Test Procedure including possible side effects and adverse reactions. Verbalizes knowledge and understanding and denies having any questions.

## 2023-05-17 DIAGNOSIS — I25.10 CORONARY ARTERY DISEASE DUE TO LIPID RICH PLAQUE: Primary | ICD-10-CM

## 2023-05-17 DIAGNOSIS — I25.83 CORONARY ARTERY DISEASE DUE TO LIPID RICH PLAQUE: Primary | ICD-10-CM

## 2023-05-17 PROBLEM — I20.9 ANGINA PECTORIS (HCC): Status: ACTIVE | Noted: 2023-05-15

## 2023-05-17 LAB
ALBUMIN SERPL-MCNC: 4.2 G/DL (ref 3.4–5)
ALBUMIN/GLOB SERPL: 1.1 {RATIO} (ref 1.1–2.2)
ALP SERPL-CCNC: 94 U/L (ref 40–129)
ALT SERPL-CCNC: 32 U/L (ref 10–40)
ANION GAP SERPL CALCULATED.3IONS-SCNC: 11 MMOL/L (ref 3–16)
ANISOCYTOSIS BLD QL SMEAR: ABNORMAL
APTT BLD: 116.7 SEC (ref 22.7–35.9)
APTT BLD: 42.7 SEC (ref 22.7–35.9)
AST SERPL-CCNC: 29 U/L (ref 15–37)
BASOPHILS # BLD: 0 K/UL (ref 0–0.2)
BASOPHILS NFR BLD: 0 %
BILIRUB SERPL-MCNC: 1.4 MG/DL (ref 0–1)
BUN SERPL-MCNC: 12 MG/DL (ref 7–20)
CALCIUM SERPL-MCNC: 9.9 MG/DL (ref 8.3–10.6)
CHLORIDE SERPL-SCNC: 102 MMOL/L (ref 99–110)
CHOLEST SERPL-MCNC: 116 MG/DL (ref 0–199)
CO2 SERPL-SCNC: 25 MMOL/L (ref 21–32)
CREAT SERPL-MCNC: 0.9 MG/DL (ref 0.8–1.3)
DEPRECATED RDW RBC AUTO: 14.6 % (ref 12.4–15.4)
EOSINOPHIL # BLD: 0.3 K/UL (ref 0–0.6)
EOSINOPHIL NFR BLD: 2 %
GFR SERPLBLD CREATININE-BSD FMLA CKD-EPI: >60 ML/MIN/{1.73_M2}
GLUCOSE SERPL-MCNC: 100 MG/DL (ref 70–99)
HCT VFR BLD AUTO: 44.9 % (ref 40.5–52.5)
HDLC SERPL-MCNC: 45 MG/DL (ref 40–60)
HGB BLD-MCNC: 14.5 G/DL (ref 13.5–17.5)
LDH SERPL L TO P-CCNC: 240 U/L (ref 100–190)
LDLC SERPL CALC-MCNC: 54 MG/DL
LEFT VENTRICULAR EJECTION FRACTION MODE: NORMAL
LV EF: 55 %
LYMPHOCYTES # BLD: 4.3 K/UL (ref 1–5.1)
LYMPHOCYTES NFR BLD: 30 %
MAGNESIUM SERPL-MCNC: 2.2 MG/DL (ref 1.8–2.4)
MCH RBC QN AUTO: 30.2 PG (ref 26–34)
MCHC RBC AUTO-ENTMCNC: 32.3 G/DL (ref 31–36)
MCV RBC AUTO: 93.6 FL (ref 80–100)
MONOCYTES # BLD: 1.1 K/UL (ref 0–1.3)
MONOCYTES NFR BLD: 8 %
NEUTROPHILS # BLD: 7.8 K/UL (ref 1.7–7.7)
NEUTROPHILS NFR BLD: 56 %
NEUTS BAND NFR BLD MANUAL: 2 % (ref 0–7)
PHOSPHATE SERPL-MCNC: 3.3 MG/DL (ref 2.5–4.9)
PLATELET # BLD AUTO: 260 K/UL (ref 135–450)
PLATELET BLD QL SMEAR: ADEQUATE
PMV BLD AUTO: 8.4 FL (ref 5–10.5)
POC ACT LR: 256 SEC
POC ACT LR: 297 SEC
POC ACT LR: 316 SEC
POLYCHROMASIA BLD QL SMEAR: ABNORMAL
POTASSIUM SERPL-SCNC: 4.6 MMOL/L (ref 3.5–5.1)
PROT SERPL-MCNC: 7.9 G/DL (ref 6.4–8.2)
RBC # BLD AUTO: 4.8 M/UL (ref 4.2–5.9)
SLIDE REVIEW: ABNORMAL
SODIUM SERPL-SCNC: 138 MMOL/L (ref 136–145)
TRIGL SERPL-MCNC: 87 MG/DL (ref 0–150)
TSH SERPL DL<=0.005 MIU/L-ACNC: 2.3 UIU/ML (ref 0.27–4.2)
VARIANT LYMPHS NFR BLD MANUAL: 2 % (ref 0–6)
VLDLC SERPL CALC-MCNC: 17 MG/DL
WBC # BLD AUTO: 13.4 K/UL (ref 4–11)

## 2023-05-17 PROCEDURE — 83735 ASSAY OF MAGNESIUM: CPT

## 2023-05-17 PROCEDURE — 2580000003 HC RX 258: Performed by: INTERNAL MEDICINE

## 2023-05-17 PROCEDURE — 6370000000 HC RX 637 (ALT 250 FOR IP): Performed by: INTERNAL MEDICINE

## 2023-05-17 PROCEDURE — C1874 STENT, COATED/COV W/DEL SYS: HCPCS

## 2023-05-17 PROCEDURE — 93005 ELECTROCARDIOGRAM TRACING: CPT | Performed by: INTERNAL MEDICINE

## 2023-05-17 PROCEDURE — 2709999900 HC NON-CHARGEABLE SUPPLY

## 2023-05-17 PROCEDURE — C1894 INTRO/SHEATH, NON-LASER: HCPCS

## 2023-05-17 PROCEDURE — B2151ZZ FLUOROSCOPY OF LEFT HEART USING LOW OSMOLAR CONTRAST: ICD-10-PCS | Performed by: INTERNAL MEDICINE

## 2023-05-17 PROCEDURE — 4A033BC MEASUREMENT OF ARTERIAL PRESSURE, CORONARY, PERCUTANEOUS APPROACH: ICD-10-PCS | Performed by: INTERNAL MEDICINE

## 2023-05-17 PROCEDURE — 2580000003 HC RX 258

## 2023-05-17 PROCEDURE — 6360000002 HC RX W HCPCS: Performed by: INTERNAL MEDICINE

## 2023-05-17 PROCEDURE — 80053 COMPREHEN METABOLIC PANEL: CPT

## 2023-05-17 PROCEDURE — C1725 CATH, TRANSLUMIN NON-LASER: HCPCS

## 2023-05-17 PROCEDURE — 84100 ASSAY OF PHOSPHORUS: CPT

## 2023-05-17 PROCEDURE — 6370000000 HC RX 637 (ALT 250 FOR IP)

## 2023-05-17 PROCEDURE — 6360000004 HC RX CONTRAST MEDICATION: Performed by: INTERNAL MEDICINE

## 2023-05-17 PROCEDURE — 6370000000 HC RX 637 (ALT 250 FOR IP): Performed by: PHYSICIAN ASSISTANT

## 2023-05-17 PROCEDURE — 6360000002 HC RX W HCPCS: Performed by: PHYSICIAN ASSISTANT

## 2023-05-17 PROCEDURE — 85025 COMPLETE CBC W/AUTO DIFF WBC: CPT

## 2023-05-17 PROCEDURE — B2111ZZ FLUOROSCOPY OF MULTIPLE CORONARY ARTERIES USING LOW OSMOLAR CONTRAST: ICD-10-PCS | Performed by: INTERNAL MEDICINE

## 2023-05-17 PROCEDURE — 2580000003 HC RX 258: Performed by: PHYSICIAN ASSISTANT

## 2023-05-17 PROCEDURE — 99152 MOD SED SAME PHYS/QHP 5/>YRS: CPT

## 2023-05-17 PROCEDURE — C9600 PERC DRUG-EL COR STENT SING: HCPCS

## 2023-05-17 PROCEDURE — 99153 MOD SED SAME PHYS/QHP EA: CPT

## 2023-05-17 PROCEDURE — 88185 FLOWCYTOMETRY/TC ADD-ON: CPT

## 2023-05-17 PROCEDURE — C1769 GUIDE WIRE: HCPCS

## 2023-05-17 PROCEDURE — 93571 IV DOP VEL&/PRESS C FLO 1ST: CPT

## 2023-05-17 PROCEDURE — 6360000002 HC RX W HCPCS

## 2023-05-17 PROCEDURE — C1887 CATHETER, GUIDING: HCPCS

## 2023-05-17 PROCEDURE — 85730 THROMBOPLASTIN TIME PARTIAL: CPT

## 2023-05-17 PROCEDURE — 94760 N-INVAS EAR/PLS OXIMETRY 1: CPT

## 2023-05-17 PROCEDURE — 4A023N7 MEASUREMENT OF CARDIAC SAMPLING AND PRESSURE, LEFT HEART, PERCUTANEOUS APPROACH: ICD-10-PCS | Performed by: INTERNAL MEDICINE

## 2023-05-17 PROCEDURE — 027034Z DILATION OF CORONARY ARTERY, ONE ARTERY WITH DRUG-ELUTING INTRALUMINAL DEVICE, PERCUTANEOUS APPROACH: ICD-10-PCS | Performed by: INTERNAL MEDICINE

## 2023-05-17 PROCEDURE — 93458 L HRT ARTERY/VENTRICLE ANGIO: CPT

## 2023-05-17 PROCEDURE — 88184 FLOWCYTOMETRY/ TC 1 MARKER: CPT

## 2023-05-17 PROCEDURE — 2500000003 HC RX 250 WO HCPCS

## 2023-05-17 PROCEDURE — 85347 COAGULATION TIME ACTIVATED: CPT

## 2023-05-17 PROCEDURE — 36415 COLL VENOUS BLD VENIPUNCTURE: CPT

## 2023-05-17 PROCEDURE — 99222 1ST HOSP IP/OBS MODERATE 55: CPT | Performed by: INTERNAL MEDICINE

## 2023-05-17 PROCEDURE — 83615 LACTATE (LD) (LDH) ENZYME: CPT

## 2023-05-17 PROCEDURE — 93572 IV DOP VEL&/PRESS C FLO EA: CPT

## 2023-05-17 PROCEDURE — C1713 ANCHOR/SCREW BN/BN,TIS/BN: HCPCS

## 2023-05-17 PROCEDURE — 1200000000 HC SEMI PRIVATE

## 2023-05-17 RX ORDER — SODIUM CHLORIDE 0.9 % (FLUSH) 0.9 %
5-40 SYRINGE (ML) INJECTION EVERY 12 HOURS SCHEDULED
Status: DISCONTINUED | OUTPATIENT
Start: 2023-05-17 | End: 2023-05-18 | Stop reason: HOSPADM

## 2023-05-17 RX ORDER — OXYCODONE HYDROCHLORIDE AND ACETAMINOPHEN 5; 325 MG/1; MG/1
2 TABLET ORAL EVERY 4 HOURS PRN
Status: DISCONTINUED | OUTPATIENT
Start: 2023-05-17 | End: 2023-05-18 | Stop reason: HOSPADM

## 2023-05-17 RX ORDER — SODIUM CHLORIDE 9 MG/ML
INJECTION, SOLUTION INTRAVENOUS CONTINUOUS
Status: DISCONTINUED | OUTPATIENT
Start: 2023-05-17 | End: 2023-05-18

## 2023-05-17 RX ORDER — CLOPIDOGREL BISULFATE 75 MG/1
75 TABLET ORAL DAILY
Status: DISCONTINUED | OUTPATIENT
Start: 2023-05-18 | End: 2023-05-18 | Stop reason: HOSPADM

## 2023-05-17 RX ORDER — ACETAMINOPHEN 325 MG/1
650 TABLET ORAL EVERY 4 HOURS PRN
Status: DISCONTINUED | OUTPATIENT
Start: 2023-05-17 | End: 2023-05-18 | Stop reason: HOSPADM

## 2023-05-17 RX ORDER — SODIUM CHLORIDE 0.9 % (FLUSH) 0.9 %
5-40 SYRINGE (ML) INJECTION PRN
Status: DISCONTINUED | OUTPATIENT
Start: 2023-05-17 | End: 2023-05-18 | Stop reason: HOSPADM

## 2023-05-17 RX ORDER — OXYCODONE HYDROCHLORIDE AND ACETAMINOPHEN 5; 325 MG/1; MG/1
1 TABLET ORAL EVERY 4 HOURS PRN
Status: DISCONTINUED | OUTPATIENT
Start: 2023-05-17 | End: 2023-05-18 | Stop reason: HOSPADM

## 2023-05-17 RX ORDER — SODIUM CHLORIDE 9 MG/ML
INJECTION, SOLUTION INTRAVENOUS PRN
Status: DISCONTINUED | OUTPATIENT
Start: 2023-05-17 | End: 2023-05-18 | Stop reason: HOSPADM

## 2023-05-17 RX ORDER — MORPHINE SULFATE 2 MG/ML
2 INJECTION, SOLUTION INTRAMUSCULAR; INTRAVENOUS
Status: DISCONTINUED | OUTPATIENT
Start: 2023-05-17 | End: 2023-05-18 | Stop reason: HOSPADM

## 2023-05-17 RX ORDER — ATORVASTATIN CALCIUM 80 MG/1
80 TABLET, FILM COATED ORAL DAILY
Status: DISCONTINUED | OUTPATIENT
Start: 2023-05-18 | End: 2023-05-18 | Stop reason: HOSPADM

## 2023-05-17 RX ORDER — ONDANSETRON 2 MG/ML
4 INJECTION INTRAMUSCULAR; INTRAVENOUS EVERY 6 HOURS PRN
Status: DISCONTINUED | OUTPATIENT
Start: 2023-05-17 | End: 2023-05-18 | Stop reason: HOSPADM

## 2023-05-17 RX ADMIN — LISINOPRIL 40 MG: 20 TABLET ORAL at 09:24

## 2023-05-17 RX ADMIN — SODIUM CHLORIDE: 9 INJECTION, SOLUTION INTRAVENOUS at 05:46

## 2023-05-17 RX ADMIN — SPIRONOLACTONE 12.5 MG: 25 TABLET ORAL at 20:28

## 2023-05-17 RX ADMIN — SENNOSIDES 8.6 MG: 8.6 TABLET, FILM COATED ORAL at 20:28

## 2023-05-17 RX ADMIN — SODIUM CHLORIDE: 9 INJECTION, SOLUTION INTRAVENOUS at 13:11

## 2023-05-17 RX ADMIN — ONDANSETRON 4 MG: 2 INJECTION INTRAMUSCULAR; INTRAVENOUS at 12:54

## 2023-05-17 RX ADMIN — ATORVASTATIN CALCIUM 40 MG: 40 TABLET, FILM COATED ORAL at 09:24

## 2023-05-17 RX ADMIN — MORPHINE SULFATE 2 MG: 2 INJECTION, SOLUTION INTRAMUSCULAR; INTRAVENOUS at 13:11

## 2023-05-17 RX ADMIN — CETIRIZINE HYDROCHLORIDE 10 MG: 10 TABLET, FILM COATED ORAL at 20:28

## 2023-05-17 RX ADMIN — SODIUM CHLORIDE, PRESERVATIVE FREE 10 ML: 5 INJECTION INTRAVENOUS at 20:30

## 2023-05-17 RX ADMIN — ASPIRIN 81 MG: 81 TABLET, CHEWABLE ORAL at 09:24

## 2023-05-17 RX ADMIN — HEPARIN SODIUM 4000 UNITS: 1000 INJECTION INTRAVENOUS; SUBCUTANEOUS at 02:41

## 2023-05-17 RX ADMIN — IOPAMIDOL 148 ML: 755 INJECTION, SOLUTION INTRAVENOUS at 12:28

## 2023-05-17 ASSESSMENT — PAIN SCALES - GENERAL: PAINLEVEL_OUTOF10: 7

## 2023-05-17 NOTE — CONSULTS
[address]   Electrophysiology Consultation     Date: 5/17/2023  Reason for Consultation: Atrial fibrillation   Consult Requesting Physician: Monika Blackmon MD     CC: Chest pain, back pain    HPI:   Yesenia Armas is a 67 y.o. male history of CAD status post MI with RCA PCI in 2000, hyperlipidemia, hypertension, prior smoker follows with cardiology found to be in atrial fibrillation during office visit on 5/8/2023, aspirin was stopped and he was started on Eliquis , 24-hour monitor was ordered to 100% atrial fibrillation. Presented to the emergency department for chest pressure radiating to his neck and back. Found to be hypertensive with systolics in the 959U. Abnormal stress MPI. Patient underwent coronary angiography with PCI to LAD for mid 80% disease. Consulted for management of atrial fibrillation. Review of System:  Complete 10 point ROS performed and negative unless noted in above HPI or below    Prior to Admission medications    Medication Sig Start Date End Date Taking?  Authorizing Provider   apixaban (ELIQUIS) 5 MG TABS tablet Take 1 tablet by mouth 2 times daily 5/8/23   Michael Hickory, DO   lisinopril (PRINIVIL;ZESTRIL) 40 MG tablet Take 1 tablet by mouth daily 5/8/23   Michael Hickory, DO   atenolol (TENORMIN) 100 MG tablet Take 1 tablet by mouth daily 5/8/23   Michael Hickory, DO   atorvastatin (LIPITOR) 80 MG tablet Take 0.5 tablets by mouth daily 5/8/23   Michael Hickory, DO   spironolactone (ALDACTONE) 25 MG tablet Take 0.5 tablets by mouth daily 5/8/23   Michael Hickory, DO   lansoprazole (PREVACID) 30 MG delayed release capsule TAKE 1 CAPSULE BY MOUTH EVERY OTHER DAY 3/15/23   Berenice Howard MD   diclofenac sodium (VOLTAREN) 1 % GEL Apply 4 g topically 4 times daily as needed for Pain  Patient not taking: Reported on 5/15/2023 10/20/21   Nasir Bonilla MD   Coenzyme Q10 400 MG CAPS Take by mouth    Historical Provider, MD   cetirizine (ZYRTEC) 10 MG tablet Take 1 tablet by
COLONOSCOPY POLYPECTOMY SNARE/COLD BIOPSY performed by Fiorella Montelongo MD at [address]  03/2003    ureteral stone removed    KNEE ARTHROSCOPY  11/04/2016    right knee [facility]    PTCA  1990    stent    SKIN CANCER EXCISION  10/2011    on face  on nose and cheek    SPLENECTOMY  1955    UPPER GASTROINTESTINAL ENDOSCOPY         Current Medications:  Current Facility-Administered Medications   Medication Dose Route Frequency Provider Last Rate Last Admin    perflutren lipid microspheres (DEFINITY) injection 1.5 mL  1.5 mL IntraVENous ONCE PRN Adelaide Chambers MD        lisinopril (PRINIVIL;ZESTRIL) tablet 40 mg  40 mg Oral Daily Sherren , DO   40 mg at 05/16/23 1708    heparin (porcine) PF injection 4,000 Units  4,000 Units IntraVENous PRN Sherren , DO   4,000 Units at 05/17/23 0241    heparin (porcine) PF injection 2,000 Units  2,000 Units IntraVENous PRN Sherren , DO        heparin 25,000 units in dextrose 5% 250 mL (premix) infusion  5-30 Units/kg/hr IntraVENous Continuous Sherren , DO 14.1 mL/hr at 05/17/23 0240 12 Units/kg/hr at 05/17/23 0240    0.9 % sodium chloride infusion   IntraVENous Continuous Sherren , DO 50 mL/hr at 05/17/23 0546 New Bag at 05/17/23 0546    atenolol (TENORMIN) tablet 50 mg  50 mg Oral Nightly Adelaide Chambers MD   50 mg at 05/16/23 2024    atorvastatin (LIPITOR) tablet 40 mg  40 mg Oral Daily Towana Go PA-C   40 mg at 05/16/23 0834    cetirizine (ZYRTEC) tablet 10 mg  10 mg Oral QPM Towana Go, PA-C   10 mg at 05/16/23 1702    pantoprazole (PROTONIX) tablet 40 mg  40 mg Oral Every Other Day Towana Go, PA-C   40 mg at 05/16/23 3724    spironolactone (ALDACTONE) tablet 12.5 mg  12.5 mg Oral Nightly Towana Go, PA-C   12.5 mg at 05/16/23 2025    sodium chloride flush 0.9 % injection 10 mL  10 mL IntraVENous 2 times per day Nadira Doll PA-C   10 mL at 05/16/23 2025    sodium chloride flush 0.9
persistent leukocytosis  Plan  - hematology evaluation for CLL     5. Aortic root dilation       I will address the patient's cardiac risk factors and adjusted pharmacologic treatment as needed. In addition, I have reinforced the need for patient directed risk factor modification. Tobacco use was discussed with the patient and educated on the negative effects. I have asked the patient to not utilize these agents. Thank you for allowing to us to participate in the abida or Sarah Pratt. Further evaluation will be based upon the patient's clinical course and testing results. All questions and concerns were addressed to the patient/family. Alternatives to my treatment were discussed. The note was completed using EMR. Every effort was made to ensure accuracy; however, inadvertent computerized transcription errors may be present. All questions and concerns were addressed to the patient/family. Alternatives to my treatment were discussed. The note was completed using EMR. Every effort was made to ensure accuracy; however, inadvertent computerized transcription errors may be present.   Lula Varma DO, MD 5/16/2023 5:06 PM

## 2023-05-17 NOTE — PROGRESS NOTES
Physician Progress Note      Daron Amin  CSN #:                  [id number]  :                       1950  ADMIT DATE:       5/15/2023 8:07 PM  [address] [place] DATE:  RESPONDING  PROVIDER #:        Flavia Truong MD          QUERY TEXT:    Patient admitted with CAD with angina pectoris, noted to have paroxysmal   atrial fibrillation and is maintained on Eliquis. If possible, please document   in progress notes and discharge summary if you are evaluating and/or treating   any of the following: The medical record reflects the following:  Risk Factors: 78year-old male, CAD, HTN,  Clinical Indicators:  ED 5/15,  HTN, PAF on a/c    IMPN  A-fib with episodes of SVR    Treatment: IV hydration, cardiology consult, oral Eliquis, lab workup  Options provided:  -- Secondary hypercoagulable state in a patient with atrial fibrillation  -- Other - I will add my own diagnosis  -- Disagree - Not applicable / Not valid  -- Disagree - Clinically unable to determine / Unknown  -- Refer to Clinical Documentation Reviewer    PROVIDER RESPONSE TEXT:    This patient has secondary hypercoagulable state in a patient with atrial   fibrillation. Query created by:  Alyce Crespo on 2023 3:29 PM      Electronically signed by:  Flavia Truong MD 2023 4:03 PM

## 2023-05-17 NOTE — PROCEDURES
Patient:  Reji Kelley   :   1950    Procedural Summary  ~Consent:   Obtained written and verbal consent      Risks/benefits explained in detail  ~Procedure:    Left Heart Catheterization  ~Medications:    Procedural sedation with minimal conscious sedation  ~Complications:   None  ~Blood Loss:    <10cc  ~Specimens:    None obtained  ~Pre-sedation re-evaluation: Performed immediately prior to procedure. Medication and Procedural Reconciliation:  An independent trained observer pushed medications at my direction. We monitored the patient's level of consciousness and vital signs/physiologic status throughout the procedure duration (see start and stop times below). Sedation: 4 mg Versed, 100 mcg Fentanyl  Sedation start: 1108  Sedation stop: 1225    Cardiac Cath PCI:  Anatomy:   LM-distal 40%  LAD-mid 80%  Cx-distal 70% small vessel  OM- nml  RCA-mid 20%, distal stent ISR 10%  RPDA- prox 70%  LVEF- 55%  LVG- mild inferior hypokinesis  LVEDP- 13    FFR LAD 0.74  FFR LM 0.89    Intervention  ~Successful PCI to LAD with 3.5x34 HUI. PD with 3.74x15 NC and 4.0x6 NC. Excellent Result. DFR LM post PCI was 0.98    Contrast: 148  Flouro Time: 9.9  Access: R radial a    Impression  ~Coronary Angiography w/ severe single vessel CAD  ~LVG with LVEF of 55 and inferior regional wall motion abnormalities  ~Successful complex angioplasty and stenting of LAD        Recommendation  ~Aggressive medical treatment and risk factor modification  ~1. Post cath IVF. Bedrest.   2. Recommend ace, high potency statin, eliquis and plavix for 6-12 months. No aspirin due to high bleed risk with triple therapy. No bb due to bradycardia. 3. Referral to outpatient cardiac rehab phase II will be deferred until patient follow-up in office and then determine patient safety and appropriateness to proceed  4. Patient has been advised on the importance of regular exercise of at least 20-30 minutes daily.    5. Patient counseled about and

## 2023-05-17 NOTE — PROGRESS NOTES
V2.0    [facility] Progress Note      Name:  Brando Denton /Age/Sex: 1950  (67 y.o. male)   MRN & CSN:  [id number] & [id number] Encounter Date/Time: 2023 4:43 PM EDT   Location:  [place] [id number] PCP: Angel Carlson MD     [address] [place] [address], [address] [place] [address] Day: 3    Assessment and Recommendations   Brando Denton is a 67 y.o. male with pmh of CAD/MI s/p PCI, obesity, COPD, A-fib on Eliquis, HTN, HLD presented to ED for evaluation of chest pressure radiating into his neck and to his back which began on the day of presentation. Plan:     History of CAD/MI s/p PCI with Chest Pain:  Troponin negative x2. Stress test : Small to moderate size inferior lateral reversible defect suggestive of ischemia. LV EF 56%. On heparin drip, aspirin and statin. Follow-up echocardiogram.  Scheduled for angiogram today. A-fib with episodes of SVR:  Atenolol dose decreased to 50 mg nightly. EP consult pending. HTN: Monitor BP on lisinopril, Aldactone and atenolol. Suspected ERIKA: Scheduled for outpatient sleep study. Chronic leukocytosis: Mild  Remote history of splenectomy. Hematology consult appreciated; follow-up LDH and flow cytometry. Outpatient follow-up with hematology in 2 weeks. Obesity:Body mass index is 36.08 kg/m². BMI Complicating assessment and treatment. Placing patient at risk for multiple co-morbidities as well as early death and contributing to the patient's presentation. Education, and counseling provided.        Diet Diet NPO Exceptions are: Sips of Water with Meds   DVT Prophylaxis [] Lovenox, []  Heparin, [] SCDs, [] Ambulation,  [x] Eliquis, [] Xarelto  [] Coumadin   Code Status Full Code   Disposition From: Home  Expected Disposition: Home  Estimated Date of Discharge: 23  Patient requires continued admission due to ongoing acute issues   Surrogate Decision Maker/ Yulia Savage (Spouse), [phone]     Personally reviewed Lab

## 2023-05-17 NOTE — PROGRESS NOTES
4 Eyes Skin Assessment     NAME:  Roma Vinson  YOB: 1950  MEDICAL RECORD NUMBER:  [id number]    The patient is being assessed for  Other Quarterly assessment    I agree that at least one RN has performed a thorough Head to Toe Skin Assessment on the patient. ALL assessment sites listed below have been assessed. Areas assessed by both nurses:    Head, Face, Ears, Shoulders, Back, Chest, Arms, Elbows, Hands, Sacrum. Buttock, Coccyx, Ischium, Legs. Feet and Heels, and Under Medical Devices         Does the Patient have a Wound?  No noted wound(s)       Nolan Prevention initiated by RN: Yes  Wound Care Orders initiated by RN: No    Pressure Injury (Stage 3,4, Unstageable, DTI, NWPT, and Complex wounds) if present, place Wound referral order by RN under : No    New Ostomies, if present place, Ostomy referral order under : No     Nurse 1 eSignature: Electronically signed by Stefani Cantor RN on 5/17/23 at 10:44 AM EDT    **SHARE this note so that the co-signing nurse can place an eSignature**    Nurse 2 eSignature: Electronically signed by Garrett Jones RN on 5/17/23 at 3:59 PM EDT

## 2023-05-17 NOTE — CARE COORDINATION
05/17/23 1524   IMM Letter   IMM Letter given to Patient/Family/Significant other/Guardian/POA/by: IMM delivered to patient and spouse by CM  (Spouse signed)   IMM Letter date given: 05/17/23   IMM Letter time given: 1925

## 2023-05-17 NOTE — PRE SEDATION
Brief Pre-Op Note/Sedation Assessment      [facility]  1950  [id number]  10:52 AM    Planned Procedure: Cardiac Catheterization Procedure  Post Procedure Plan: Return to same level of care  Consent: I have discussed with the patient and/or the patient representative the indication, alternatives, and the possible risks and/or complications of the planned procedure and the anesthesia methods. The patient and/or patient representative appear to understand and agree to proceed. Chief Complaint:   Chest Pain/Pressure  Anginal Equivalent      Indications for Cath Procedure:  Presentation:  New Onset Angina <= 2 months, Worsening Angina, and Suspected CAD  2. Anginal Classification within 2 weeks:  CCS IV - Inability to perform any activity without angina or angina at rest, i.e., severe limitation  3. Angina Symptoms Assessment:  Typical Chest Pain  4. Heart Failure Class within last 2 weeks:  No symptoms  5. Cardiovascular Instability:  No    Prior Ischemic Workup/Eval:  Pre-Procedural Medications: Yes: Aspirin, Beta Blockers, and STATIN  2. Stress Test Completed? Yes:  Stress or Imaging Studies Performed (within ANY time period):   Type:  Stress Nuclear  Results:  Positive:  Myocardial Perfusion Defects (Nuclear) Extent of Ischemia:  Intermediate    Does Patient need surgery? Cath Valve Surgery:  No    Pre-Procedure Medical History:  Vital Signs:  BP (!) 142/92   Pulse 50   Temp 97.8 °F (36.6 °C) (Oral)   Resp 18   Ht 5' 11\" (1.803 m)   Wt 258 lb 11.2 oz (117.3 kg)   SpO2 96%   BMI 36.08 kg/m²     Allergies:     Allergies   Allergen Reactions    Adhesive Tape Other (See Comments)     \"skin turns red\"     Medications:    Current Facility-Administered Medications   Medication Dose Route Frequency Provider Last Rate Last Admin    perflutren lipid microspheres (DEFINITY) injection 1.5 mL  1.5 mL IntraVENous ONCE PRN Ollie Garcia MD        lisinopril (PRINIVIL;ZESTRIL) tablet 40 mg  40 mg

## 2023-05-18 ENCOUNTER — TELEPHONE (OUTPATIENT)
Dept: FAMILY MEDICINE CLINIC | Age: 73
End: 2023-05-18

## 2023-05-18 ENCOUNTER — TELEPHONE (OUTPATIENT)
Dept: CARDIOLOGY CLINIC | Age: 73
End: 2023-05-18

## 2023-05-18 VITALS
WEIGHT: 257 LBS | HEART RATE: 53 BPM | RESPIRATION RATE: 18 BRPM | BODY MASS INDEX: 35.98 KG/M2 | HEIGHT: 71 IN | DIASTOLIC BLOOD PRESSURE: 87 MMHG | OXYGEN SATURATION: 96 % | SYSTOLIC BLOOD PRESSURE: 152 MMHG | TEMPERATURE: 97.8 F

## 2023-05-18 DIAGNOSIS — K21.9 GASTROESOPHAGEAL REFLUX DISEASE WITHOUT ESOPHAGITIS: ICD-10-CM

## 2023-05-18 PROBLEM — I20.0 UNSTABLE ANGINA PECTORIS (HCC): Status: ACTIVE | Noted: 2023-05-15

## 2023-05-18 LAB
ANION GAP SERPL CALCULATED.3IONS-SCNC: 8 MMOL/L (ref 3–16)
BUN SERPL-MCNC: 13 MG/DL (ref 7–20)
CALCIUM SERPL-MCNC: 9.2 MG/DL (ref 8.3–10.6)
CHLORIDE SERPL-SCNC: 102 MMOL/L (ref 99–110)
CO2 SERPL-SCNC: 26 MMOL/L (ref 21–32)
CREAT SERPL-MCNC: 0.9 MG/DL (ref 0.8–1.3)
DEPRECATED RDW RBC AUTO: 14.4 % (ref 12.4–15.4)
EKG ATRIAL RATE: 61 BPM
EKG DIAGNOSIS: NORMAL
EKG Q-T INTERVAL: 518 MS
EKG QRS DURATION: 96 MS
EKG QTC CALCULATION (BAZETT): 442 MS
EKG R AXIS: 17 DEGREES
EKG T AXIS: 8 DEGREES
EKG VENTRICULAR RATE: 44 BPM
GFR SERPLBLD CREATININE-BSD FMLA CKD-EPI: >60 ML/MIN/{1.73_M2}
GLUCOSE SERPL-MCNC: 93 MG/DL (ref 70–99)
HCT VFR BLD AUTO: 42.8 % (ref 40.5–52.5)
HGB BLD-MCNC: 14.2 G/DL (ref 13.5–17.5)
MCH RBC QN AUTO: 30.2 PG (ref 26–34)
MCHC RBC AUTO-ENTMCNC: 33.1 G/DL (ref 31–36)
MCV RBC AUTO: 91.2 FL (ref 80–100)
PLATELET # BLD AUTO: 271 K/UL (ref 135–450)
PMV BLD AUTO: 8.7 FL (ref 5–10.5)
POTASSIUM SERPL-SCNC: 4.3 MMOL/L (ref 3.5–5.1)
RBC # BLD AUTO: 4.69 M/UL (ref 4.2–5.9)
SODIUM SERPL-SCNC: 136 MMOL/L (ref 136–145)
WBC # BLD AUTO: 12.7 K/UL (ref 4–11)

## 2023-05-18 PROCEDURE — 6370000000 HC RX 637 (ALT 250 FOR IP): Performed by: INTERNAL MEDICINE

## 2023-05-18 PROCEDURE — 99232 SBSQ HOSP IP/OBS MODERATE 35: CPT | Performed by: INTERNAL MEDICINE

## 2023-05-18 PROCEDURE — 6370000000 HC RX 637 (ALT 250 FOR IP): Performed by: PHYSICIAN ASSISTANT

## 2023-05-18 PROCEDURE — 80048 BASIC METABOLIC PNL TOTAL CA: CPT

## 2023-05-18 PROCEDURE — 2580000003 HC RX 258: Performed by: INTERNAL MEDICINE

## 2023-05-18 PROCEDURE — 85027 COMPLETE CBC AUTOMATED: CPT

## 2023-05-18 RX ORDER — CLOPIDOGREL BISULFATE 75 MG/1
75 TABLET ORAL DAILY
Qty: 30 TABLET | Refills: 3 | Status: SHIPPED | OUTPATIENT
Start: 2023-05-19

## 2023-05-18 RX ORDER — LANSOPRAZOLE 30 MG/1
CAPSULE, DELAYED RELEASE ORAL
Qty: 90 CAPSULE | Refills: 3 | Status: SHIPPED | OUTPATIENT
Start: 2023-05-18

## 2023-05-18 RX ORDER — NIFEDIPINE 60 MG/1
60 TABLET, EXTENDED RELEASE ORAL DAILY
Qty: 90 TABLET | Refills: 1 | Status: SHIPPED | OUTPATIENT
Start: 2023-05-18

## 2023-05-18 RX ADMIN — CLOPIDOGREL BISULFATE 75 MG: 75 TABLET ORAL at 09:26

## 2023-05-18 RX ADMIN — LISINOPRIL 40 MG: 20 TABLET ORAL at 09:26

## 2023-05-18 RX ADMIN — APIXABAN 5 MG: 5 TABLET, FILM COATED ORAL at 09:40

## 2023-05-18 RX ADMIN — SODIUM CHLORIDE: 9 INJECTION, SOLUTION INTRAVENOUS at 02:14

## 2023-05-18 RX ADMIN — PANTOPRAZOLE SODIUM 40 MG: 40 TABLET, DELAYED RELEASE ORAL at 05:36

## 2023-05-18 NOTE — TELEPHONE ENCOUNTER
Care Transitions Initial Follow Up Call    Outreach made within 2 business days of discharge: Yes    Patient: Sarah Pratt Patient : 1950   MRN: [id number]  Reason for Admission: There are no discharge diagnoses documented for the most recent discharge. Discharge Date: 23       Spoke with: Yin Duarte    Discharge department/facility: [address] [place] [address] Interactive Patient Contact:  Was patient able to fill all prescriptions: Yes  Was patient instructed to bring all medications to the follow-up visit: Yes  Is patient taking all medications as directed in the discharge summary?  Yes  Does patient understand their discharge instructions: Yes  Does patient have questions or concerns that need addressed prior to 7-14 day follow up office visit: no    Scheduled appointment with PCP within 7-14 days    Follow Up  Future Appointments   Date Time Provider Billy Jack   2023  2:00 PM PONCHO Young - CNP [facility]   2023 10:00 AM MD PRACHI Atkinson - DYLAURIE   2023  7:00 AM [facility]   2023 11:30 AM PONCHO Lewis CNP [facility]   2023  8:15 AM Yodit Birmingham DO [facility]       [place], [place]

## 2023-05-18 NOTE — DISCHARGE INSTRUCTIONS
Radial Angiogram      Care of your puncture site:  Remove clear bandage 24 hours after the procedure. May shower in 24 hours  Inspect the site daily and gently clean using soap and water. Dry thoroughly and apply a Band-Aid. Normal Observations:  Soreness or tenderness which may last one week. Mild oozing from the incision site. Possible bruising that could last 2 weeks. Activity:  You may resume driving 24 hours following the procedure. You may resume normal activity in 3 days or after the wound heals. Avoid lifting more than 10 pounds for 3 days with affected arm. Nutrition:  Regular diet . Drink at least 8 to 10 glasses of decaffeinated, non-alcoholic fluid for the next 24 hours to flush the x-ray dye used for your angiogram out of your body. Call your doctor immediately if your condition worsens, for any other concerns, for a follow-up appointment or if you experience any of the following:  Significant bleeding that does not stop after 10 minutes of applying firm pressure on the puncture site. Increased swelling of the wrist.  Unusual pain, numbness, or tingling of the wrist/arm. Any signs of infection such as: redness, yellow drainage at the site, swelling or pain. Other Instructions:  Hold Metformin or Metformin containing drugs for 48 hours after procedure.

## 2023-05-18 NOTE — PLAN OF CARE
Problem: Discharge Planning  Goal: Discharge to home or other facility with appropriate resources  Outcome: Progressing     Problem: Pain  Goal: Verbalizes/displays adequate comfort level or baseline comfort level  Outcome: Progressing     Problem: Safety - Adult  Goal: Free from fall injury  Outcome: Progressing     Problem: Cardiovascular - Adult  Goal: Maintains optimal cardiac output and hemodynamic stability  Outcome: Progressing  Flowsheets (Taken 5/17/2023 2053)  Maintains optimal cardiac output and hemodynamic stability:   Monitor blood pressure and heart rate   Assess for signs of decreased cardiac output  Note: VSS. Right radial site with previous bruising but now soft, tender, pulse +1, arm guard in place. IVF infusing. Lungs sound clear, pt denies SOB or chest pain at this time, no edema noted. NPO at midnight for possible cardioversion tomorrow. Telemetry. EP and cardiology following. Will continue to monitor.

## 2023-05-18 NOTE — PROGRESS NOTES
[address]   Electrophysiology Consultation     Date: 2023  Reason for Consultation: Atrial fibrillation   Consult Requesting Physician: Ira Brittle, MD     CC: Chest pain, back pain    S:  - Patient remains in atrial fibrillation, on telemetry, heart rates in the 40s to 60s.   - Over the past 6 months or so he has had increasing fatigue and some shortness of breath with exertion  - Remains active, plays golf without limiting symptoms, denies swelling of lower extremities, dizziness or lightheadedness with activity    Physical Examination:  BP (!) 143/96   Pulse (!) 44   Temp 97.7 °F (36.5 °C) (Temporal)   Resp 18   Ht 5' 11\" (1.803 m)   Wt 257 lb (116.6 kg)   SpO2 96%   BMI 35.84 kg/m²   Temp  Av.6 °F (36.4 °C)  Min: 96.8 °F (36 °C)  Max: 98.1 °F (36.7 °C)  Pulse  Av.9  Min: 39  Max: 55  BP  Min: 128/92  Max: 166/95  SpO2  Av.7 %  Min: 94 %  Max: 99 %    Intake/Output Summary (Last 24 hours) at 2023 0810  Last data filed at 2023 0659  Gross per 24 hour   Intake 1753.92 ml   Output 1100 ml   Net 653.92 ml       No acute distress  Chest is clear, nonlabored, no rhonchi or wheeze  Heart is bradycardic, irregular rhythm, no murmurs, no lower extremity swelling  Abdomen is soft and nontender  Strength is grossly preserved, normal gait, normal tone  No focal neurodeficits  Appropriate mood and affect  No rashes, ecchymoses at radial access site     Labs:    Lab Results   Component Value Date/Time     2023 06:00 AM    K 4.3 2023 06:00 AM    K 5.4 05/15/2023 08:56 PM     2023 06:00 AM    CO2 26 2023 06:00 AM    BUN 13 2023 06:00 AM    CREATININE 0.9 2023 06:00 AM    GLUCOSE 93 2023 06:00 AM    CALCIUM 9.2 2023 06:00 AM      Lab Results   Component Value Date    TSH 1.34 2010    TSHREFLEX 1.45 2022    TSHFT4 2.30 2023     Lab Results   Component Value Date/Time    ALKPHOS 94 2023 09:04 AM

## 2023-05-18 NOTE — PROGRESS NOTES
Data- discharge order received, pt verbalized agreement to discharge, disposition to previous residence, no needs for HHC/DME. Action- discharge instructions prepared and given to pt and wife, pt verbalized understanding. Medication information packet given r/t NEW and/or CHANGED prescriptions emphasizing name/purpose/side effects, pt verbalized understanding. Discharge instruction summary: Diet- regular, Activity- per radial angiogram d/c instructions, Primary Care Physician as follows: Christopher Schaeffer -[phone] f/u appointment in 1 week, immunizations reviewed and n/a, prescription medications filled at [facility]. Inpatient surgical procedure precautions reviewed: post radial angiogram dc instructions CHF Education reviewed. Pt/ Family has had a total of 60 minutes CHF education this admission encounter. 1. WEIGHT: Admit Weight - Scale: 260 lb (117.9 kg) (05/15/23 2012)        Today  Weight - Scale: 257 lb (116.6 kg) (05/18/23 0430)       2. O2 SAT.: SpO2: 96 % (05/18/23 0839)    Response- Pt belongings gathered, IV removed. Disposition is home (no HHC/DME needs), transported with wife, taken to lobby via self, refused wc,, no complications.

## 2023-05-18 NOTE — DISCHARGE SUMMARY
V2.0  Discharge Summary    Name:  Roma Vinson /Age/Sex: 1950 (80 y.o. male)   Admit Date: 5/15/2023  Discharge Date: 23    MRN & CSN:  [id number] & [id number] Encounter Date and Time 23 11:52 AM EDT    Attending:  Peg Reza MD Discharging Provider: Peg Reza MD       Hospital Course:     Brief HPI: Roma Vinson is a 67 y.o. male with pmh of CAD/MI s/p PCI, obesity, COPD, A-fib on Eliquis, HTN, HLD presented to ED for evaluation of chest pressure radiating into his neck and to his back which began on the day of presentation. History of CAD/MI s/p PCI with unstable angina:  Troponin negative x2. Stress test : Small to moderate size inferior lateral reversible defect suggestive of ischemia. LV EF 56%. s/p LHC 2023 with Successful PCI to LAD. ECHO 2023: LVEF 50 to 55%. Dilated atria. Moderate MR. Mild TR. Dilated ascending aorta at 4.0 cm. Continue Lipitor, Eliquis and Plavix. No BB due to bradycardia. Outpatient follow-up with cardiology in 2 weeks. A-fib with episodes of SVR:  Atenolol discontinued. Continue Eliquis  EP recommended DCCV in 3-4 weeks. HTN: Discharged on Nifedipine, lisinopril, Aldactone. Suspected ERIKA: Scheduled for outpatient sleep study. Chronic leukocytosis: Mild  Remote history of splenectomy. Flow cytometry pending. Outpatient follow-up with hematology in 2 weeks. Obesity:Body mass index is 36.08 kg/m². BMI Complicating assessment and treatment. Placing patient at risk for multiple co-morbidities as well as early death and contributing to the patient's presentation. Education, and counseling provided. The patient expressed appropriate understanding of, and agreement with the discharge recommendations, medications, and plan.      Consults this admission:  IP CONSULT TO HOSPITALIST  IP CONSULT TO CARDIOLOGY  IP CONSULT TO ONCOLOGY  IP CONSULT TO CARDIAC REHAB  IP CONSULT TO CARDIAC

## 2023-05-18 NOTE — TELEPHONE ENCOUNTER
Patient needs to schedule hospital follow up for next week, but [facility] didn't have anything open. Patient would like a call back on when to schedule. Patient was discharged today.

## 2023-05-18 NOTE — PROGRESS NOTES
[address] Daily Progress Note      Admit Date:  5/15/2023    Chief Complaint   Patient presents with    Hypertension     Pt w c/o HTN (166/110 per home check). Pt stated headache and back pain        Subjective:  Mr. Hanna Ruiz denies exertional chest pain, SOB/MCCARTNEY, PND, palpitations, light-headedness, or edema. Chest pain after PCI has resolved. Concerned about constipation.     Objective:   BP (!) 152/87   Pulse 53   Temp 97.8 °F (36.6 °C) (Temporal)   Resp 18   Ht 5' 11\" (1.803 m)   Wt 257 lb (116.6 kg)   SpO2 96%   BMI 35.84 kg/m²     Intake/Output Summary (Last 24 hours) at 5/18/2023 0947  Last data filed at 5/18/2023 0659  Gross per 24 hour   Intake 1753.92 ml   Output 1100 ml   Net 653.92 ml       TELEMETRY: Atrial fibrillation     Physical Exam:  General:  Awake, alert, oriented x 3, NAD  Skin:  Warm and dry  Neck:  JVD flat  Chest:  normal air entry  Cardiovascular:  irregular RR S1S2, no S3, no mrmr  Abdomen:  Soft, ND, NT, No HSM  Extremities:  No edema    Medications:    apixaban  5 mg Oral BID    atorvastatin  80 mg Oral Daily    sodium chloride flush  5-40 mL IntraVENous 2 times per day    clopidogrel  75 mg Oral Daily    lisinopril  40 mg Oral Daily    cetirizine  10 mg Oral QPM    pantoprazole  40 mg Oral Every Other Day    spironolactone  12.5 mg Oral Nightly    sodium chloride flush  10 mL IntraVENous 2 times per day      sodium chloride      sodium chloride       sodium chloride flush, sodium chloride, acetaminophen, oxyCODONE-acetaminophen **OR** oxyCODONE-acetaminophen, morphine, ondansetron, perflutren lipid microspheres, sodium chloride flush, sodium chloride, [DISCONTINUED] acetaminophen **OR** acetaminophen, senna, nitroGLYCERIN    Lab Data:  CBC:   Recent Labs     05/16/23 0447 05/17/23  2106 05/18/23  0600   WBC 12.8* 13.4* 12.7*   HGB 14.5 14.5 14.2   HCT 44.7 44.9 42.8   MCV 92.8 93.6 91.2    260 271     BMP:   Recent Labs     05/16/23 0447 05/17/23  0904

## 2023-05-19 ENCOUNTER — CARE COORDINATION (OUTPATIENT)
Dept: CASE MANAGEMENT | Age: 73
End: 2023-05-19

## 2023-05-19 NOTE — TELEPHONE ENCOUNTER
Spoke with the patient and got his procedure moved as he had a conflict with previous date. He understands date/time and instructions.      Procedure - CV   Date: 6/13/2023   Arrival time: 8:00 am  Procedure time: 9:00 am     [place] updated / Epic updated / emailed cath lab

## 2023-05-19 NOTE — TELEPHONE ENCOUNTER
Morenita Goldman called for [employer]. He wants to reschedule his procedure, his wife informed him that they will be out of town.     Carlos's callback: [phone]

## 2023-05-19 NOTE — TELEPHONE ENCOUNTER
Spoke with the patient and got him scheduled for procedure. We went over instructions and he verbalized understanding.      Procedure - CV   Date: 6/9/2023   Arrival time: 8:00 am  Procedure time: 9:00 am     [facility]ricel update / Added in [facility] / email cath lab

## 2023-05-22 ENCOUNTER — CARE COORDINATION (OUTPATIENT)
Dept: CASE MANAGEMENT | Age: 73
End: 2023-05-22

## 2023-05-22 NOTE — CARE COORDINATION
Care Transitions Outreach Attempt    Call within 2 business days of discharge: Yes     2nd and final attempt at an initial 24 hour call, contact info left on vm. F/U appts listed below. Patient: Stefani Ocampo Patient : 1950 MRN: [id number]    Last Discharge [address]       Date Complaint Diagnosis Description Type Department Provider    5/15/23 Hypertension Angina pectoris ([address] . .. ED to Hosp-Admission (Discharged) (ADMITTED) Dmitry Woodruff MD; Render Pion. .. Was this an external facility discharge?  No Discharge Facility:     Noted following upcoming appointments from discharge chart review:   [facility] follow up appointment(s):   Future Appointments   Date Time Provider Billy Jack   2023  2:00 PM PONCHO Light CNP FF Cardio [facility]   2023 10:00 AM MD PRACHI Dave FP [place] - DYD   2023  7:00 AM F CT  1 FZ CT [facility]   2023  9:00 AM [facility] PRE/POST ROOM [facility]ATH [facility]   2023 11:30 AM PONCHO Davila CNP FF Cardio [facility]   2023  8:15 AM India Olivares DO FF Cardio CEASAR Valadez RN

## 2023-05-25 ENCOUNTER — OFFICE VISIT (OUTPATIENT)
Dept: CARDIOLOGY CLINIC | Age: 73
End: 2023-05-25

## 2023-05-25 VITALS
OXYGEN SATURATION: 96 % | DIASTOLIC BLOOD PRESSURE: 78 MMHG | HEIGHT: 71 IN | WEIGHT: 254 LBS | BODY MASS INDEX: 35.56 KG/M2 | SYSTOLIC BLOOD PRESSURE: 120 MMHG | HEART RATE: 86 BPM

## 2023-05-25 DIAGNOSIS — I48.91 ATRIAL FIBRILLATION, UNSPECIFIED TYPE (HCC): ICD-10-CM

## 2023-05-25 DIAGNOSIS — I34.0 MITRAL VALVE INSUFFICIENCY, UNSPECIFIED ETIOLOGY: ICD-10-CM

## 2023-05-25 DIAGNOSIS — E78.2 MIXED HYPERLIPIDEMIA: ICD-10-CM

## 2023-05-25 DIAGNOSIS — I10 ESSENTIAL HYPERTENSION: ICD-10-CM

## 2023-05-25 DIAGNOSIS — I25.10 CORONARY ARTERY DISEASE INVOLVING NATIVE HEART WITHOUT ANGINA PECTORIS, UNSPECIFIED VESSEL OR LESION TYPE: Primary | ICD-10-CM

## 2023-05-26 ENCOUNTER — OFFICE VISIT (OUTPATIENT)
Dept: FAMILY MEDICINE CLINIC | Age: 73
End: 2023-05-26

## 2023-05-26 VITALS
DIASTOLIC BLOOD PRESSURE: 60 MMHG | OXYGEN SATURATION: 98 % | WEIGHT: 255 LBS | BODY MASS INDEX: 35.57 KG/M2 | SYSTOLIC BLOOD PRESSURE: 110 MMHG | HEART RATE: 83 BPM

## 2023-05-26 DIAGNOSIS — E78.2 MIXED HYPERLIPIDEMIA: ICD-10-CM

## 2023-05-26 DIAGNOSIS — E66.9 OBESITY (BMI 30.0-34.9): ICD-10-CM

## 2023-05-26 DIAGNOSIS — Z09 HOSPITAL DISCHARGE FOLLOW-UP: Primary | ICD-10-CM

## 2023-05-26 DIAGNOSIS — I25.10 CORONARY ARTERY DISEASE INVOLVING NATIVE CORONARY ARTERY OF NATIVE HEART WITHOUT ANGINA PECTORIS: ICD-10-CM

## 2023-05-26 DIAGNOSIS — I10 PRIMARY HYPERTENSION: ICD-10-CM

## 2023-05-26 DIAGNOSIS — I48.91 ATRIAL FIBRILLATION WITH SLOW VENTRICULAR RESPONSE (HCC): ICD-10-CM

## 2023-05-26 DIAGNOSIS — G47.33 OSA (OBSTRUCTIVE SLEEP APNEA): ICD-10-CM

## 2023-05-26 SDOH — ECONOMIC STABILITY: HOUSING INSECURITY
IN THE LAST 12 MONTHS, WAS THERE A TIME WHEN YOU DID NOT HAVE A STEADY PLACE TO SLEEP OR SLEPT IN A SHELTER (INCLUDING NOW)?: NO

## 2023-05-26 SDOH — ECONOMIC STABILITY: FOOD INSECURITY: WITHIN THE PAST 12 MONTHS, YOU WORRIED THAT YOUR FOOD WOULD RUN OUT BEFORE YOU GOT MONEY TO BUY MORE.: NEVER TRUE

## 2023-05-26 SDOH — ECONOMIC STABILITY: TRANSPORTATION INSECURITY
IN THE PAST 12 MONTHS, HAS LACK OF TRANSPORTATION KEPT YOU FROM MEETINGS, WORK, OR FROM GETTING THINGS NEEDED FOR DAILY LIVING?: NO

## 2023-05-26 SDOH — ECONOMIC STABILITY: FOOD INSECURITY: WITHIN THE PAST 12 MONTHS, THE FOOD YOU BOUGHT JUST DIDN'T LAST AND YOU DIDN'T HAVE MONEY TO GET MORE.: NEVER TRUE

## 2023-05-26 SDOH — ECONOMIC STABILITY: INCOME INSECURITY: HOW HARD IS IT FOR YOU TO PAY FOR THE VERY BASICS LIKE FOOD, HOUSING, MEDICAL CARE, AND HEATING?: NOT HARD AT ALL

## 2023-05-26 NOTE — PROGRESS NOTES
disease    Congestion    Acute rhinosinusitis    Seasonal allergies    Hamstring tightness of both lower extremities    Obesity (BMI 30.0-34. 9)    Chondromalacia patellae of right knee    Chronic pain of right knee    H/O spleen injury: Spleenectomy 1957    S/P right knee arthroscopy, chondroplasty, synovectomy and partial medial meniscectomy 11/04/2016    Hyperlipidemia    Pre-diabetes    Atrial fibrillation with slow ventricular response ([address]    Unstable angina pectoris ([address]       Medications listed as ordered at the time of discharge from hospital     Medication List            Accurate as of May 26, 2023 10:23 AM. If you have any questions, ask your nurse or doctor.                 CONTINUE taking these medications      apixaban 5 MG Tabs tablet  Commonly known as: Eliquis  Take 1 tablet by mouth 2 times daily     atorvastatin 80 MG tablet  Commonly known as: LIPITOR  Take 0.5 tablets by mouth daily     cetirizine 10 MG tablet  Commonly known as: ZYRTEC     clopidogrel 75 MG tablet  Commonly known as: PLAVIX  Take 1 tablet by mouth daily     Coenzyme Q10 400 MG Caps     lansoprazole 30 MG delayed release capsule  Commonly known as: PREVACID  Every day     lisinopril 40 MG tablet  Commonly known as: PRINIVIL;ZESTRIL  Take 1 tablet by mouth daily     NIFEdipine 60 MG extended release tablet  Commonly known as: PROCARDIA XL  Take 1 tablet by mouth daily     spironolactone 25 MG tablet  Commonly known as: ALDACTONE  Take 0.5 tablets by mouth daily     therapeutic multivitamin-minerals tablet               Medications marked \"taking\" at this time  Outpatient Medications Marked as Taking for the 5/26/23 encounter (Office Visit) with Surya Lazo MD   Medication Sig Dispense Refill    clopidogrel (PLAVIX) 75 MG tablet Take 1 tablet by mouth daily 30 tablet 3    NIFEdipine (PROCARDIA XL) 60 MG extended release tablet Take 1 tablet by mouth daily 90 tablet 1    lansoprazole (PREVACID) 30 MG delayed release capsule

## 2023-05-28 ASSESSMENT — ENCOUNTER SYMPTOMS: SHORTNESS OF BREATH: 1

## 2023-06-06 ENCOUNTER — HOSPITAL ENCOUNTER (OUTPATIENT)
Dept: CT IMAGING | Age: 73
Discharge: HOME | End: 2023-06-06
Payer: MEDICARE

## 2023-06-06 DIAGNOSIS — Z87.891 FORMER SMOKER: ICD-10-CM

## 2023-06-06 PROCEDURE — 71271 CT THORAX LUNG CANCER SCR C-: CPT

## 2023-06-07 PROBLEM — I20.0 UNSTABLE ANGINA PECTORIS (HCC): Status: RESOLVED | Noted: 2023-05-15 | Resolved: 2023-06-07

## 2023-06-13 ENCOUNTER — HOSPITAL ENCOUNTER (OUTPATIENT)
Dept: CARDIAC CATH/INVASIVE PROCEDURES | Age: 73
Discharge: HOME | End: 2023-06-13
Attending: INTERNAL MEDICINE | Admitting: INTERNAL MEDICINE
Payer: MEDICARE

## 2023-06-13 VITALS
RESPIRATION RATE: 16 BRPM | WEIGHT: 255 LBS | HEART RATE: 86 BPM | BODY MASS INDEX: 35.7 KG/M2 | DIASTOLIC BLOOD PRESSURE: 91 MMHG | SYSTOLIC BLOOD PRESSURE: 145 MMHG | HEIGHT: 71 IN

## 2023-06-13 PROCEDURE — 92960 CARDIOVERSION ELECTRIC EXT: CPT | Performed by: INTERNAL MEDICINE

## 2023-06-13 PROCEDURE — 92960 CARDIOVERSION ELECTRIC EXT: CPT

## 2023-06-13 PROCEDURE — 93005 ELECTROCARDIOGRAM TRACING: CPT | Performed by: INTERNAL MEDICINE

## 2023-06-13 PROCEDURE — 7100000010 HC PHASE II RECOVERY - FIRST 15 MIN

## 2023-06-13 RX ORDER — SODIUM CHLORIDE 0.9 % (FLUSH) 0.9 %
5-40 SYRINGE (ML) INJECTION PRN
Status: DISCONTINUED | OUTPATIENT
Start: 2023-06-13 | End: 2023-06-13 | Stop reason: HOSPADM

## 2023-06-13 RX ORDER — METOPROLOL SUCCINATE 25 MG/1
25 TABLET, EXTENDED RELEASE ORAL DAILY
Qty: 30 TABLET | Refills: 3 | Status: SHIPPED | OUTPATIENT
Start: 2023-06-13

## 2023-06-13 NOTE — PROCEDURES
[facility]     Electrophysiology Procedure Note       Date of Procedure: 6/13/2023  Patient's Name: Darlene Christensen  YOB: 1950   Medical Record Number: [id number]  Procedure Performed by: Feliz Snow MD    Procedures performed:  External Electrical cardioversion   IV sedation. Start time: 80  Stop time: 900    Medications Used: Brevital    Brevital Sodium: 100 Mg     Indication of the procedure:   Early persistent AF    Details of procedure: The patient was brought to the cath lab area in a fasting and non-sedated state. The risks, benefits and alternatives of the procedure were discussed with the patient. The patient opted to proceed with the procedure. Written informed consent was signed and placed in the chart. A timeout protocol was completed to identify the patient and the procedure being performed. Brevital was given by physician prior to cardioversion     Cardioversion  100 mg Brevital was given by me as two equal and separate doses, and electrical DC cardioversion was perfomred using 200J, synchronized shock. Patient was converted to sinus rhythm. The patient tolerated the procedure well and there were no complications.      Assessment:   Successful external DC cardioversion of atrial fibrillation    Plan:  Continue anticoagulation, uninterrupted for minimum one month post cardioversion  Continue plavix post recent PCI  Start metoprolol 25 XL daily, resting sinus rates in the 80s to 90s  EKG in 2 weeks  Follow up in electrophysiolgy clinic with Dr Jesus Navarrete in 2 month     Feliz Snow MD  [facility]   Office: [phone]  Fax: [phone]

## 2023-06-13 NOTE — H&P
[address]    Electrophysiology Procedure Note       Date of Procedure: 6/13/2023  Patient's Name: Ramirez Gil  YOB: 1950   Medical Record Number: [id number]    Indication:  Early persistent AF  DCCV    Consent:   I have discussed with the patient and/or the patient representative the indication, alternatives, and the possible risks and/or complications of the planned procedure and the anesthesia methods. The patient and/or patient representative appear to understand and agree to proceed. Past Medical History:   Diagnosis Date    ASHD (arteriosclerotic heart disease)     Cancer ([facility]     skin    Cold sore 10/2016    Colon polyp     COPD (chronic obstructive pulmonary disease) ([facility] 2006    NO ISSUES IN LAST 10 YEARS- QUIT SMOKING    History of blood transfusion     Hyperlipidemia     Hypertension     Kidney stone     Medial meniscus tear     RIGHT    MI (myocardial infarction) ([facility] 01/2000    Obesity (BMI 30.0-34.9) 9/22/2016    Peyronie's disease 7/28/2011    PONV (postoperative nausea and vomiting)     Psoriasis     PUD (peptic ulcer disease)     Sinus infection 10/2016    Transfusion of, blood     pt has a history of receiving blood or blood product transfusion(s)       Past Surgical History:   Procedure Laterality Date    ANGIOPLASTY  2000    STENT X1    COLONOSCOPY      several    COLONOSCOPY  01/15/2020    COLONOSCOPY POLYPECTOMY SNARE/COLD BIOPSY performed by Ocie Burkitt, MD at [address] [facility] CATH LAB PROCEDURE  5/17/2023    KIDNEY STONE SURGERY  03/2003    ureteral stone removed    KNEE ARTHROSCOPY  11/04/2016    right knee heladio haddadSimpson    PTCA  1990    stent    SKIN CANCER EXCISION  10/2011    on face  on nose and cheek    SPLENECTOMY  1955    UPPER GASTROINTESTINAL ENDOSCOPY         Prior to Admission medications    Medication Sig Start Date End Date Taking?  Authorizing Provider   clopidogrel (PLAVIX) 75 MG tablet Take 1 tablet by mouth

## 2023-06-14 LAB
EKG ATRIAL RATE: 84 BPM
EKG ATRIAL RATE: 89 BPM
EKG DIAGNOSIS: NORMAL
EKG DIAGNOSIS: NORMAL
EKG P AXIS: 90 DEGREES
EKG P-R INTERVAL: 162 MS
EKG Q-T INTERVAL: 352 MS
EKG Q-T INTERVAL: 380 MS
EKG QRS DURATION: 100 MS
EKG QRS DURATION: 92 MS
EKG QTC CALCULATION (BAZETT): 421 MS
EKG QTC CALCULATION (BAZETT): 449 MS
EKG R AXIS: -17 DEGREES
EKG R AXIS: 13 DEGREES
EKG T AXIS: 0 DEGREES
EKG T AXIS: 6 DEGREES
EKG VENTRICULAR RATE: 84 BPM
EKG VENTRICULAR RATE: 86 BPM

## 2023-06-14 PROCEDURE — 93010 ELECTROCARDIOGRAM REPORT: CPT | Performed by: INTERNAL MEDICINE

## 2023-06-23 ENCOUNTER — TELEPHONE (OUTPATIENT)
Dept: CARDIOLOGY CLINIC | Age: 73
End: 2023-06-23

## 2023-06-23 NOTE — TELEPHONE ENCOUNTER
Pt called is currently scheduled 7/5 and  would like to know if it be possible to get appt next week with [facility]?      Pt call back number   [phone]

## 2023-06-29 NOTE — PROGRESS NOTES
[address]  11/8/22  Referring: Dr. Yeyo Eaton CONSULT/CHIEF COMPLAINT/HPI     Reason for visit/ Chief complaint  Follow up  CAD   HPI Azalee Dance is a 66 y.o.  is here today for regular f/u of his CAD, HTN, hyperlipidemia. He is now fully retired from bus driving. He smoked from a young age until 2009. His daughter had previous stenting, at age 48. Previous ulcer, placed on prevacid. He has since quit drinking. CT of lungs show a stable nodule, with emphysema and cholecystitis. He used to play competitive volleyball. The night before his stent he had played three sets of volleyball, and drank a few beers after. Prior his stenting he had wakened up with severe epigastric pain, associated with diaphoresis. He has not had any of this. Weight has gradually increased. Today, he is here for regular follow up. Overall, he feels fairly well. He denies exertional chest pain, MCCARTNEY/PND, palpitations, light-headedness, edema. Weight stable. Patient is compliant with medications and is tolerating them well without side effects     HISTORY/ALLERGIES/ROS     MedHx:  has a past medical history of ASHD (arteriosclerotic heart disease), Cancer ([address] Cold sore, Colon polyp, COPD (chronic obstructive pulmonary disease) ([address] History of blood transfusion, Hyperlipidemia, Hypertension, Kidney stone, Medial meniscus tear, MI (myocardial infarction) ([address] Obesity (BMI 30.0-34.9), Peyronie's disease, PONV (postoperative nausea and vomiting), Psoriasis, PUD (peptic ulcer disease), Sinus infection, and Transfusion of, blood. SurgHx:  has a past surgical history that includes Splenectomy (1955); Percutaneous Transluminal Coronary Angio (1990); Kidney stone surgery (03/2003); Skin cancer excision (10/2011); angioplasty (2000); Colonoscopy; Upper gastrointestinal endoscopy; Knee arthroscopy (11/04/2016); and Colonoscopy (01/15/2020). SocHx:  reports that he quit smoking about 13 years ago.  His smoking use included cigarettes. He has a 45.00 pack-year smoking history. He has never used smokeless tobacco. He reports that he does not currently use alcohol after a past usage of about 2.0 standard drinks per week. He reports that he does not use drugs. FamHx: No family history of premature coronary artery disease, sudden death, or aneurysm  Allergies: Adhesive tape   ROS:   Review of Systems   Constitutional:  Positive for fatigue. Negative for activity change, diaphoresis and fever. HENT:  Positive for congestion. Negative for ear discharge. Eyes:  Negative for photophobia and visual disturbance. Respiratory:  Negative for cough, chest tightness and shortness of breath. Cardiovascular:  Negative for chest pain and palpitations. Gastrointestinal:  Negative for abdominal distention, abdominal pain, blood in stool and nausea. Endocrine: Negative for cold intolerance and polydipsia. Genitourinary:  Negative for difficulty urinating and flank pain. Musculoskeletal:  Positive for arthralgias and myalgias. Skin:  Negative for rash and wound. Allergic/Immunologic: Negative for environmental allergies and immunocompromised state. Neurological:  Negative for dizziness and headaches. Hematological:  Negative for adenopathy. Does not bruise/bleed easily. Psychiatric/Behavioral:  Negative for confusion. The patient is not hyperactive. MEDICATIONS      Prior to Admission medications    Medication Sig Start Date End Date Taking?  Authorizing Provider   lisinopril (PRINIVIL;ZESTRIL) 40 MG tablet TAKE 1 TABLET BY MOUTH EVERY DAY 10/27/22  Yes Sherren Blender,    hydroCHLOROthiazide (HYDRODIURIL) 25 MG tablet TAKE 1/2 TABLET BY MOUTH IN THE MORNING 10/27/22  Yes Sherren Blender, DO   atenolol (TENORMIN) 100 MG tablet Take 1 tablet by mouth daily 10/27/22  Yes Sherren Blender, DO   atorvastatin (LIPITOR) 80 MG tablet Take 0.5 tablets by mouth daily 10/27/22  Yes Sherren Blender, DO   lansoprazole (PREVACID) 30 MG delayed release capsule TAKE 1 CAPSULE BY MOUTH EVERY OTHER DAY 7/29/22  Yes Katharine Parra MD   diclofenac sodium (VOLTAREN) 1 % GEL Apply 4 g topically 4 times daily as needed for Pain 10/20/21  Yes Teresa Kramer MD   Coenzyme Q10 400 MG CAPS Take by mouth   Yes Historical Provider, MD   cetirizine (ZYRTEC) 10 MG tablet Take 10 mg by mouth every evening    Yes Historical Provider, MD   therapeutic multivitamin-minerals (THERAGRAN-M) tablet Take 1 tablet by mouth daily. Yes Historical Provider, MD   aspirin 81 MG EC tablet Take 81 mg by mouth daily. Yes Historical Provider, MD       PHYSICAL EXAM        Vitals:    11/08/22 1325   BP: 126/74   Pulse: 72   SpO2: 98%    Weight: 261 lb 14.4 oz (118.8 kg)     Gen Alert, cooperative, no distress Heart  Regular rate and rhythm   Head Normocephalic, atraumatic, no abnormalities Abd  Soft, NT, +BS, healed incision    Eyes PERRLA, conj/corn clear Ext  Ext nl, AT, no C/C, trace edema   Nose Nares normal, no drain age, Non-tender Pulse 2+ and symmetric   Throat Lips, mucosa, tongue normal Skin Color/text/turg nl, no rash/lesions   Neck S/S, TM, NT, no bruit Psych Nl mood and affect   Lung CTA-B, unlabored, no DTP     Ch wall NT, no deform       LABS and Imaging     Relevant and available CV data reviewed  Echo/MRI: 11/13/2019  Echo---Normal left ventricle size,moderate wall thickness and normal systolic   function with an estimated ejection fraction of 55-60%. -No regional wall motion abnormalities are seen.   -Normal diastolic function. E/e\"=7.45.  -Mild mitral regurgitation.  -Aortic valve appears sclerotic but opens adequately. -Mild tricuspid regurgitation.   -Estimated pulmonary artery systolic pressure is normal at 28-33 mmHg   assuming a right atrial pressure of 3 mmHg. 11/9/2020 stress echo  No ischemic changes. Non-diagnostic due to failure to reach target heart rate likely secondary to  beta blocker therapy.   Frequent premature atrial contractions. Frequent premature ventricular contractions. -2017  abd doppler-- proximal aorta 2.5 x 2.6  3/24/2021  Chest ct small nodules emphysema incidental gallstones, diffuse CAD  Cath:  IWMI RCA stenting  Holter:  holter monitor  EK NSR, personally interpreted  Stress:   Rest HR: 51 bpm                 HR BP Product: 92008   Rest BP: 134/88 mmHg            Max Exercise: 7 METS   Stress Peak HR: 104 bpm   Stress Peak BP: 145/75 mmHg   Predicted HR: 150 bpm   % of predicted HR: 69   Test Duration: 7 min and 20 sec   Reason for Termination: Fatigue      Results      Echo (rest): Normal (LVEF >50%)      Echo (stress): Hyperkinetic (LVEF >70%)      Echo   Baseline resting echocardiogram shows normal global LV systolic function   with an ejection fraction of 60% and uniform myocardial segmental wall   motion. Following stress there was uniform augmentation of all myocardial   segments with appropriate hyperdynamic LV systolic response to stress. ECG   No ischemic EKG changes. Non-diagnostic due to failure to reach target heart rate likely secondary to   beta blocker therapy. Arrhythmias   Frequent premature atrial contractions. Frequent premature ventricular contractions. Ct of lung --emphysema with stable pulmonary nodules, choleylithiasis  moderate Risk  moderate Complexity/Medical Decision Making  Outside records Reviewed  Labs Reviewed  Prior Imaging, ekg, cath, echo reviewed when available  Medications reviewed  Old Notes reviewed  ASSESSMENT AND PLAN     1. Stable coronary artery disease  -  RCA stenting after IWMI  - on asa 81 mg daily  - recent submaximal stress test without ischemia, achieved 7 mets, likely secondary to betablocker  Plan  - continue aspirin, statin, betablocker  - call for any changes  - he states that he feels good and is not having any symptoms. Continue medical management, patient agrees with plan    2.  Essential Hypertension  - 126/74  - norvasc previously caused edema  - on tenormin 100 mg daily  - on lisinopril 40mg every day  - on hctz 12.5 mg daily  - 10/20/21  k 5.1 na 132  -stable  Plan  - recommend weight loss -- goal <250# at next ov with me    3 Mixed hyperlipidemia  - on lipitor 80 mg daily  - stable, at goal  - 3/11/2021  Tc 134 hdl 47 ldl 59 ldl 142 alt 60 ast 40  Plan  - continue Lipitor  - wellness check per PCP soon (rec lipid check)    4. Congestion  - differential: allergies, gerd, hefpef  Plan:  - obtain chest xray  - try flonase  - follow up with pcp    5. Right hip pain  - new problem  - sounds like osteoarthritis  Plan:  - follow up with PCP    6. Mildly elevated liver enzymes  - likely secondary to frank  - less than 3-5 ULN  Plan:  - weight loss  - okay to continue statin     7. Snores/fatigued  Plan: Recommend sleep apnea eval      8. Obesity  -stable  Plan:  - weight loss advised    Patient counseled on lifestyle modification, diet, and exercise. Follow Up:   6 months    Dr. Nimesh Head attestation: This note was scribed in the presence of Dr. Fitz Galindo by Iesha Slade RN. Solaraze Counseling:  I discussed with the patient the risks of Solaraze including but not limited to erythema, scaling, itching, weeping, crusting, and pain.

## 2023-07-05 ENCOUNTER — OFFICE VISIT (OUTPATIENT)
Dept: CARDIOLOGY CLINIC | Age: 73
End: 2023-07-05
Payer: MEDICARE

## 2023-07-05 ENCOUNTER — TELEPHONE (OUTPATIENT)
Dept: CARDIOLOGY CLINIC | Age: 73
End: 2023-07-05

## 2023-07-05 VITALS
SYSTOLIC BLOOD PRESSURE: 94 MMHG | DIASTOLIC BLOOD PRESSURE: 66 MMHG | WEIGHT: 256.2 LBS | OXYGEN SATURATION: 95 % | BODY MASS INDEX: 35.87 KG/M2 | HEART RATE: 86 BPM | HEIGHT: 71 IN

## 2023-07-05 DIAGNOSIS — E78.2 MIXED HYPERLIPIDEMIA: ICD-10-CM

## 2023-07-05 DIAGNOSIS — I48.91 ATRIAL FIBRILLATION WITH SLOW VENTRICULAR RESPONSE (HCC): Primary | ICD-10-CM

## 2023-07-05 DIAGNOSIS — I10 PRIMARY HYPERTENSION: ICD-10-CM

## 2023-07-05 DIAGNOSIS — E66.9 OBESITY (BMI 30.0-34.9): ICD-10-CM

## 2023-07-05 DIAGNOSIS — I25.10 CORONARY ARTERY DISEASE INVOLVING NATIVE CORONARY ARTERY OF NATIVE HEART WITHOUT ANGINA PECTORIS: ICD-10-CM

## 2023-07-05 PROCEDURE — 1124F ACP DISCUSS-NO DSCNMKR DOCD: CPT | Performed by: NURSE PRACTITIONER

## 2023-07-05 PROCEDURE — 99214 OFFICE O/P EST MOD 30 MIN: CPT | Performed by: NURSE PRACTITIONER

## 2023-07-05 PROCEDURE — 3074F SYST BP LT 130 MM HG: CPT | Performed by: NURSE PRACTITIONER

## 2023-07-05 PROCEDURE — 3078F DIAST BP <80 MM HG: CPT | Performed by: NURSE PRACTITIONER

## 2023-07-05 PROCEDURE — 93000 ELECTROCARDIOGRAM COMPLETE: CPT | Performed by: NURSE PRACTITIONER

## 2023-07-05 RX ORDER — METOPROLOL SUCCINATE 25 MG/1
50 TABLET, EXTENDED RELEASE ORAL DAILY
Qty: 30 TABLET | Refills: 3 | Status: SHIPPED
Start: 2023-07-05 | End: 2023-07-05

## 2023-07-05 RX ORDER — METOPROLOL SUCCINATE 50 MG/1
50 TABLET, EXTENDED RELEASE ORAL DAILY
Qty: 90 TABLET | Refills: 1 | Status: SHIPPED | OUTPATIENT
Start: 2023-07-05

## 2023-07-05 NOTE — TELEPHONE ENCOUNTER
Spoke with the patient and got him scheduled for procedure. We went over instructions below and he verbalized understanding. Procedure - CV   Date: 7/10/2023  Arrival time: 6:45 am   Procedure time: 7:30 am      Patient Instructions  Do not eat or drink after midnight the night prior to procedure [x] Yes [] No     Take eliquis and metoprolol the morning of the procedure.  Hold other meds    Qgenda updated / Added in Epic  / emailed cath lab

## 2023-07-10 ENCOUNTER — HOSPITAL ENCOUNTER (OUTPATIENT)
Dept: CARDIAC CATH/INVASIVE PROCEDURES | Age: 73
Discharge: HOME | End: 2023-07-10
Attending: INTERNAL MEDICINE | Admitting: INTERNAL MEDICINE
Payer: MEDICARE

## 2023-07-10 VITALS
DIASTOLIC BLOOD PRESSURE: 84 MMHG | BODY MASS INDEX: 35.84 KG/M2 | SYSTOLIC BLOOD PRESSURE: 121 MMHG | RESPIRATION RATE: 16 BRPM | HEIGHT: 71 IN | TEMPERATURE: 98.5 F | HEART RATE: 66 BPM | WEIGHT: 256 LBS

## 2023-07-10 LAB
EKG ATRIAL RATE: 375 BPM
EKG ATRIAL RATE: 64 BPM
EKG DIAGNOSIS: NORMAL
EKG DIAGNOSIS: NORMAL
EKG P AXIS: 83 DEGREES
EKG P-R INTERVAL: 174 MS
EKG Q-T INTERVAL: 414 MS
EKG Q-T INTERVAL: 428 MS
EKG QRS DURATION: 100 MS
EKG QRS DURATION: 98 MS
EKG QTC CALCULATION (BAZETT): 434 MS
EKG QTC CALCULATION (BAZETT): 441 MS
EKG R AXIS: -10 DEGREES
EKG R AXIS: -5 DEGREES
EKG T AXIS: -19 DEGREES
EKG T AXIS: 4 DEGREES
EKG VENTRICULAR RATE: 64 BPM
EKG VENTRICULAR RATE: 66 BPM

## 2023-07-10 PROCEDURE — 92960 CARDIOVERSION ELECTRIC EXT: CPT | Performed by: INTERNAL MEDICINE

## 2023-07-10 PROCEDURE — 93005 ELECTROCARDIOGRAM TRACING: CPT | Performed by: INTERNAL MEDICINE

## 2023-07-10 PROCEDURE — 93010 ELECTROCARDIOGRAM REPORT: CPT | Performed by: INTERNAL MEDICINE

## 2023-07-10 PROCEDURE — 92960 CARDIOVERSION ELECTRIC EXT: CPT

## 2023-07-10 PROCEDURE — 99152 MOD SED SAME PHYS/QHP 5/>YRS: CPT | Performed by: INTERNAL MEDICINE

## 2023-07-10 PROCEDURE — 99152 MOD SED SAME PHYS/QHP 5/>YRS: CPT

## 2023-07-10 RX ORDER — SODIUM CHLORIDE 0.9 % (FLUSH) 0.9 %
5-40 SYRINGE (ML) INJECTION PRN
Status: DISCONTINUED | OUTPATIENT
Start: 2023-07-10 | End: 2023-07-10 | Stop reason: HOSPADM

## 2023-07-10 NOTE — H&P
edema  Gastrointestinal: Abdomen soft and round. Bowel sounds normoactive in all quadrants without tenderness or masses. + Obese  Musculoskeletal: Bilateral upper and lower extremity strength 5/5 with full ROM. Neurological/Psych: Awake and orientated to person, place and time. Calm affect, appropriate mood. Pertinent labs, diagnostic, device, and imaging results reviewed as a part of this visit    LABS    CBC:   Lab Results   Component Value Date    WBC 12.7 (H) 2023    HGB 14.2 2023    HCT 42.8 2023    MCV 91.2 2023     2023     BMP:   Lab Results   Component Value Date    CREATININE 0.9 2023    BUN 13 2023     2023    K 4.3 2023     2023    CO2 26 2023     Estimated Creatinine Clearance: 95 mL/min (based on SCr of 0.9 mg/dL). Thyroid:   Lab Results   Component Value Date    TSH 1.34 2010    S6ZWCSH 7.8 2010     Lipid Panel:   Lab Results   Component Value Date/Time    CHOL 116 2023 04:47 AM    HDL 45 2023 04:47 AM    HDL 52 2011 08:50 AM    TRIG 87 2023 04:47 AM     LFTs:  Lab Results   Component Value Date    ALT 32 2023    AST 29 2023    ALKPHOS 94 2023    BILITOT 1.4 (H) 2023     Coags:   Lab Results   Component Value Date    PROTIME 15.0 (H) 05/15/2023    INR 1.18 (H) 05/15/2023    APTT 116.7 (HH) 2023       EC2023 (Personally Interpreted)  -Atrial fibrillation. Rate 84, QRS 98, QTc 406    Echo:    *Left ventricle - normal size, thickness and function EF 50-55%   *Atria - dilated   *Mitral valve - moderate regurgitation   *Tricuspid valve - mild regurgitation with RVSP of 43mmHg   *Ascending aorta - dilated at 4.0cm    GXT:    Myocardial perfusion is abnormal.   There is a small-moderate sizer inferior lateral reversible defect   suggestive of ischemia. Overall LV function is normal with EF=56% and inferior hypokinesis.    Intermediate

## 2023-07-10 NOTE — PROCEDURES
[facility]     Electrophysiology Procedure Note       Date of Procedure: 7/10/2023  Patient's Name: Nain Aleman  YOB: 1950   Medical Record Number: [id number]  Procedure Performed by: Jessica Ley MD    Procedures performed:  IV sedation. External Electrical cardioversion   [id number]  ASA 3    Indication of the procedure: Persistent atrial fibrillation      Details of procedure: The patient was brought to the cath lab area in a fasting and non-sedated state. The risks, benefits and alternatives of the procedure were discussed with the patient. The patient opted to proceed with the procedure. Written informed consent was signed and placed in the chart. A timeout protocol was completed to identify the patient and the procedure being performed. I pushed 50 mg Brevital.    We monitored the patient's level of consciousness and vital signs/physiologic status throughout the procedure duration (see start and stop times below). Sedation:     Sedation start: 0740  Sedation stop: 0800     Patient is on chronic anticoagulation therapy. Then we used Brevital for sedation and electrical DC cardioversion was perfomred using 200J, synchronized shock. Patient was converted to sinus rhythm at 50 bpm. The patient tolerated the procedure well and there were no complications. Conclusion:   Successful external DC cardioversion of atrial fibrillation . Plan:   The patient can be discharged if remains stable. Will continue with medical therapy. Follow-up in office for assessment of recurrence and further management including possibility of ablation.

## 2023-07-10 NOTE — DISCHARGE INSTRUCTIONS
[facility] DISCHARGE INSTRUCTIONS    No driving for 24 hours. We strongly recommend that a responsible adult stay with you for the next 24 hours. Continue Eliquis. Hydrocortisone 1% cream to reddened areas as needed.       Phone: [phone]

## 2023-07-17 ASSESSMENT — ENCOUNTER SYMPTOMS
SHORTNESS OF BREATH: 1
ABDOMINAL DISTENTION: 0
BLOOD IN STOOL: 0
NAUSEA: 0
COUGH: 0
ABDOMINAL PAIN: 0
PHOTOPHOBIA: 0
CHEST TIGHTNESS: 0

## 2023-07-18 ENCOUNTER — OFFICE VISIT (OUTPATIENT)
Dept: CARDIOLOGY CLINIC | Age: 73
End: 2023-07-18

## 2023-07-18 VITALS
HEIGHT: 71 IN | WEIGHT: 256 LBS | SYSTOLIC BLOOD PRESSURE: 120 MMHG | OXYGEN SATURATION: 98 % | HEART RATE: 89 BPM | DIASTOLIC BLOOD PRESSURE: 70 MMHG | BODY MASS INDEX: 35.84 KG/M2

## 2023-07-18 DIAGNOSIS — I48.0 PAROXYSMAL ATRIAL FIBRILLATION (HCC): ICD-10-CM

## 2023-07-18 DIAGNOSIS — R06.83 SNORING: ICD-10-CM

## 2023-07-18 DIAGNOSIS — E78.2 MIXED HYPERLIPIDEMIA: ICD-10-CM

## 2023-07-18 DIAGNOSIS — I10 ESSENTIAL HYPERTENSION: ICD-10-CM

## 2023-07-18 DIAGNOSIS — I25.10 CORONARY ARTERY DISEASE INVOLVING NATIVE CORONARY ARTERY OF NATIVE HEART WITHOUT ANGINA PECTORIS: Primary | ICD-10-CM

## 2023-07-18 RX ORDER — SPIRONOLACTONE 25 MG/1
25 TABLET ORAL DAILY
Qty: 90 TABLET | Refills: 3 | Status: SHIPPED | OUTPATIENT
Start: 2023-07-18

## 2023-07-18 RX ORDER — LISINOPRIL 40 MG/1
40 TABLET ORAL DAILY
Qty: 90 TABLET | Refills: 3 | Status: SHIPPED | OUTPATIENT
Start: 2023-07-18

## 2023-07-18 RX ORDER — METOPROLOL SUCCINATE 50 MG/1
50 TABLET, EXTENDED RELEASE ORAL DAILY
Qty: 90 TABLET | Refills: 3 | Status: SHIPPED | OUTPATIENT
Start: 2023-07-18

## 2023-07-18 RX ORDER — CLOPIDOGREL BISULFATE 75 MG/1
75 TABLET ORAL DAILY
Qty: 90 TABLET | Refills: 3 | Status: SHIPPED | OUTPATIENT
Start: 2023-07-18

## 2023-07-18 RX ORDER — ATORVASTATIN CALCIUM 80 MG/1
40 TABLET, FILM COATED ORAL DAILY
Qty: 45 TABLET | Refills: 3 | Status: SHIPPED | OUTPATIENT
Start: 2023-07-18

## 2023-07-18 NOTE — PATIENT INSTRUCTIONS
Labs today  Stop  nifedipine  Increase spironolactone to 25 mg daily  Take metamucil/fiber/gummies  Refer to cardiac rehab  Refer to Dr Guy Ahmadi for sleep apnea  Call for any changes  Will plan on plavix for 6 months post stenting,but do not stop until discussed with us  Call [facility] with any questions [phone]

## 2023-07-18 NOTE — PROGRESS NOTES
[address] [facility] [address] [address]  7/18/23  Referring: Dr. Quincy Madrid CONSULT/CHIEF COMPLAINT/HPI     Reason for visit/ Chief complaint  Follow up  CAD   HPI Ibis Espinosa is a 67 y.o.  is here today for regular f/u for management of CAD, HTN, hyperlipidemia, gastric ulcer, emphysema,cholecystitis. He is now fully retired from bus driving. Quit smoking in 2009. Prior previous stenting he had epigastric pain with diaphoresis. In the interval since his last visti he had a stnen to his LAd He had two cardioversons to AOn 7/10 he had DCCV of  persistent afib ( [place])    Today, he  has brought a list of questions. Complains of swelling in his ankles that occurs more at night, not seemed to be relieved by elevating legs. Occasional leg cramps that are not exacerbated or relieved by position. Since discharged from the hospital he has had more bloating, gas, and change in bowel habits ( sometimes diarrhea, watery, loose stool). Had colonoscopy within the last 10 years. No chest pain, shortness of breath, palpitations or dizziness. No orthopnea. Snores if he does not lay on his side. Enjoys golf for exercise, but no formal exercise      Patient is compliant with medications and is tolerating them well without side effects     HISTORY/ALLERGIES/ROS     MedHx:  has a past medical history of ASHD (arteriosclerotic heart disease), Cancer ([address]), Cold sore, Colon polyp, COPD (chronic obstructive pulmonary disease) ([address]), History of blood transfusion, Hyperlipidemia, Hypertension, Kidney stone, Medial meniscus tear, MI (myocardial infarction) ([address]), Obesity (BMI 30.0-34.9), Peyronie's disease, PONV (postoperative nausea and vomiting), Psoriasis, PUD (peptic ulcer disease), Sinus infection, and Transfusion of, blood. SurgHx:  has a past surgical history that includes Splenectomy (1955); Percutaneous Transluminal Coronary Angio (1990); Kidney stone surgery (03/2003);  Skin cancer excision (10/2011); angioplasty (2000);

## 2023-07-19 ENCOUNTER — HOSPITAL ENCOUNTER (OUTPATIENT)
Age: 73
Discharge: HOME | End: 2023-07-19
Payer: MEDICARE

## 2023-07-19 DIAGNOSIS — I10 ESSENTIAL HYPERTENSION: ICD-10-CM

## 2023-07-19 DIAGNOSIS — I25.10 CORONARY ARTERY DISEASE INVOLVING NATIVE CORONARY ARTERY OF NATIVE HEART WITHOUT ANGINA PECTORIS: ICD-10-CM

## 2023-07-19 DIAGNOSIS — E78.49 OTHER HYPERLIPIDEMIA: ICD-10-CM

## 2023-07-19 DIAGNOSIS — I48.91 ATRIAL FIBRILLATION, UNSPECIFIED TYPE (HCC): ICD-10-CM

## 2023-07-19 LAB
ALBUMIN SERPL-MCNC: 4.2 G/DL (ref 3.4–5)
ALBUMIN/GLOB SERPL: 1.2 {RATIO} (ref 1.1–2.2)
ALP SERPL-CCNC: 104 U/L (ref 40–129)
ALT SERPL-CCNC: 36 U/L (ref 10–40)
ANION GAP SERPL CALCULATED.3IONS-SCNC: 9 MMOL/L (ref 3–16)
AST SERPL-CCNC: 28 U/L (ref 15–37)
BASOPHILS # BLD: 0 K/UL (ref 0–0.2)
BASOPHILS NFR BLD: 0.5 %
BILIRUB SERPL-MCNC: 0.7 MG/DL (ref 0–1)
BUN SERPL-MCNC: 16 MG/DL (ref 7–20)
CALCIUM SERPL-MCNC: 9.8 MG/DL (ref 8.3–10.6)
CHLORIDE SERPL-SCNC: 101 MMOL/L (ref 99–110)
CHOLEST SERPL-MCNC: 145 MG/DL (ref 0–199)
CO2 SERPL-SCNC: 27 MMOL/L (ref 21–32)
CREAT SERPL-MCNC: 1.1 MG/DL (ref 0.8–1.3)
DEPRECATED RDW RBC AUTO: 14.5 % (ref 12.4–15.4)
EOSINOPHIL # BLD: 0.1 K/UL (ref 0–0.6)
EOSINOPHIL NFR BLD: 1.4 %
GFR SERPLBLD CREATININE-BSD FMLA CKD-EPI: >60 ML/MIN/{1.73_M2}
GLUCOSE SERPL-MCNC: 104 MG/DL (ref 70–99)
HCT VFR BLD AUTO: 45.9 % (ref 40.5–52.5)
HDLC SERPL-MCNC: 51 MG/DL (ref 40–60)
HGB BLD-MCNC: 15.7 G/DL (ref 13.5–17.5)
LDLC SERPL CALC-MCNC: 78 MG/DL
LYMPHOCYTES # BLD: 4.3 K/UL (ref 1–5.1)
LYMPHOCYTES NFR BLD: 45.1 %
MAGNESIUM SERPL-MCNC: 2 MG/DL (ref 1.8–2.4)
MCH RBC QN AUTO: 30.6 PG (ref 26–34)
MCHC RBC AUTO-ENTMCNC: 34.2 G/DL (ref 31–36)
MCV RBC AUTO: 89.5 FL (ref 80–100)
MONOCYTES # BLD: 1.4 K/UL (ref 0–1.3)
MONOCYTES NFR BLD: 14.4 %
NEUTROPHILS # BLD: 3.6 K/UL (ref 1.7–7.7)
NEUTROPHILS NFR BLD: 38.6 %
PLATELET # BLD AUTO: 322 K/UL (ref 135–450)
PMV BLD AUTO: 8.8 FL (ref 5–10.5)
POTASSIUM SERPL-SCNC: 4.8 MMOL/L (ref 3.5–5.1)
PROT SERPL-MCNC: 7.6 G/DL (ref 6.4–8.2)
RBC # BLD AUTO: 5.13 M/UL (ref 4.2–5.9)
SODIUM SERPL-SCNC: 137 MMOL/L (ref 136–145)
TRIGL SERPL-MCNC: 79 MG/DL (ref 0–150)
VLDLC SERPL CALC-MCNC: 16 MG/DL
WBC # BLD AUTO: 9.4 K/UL (ref 4–11)

## 2023-07-19 PROCEDURE — 36415 COLL VENOUS BLD VENIPUNCTURE: CPT

## 2023-07-19 PROCEDURE — 80053 COMPREHEN METABOLIC PANEL: CPT

## 2023-07-19 PROCEDURE — 85025 COMPLETE CBC W/AUTO DIFF WBC: CPT

## 2023-07-19 PROCEDURE — 80061 LIPID PANEL: CPT

## 2023-07-19 PROCEDURE — 83735 ASSAY OF MAGNESIUM: CPT

## 2023-07-25 ENCOUNTER — TELEPHONE (OUTPATIENT)
Dept: CARDIOLOGY CLINIC | Age: 73
End: 2023-07-25

## 2023-07-25 DIAGNOSIS — I25.10 CORONARY ARTERY DISEASE INVOLVING NATIVE CORONARY ARTERY OF NATIVE HEART WITHOUT ANGINA PECTORIS: Primary | ICD-10-CM

## 2023-07-25 NOTE — TELEPHONE ENCOUNTER
Pt's insurance will not accept the afib diagnosis on the referral, can it be updated to pca stent or cad? Z95.5 diagnosis. Please update referral in [facility].

## 2023-07-26 DIAGNOSIS — E78.2 MIXED HYPERLIPIDEMIA: ICD-10-CM

## 2023-07-26 RX ORDER — ATORVASTATIN CALCIUM 80 MG/1
80 TABLET, FILM COATED ORAL DAILY
Qty: 90 TABLET | Refills: 3 | Status: SHIPPED | OUTPATIENT
Start: 2023-07-26

## 2023-07-31 ENCOUNTER — TELEPHONE (OUTPATIENT)
Dept: FAMILY MEDICINE CLINIC | Age: 73
End: 2023-07-31

## 2023-08-18 ENCOUNTER — OFFICE VISIT (OUTPATIENT)
Dept: FAMILY MEDICINE CLINIC | Age: 73
End: 2023-08-18

## 2023-08-18 VITALS
BODY MASS INDEX: 35.7 KG/M2 | WEIGHT: 256 LBS | HEART RATE: 84 BPM | OXYGEN SATURATION: 97 % | DIASTOLIC BLOOD PRESSURE: 85 MMHG | SYSTOLIC BLOOD PRESSURE: 117 MMHG

## 2023-08-18 DIAGNOSIS — R19.8 IRREGULAR BOWEL HABITS: ICD-10-CM

## 2023-08-18 DIAGNOSIS — I10 PRIMARY HYPERTENSION: ICD-10-CM

## 2023-08-18 DIAGNOSIS — E78.2 MIXED HYPERLIPIDEMIA: ICD-10-CM

## 2023-08-18 DIAGNOSIS — E66.9 OBESITY (BMI 30.0-34.9): ICD-10-CM

## 2023-08-18 DIAGNOSIS — I25.10 CORONARY ARTERY DISEASE INVOLVING NATIVE CORONARY ARTERY OF NATIVE HEART WITHOUT ANGINA PECTORIS: ICD-10-CM

## 2023-08-18 DIAGNOSIS — I48.91 ATRIAL FIBRILLATION WITH SLOW VENTRICULAR RESPONSE (HCC): Primary | ICD-10-CM

## 2023-08-18 NOTE — PROGRESS NOTES
Owen Martinez is a 67 y.o. male. HPI:ever since left hospital 5-23 when Antonio Velásquez got a stent has had much gas (flatus), irregular bowels, and mild abd pain  No brbpr, no melena, some loose stools, some diarrhea  Has tried a probiotic and an otc gas med  Meds, vitamins and allergies reviewed with pt    ROS: No TIA's or unusual headaches, no dysphagia. No prolonged cough. No dyspnea or chest pain on exertion. No abdominal pain, change in bowel habits, black or bloody stools. No urinary tract symptoms. No new or unusual musculoskeletal symptoms. Prior to Visit Medications    Medication Sig Taking?  Authorizing Provider   atorvastatin (LIPITOR) 80 MG tablet Take 1 tablet by mouth daily Yes Kylee Ponce, DO   metoprolol succinate (TOPROL XL) 50 MG extended release tablet Take 1 tablet by mouth daily Yes Kylee Ponce DO   clopidogrel (PLAVIX) 75 MG tablet Take 1 tablet by mouth daily Yes Kylee Ponce DO   apixaban (ELIQUIS) 5 MG TABS tablet Take 1 tablet by mouth 2 times daily Yes Kylee Ponce DO   lisinopril (PRINIVIL;ZESTRIL) 40 MG tablet Take 1 tablet by mouth daily Yes Kylee Ponce DO   spironolactone (ALDACTONE) 25 MG tablet Take 1 tablet by mouth daily Yes Kylee Ponce DO   lansoprazole (PREVACID) 30 MG delayed release capsule Every day Yes Kylee Ponce,    Coenzyme Q10 400 MG CAPS Take by mouth Yes Historical Provider, MD   cetirizine (ZYRTEC) 10 MG tablet Take 1 tablet by mouth every evening Yes Historical Provider, MD   therapeutic multivitamin-minerals (THERAGRAN-M) tablet Take 1 tablet by mouth daily Yes Historical Provider, MD       Past Medical History:   Diagnosis Date    ASHD (arteriosclerotic heart disease)     Cancer ([address])     skin    Cold sore 10/2016    Colon polyp     COPD (chronic obstructive pulmonary disease) ([address]) 2006    NO ISSUES IN LAST 10 YEARS- QUIT SMOKING    History of blood transfusion     Hyperlipidemia     Hypertension     Kidney stone     Medial meniscus tear

## 2023-09-28 ENCOUNTER — TELEPHONE (OUTPATIENT)
Dept: CARDIOLOGY CLINIC | Age: 73
End: 2023-09-28

## 2023-09-28 ENCOUNTER — OFFICE VISIT (OUTPATIENT)
Dept: CARDIOLOGY CLINIC | Age: 73
End: 2023-09-28
Payer: MEDICARE

## 2023-09-28 VITALS
SYSTOLIC BLOOD PRESSURE: 110 MMHG | DIASTOLIC BLOOD PRESSURE: 72 MMHG | WEIGHT: 250.5 LBS | HEIGHT: 71 IN | BODY MASS INDEX: 35.07 KG/M2 | OXYGEN SATURATION: 97 % | HEART RATE: 89 BPM

## 2023-09-28 DIAGNOSIS — I25.10 CORONARY ARTERY DISEASE INVOLVING NATIVE CORONARY ARTERY OF NATIVE HEART WITHOUT ANGINA PECTORIS: ICD-10-CM

## 2023-09-28 DIAGNOSIS — I48.91 ATRIAL FIBRILLATION WITH SLOW VENTRICULAR RESPONSE (HCC): Primary | ICD-10-CM

## 2023-09-28 DIAGNOSIS — R00.1 BRADYCARDIA: ICD-10-CM

## 2023-09-28 DIAGNOSIS — I10 PRIMARY HYPERTENSION: ICD-10-CM

## 2023-09-28 PROCEDURE — 99214 OFFICE O/P EST MOD 30 MIN: CPT | Performed by: INTERNAL MEDICINE

## 2023-09-28 PROCEDURE — 93000 ELECTROCARDIOGRAM COMPLETE: CPT | Performed by: INTERNAL MEDICINE

## 2023-09-28 PROCEDURE — 3074F SYST BP LT 130 MM HG: CPT | Performed by: INTERNAL MEDICINE

## 2023-09-28 PROCEDURE — 1124F ACP DISCUSS-NO DSCNMKR DOCD: CPT | Performed by: INTERNAL MEDICINE

## 2023-09-28 PROCEDURE — 3078F DIAST BP <80 MM HG: CPT | Performed by: INTERNAL MEDICINE

## 2023-09-28 NOTE — PROGRESS NOTES
[facility]   Electrophysiology Follow up   Date: 9/28/2023  I had the privilege of visiting Madhuri Villeda in the office. CC: PAF    HPI: Madhuri Villeda is a 67 y.o. male with a past medical history of CAD, HTN, HLD, MR, and AF. Monitor 05/2023     05/15/2023 presented to ED with HTN , back pain, chest pressure. 05/17/2023 [facility] with PCI to LAD     S/p DCCV (6/13/23)    Recurrence noted at office visit 07/05/2023, declined amiodarone, s/p cardioversion 07/10/2023. Interval History: Chiki Ballesteros presents today in follow to cardioversion    He is feeling well. We will continue to monitor. Assessment and plan:   Persistent Atrial fibrillation    EKG today sinus Rhythm    S/p cardioversion  06/13/2023,  07/05/2023   Monitor 05/08/2023 - 100% atrial fibrillation, fastest 109 bpm,    Noted during cath   2003      Continue  toprol 50 mg   Patient has a [id number] Score of 3 ( age, CAD, HTN)     ~ continue Eliquis 5 mg     ~  creatinine 07/19/2023 1.1     ~ h/o upper GI bleed due to asa, continue prevacid    Ablation was discussed at [address] [place] [address] with [facility]PS[facility]    If he has recurrence of atrial fibrillation, I think he will benefit from ablation since he was symptomatic with A-fib. Continue with lifestyle modification. Choices of antiarrhythmics are limited due to CAD,:.  Dofetilide, sotalol, amiodarone.   Bradycardia    Average hear rate 52 bpm, minimum heart rate 31 bpm on recent monitor    Pauses on monitor -mostly nocturnal up to 2.29 seconds     Coronary artery Disease   [facility] w ith PCI to LAD 05/17/2023    RCA stent 2000   Continue  statin, bb, plavix   Follows with Dr. Tomasz Beauchamp     HTN  -Controlled  -BP goal <130/80  -Home BP monitoring encouraged, printed information provided on how to accurately measure BP at home.  -Counseled to follow a low salt diet to assure blood pressure remains controlled for cardiovascular risk factor modification.   -The patient is counseled to get regular exercise 3-5 times per

## 2023-09-28 NOTE — TELEPHONE ENCOUNTER
----- Message from Lottie Blake MD sent at 9/28/2023  3:35 PM EDT -----  Please change his f/u from November to 6 mo with shannan

## 2023-10-06 ENCOUNTER — HOSPITAL ENCOUNTER (OUTPATIENT)
Age: 73
Discharge: HOME | End: 2023-10-06
Payer: MEDICARE

## 2023-10-06 LAB — IGA SERPL-MCNC: 234 MG/DL (ref 70–400)

## 2023-10-06 PROCEDURE — 82784 ASSAY IGA/IGD/IGG/IGM EACH: CPT

## 2023-10-06 PROCEDURE — 83516 IMMUNOASSAY NONANTIBODY: CPT

## 2023-10-06 PROCEDURE — 36415 COLL VENOUS BLD VENIPUNCTURE: CPT

## 2023-10-07 LAB — TISSUE TRANSGLUTAMINASE IGA: <0.5 U/ML (ref 0–14)

## 2023-11-13 ENCOUNTER — PATIENT MESSAGE (OUTPATIENT)
Dept: CARDIOLOGY CLINIC | Age: 73
End: 2023-11-13

## 2023-11-13 RX ORDER — APIXABAN 5 MG/1
5 TABLET, FILM COATED ORAL 2 TIMES DAILY
Qty: 180 TABLET | Refills: 0 | OUTPATIENT
Start: 2023-11-13

## 2023-11-14 NOTE — TELEPHONE ENCOUNTER
From: Libby Miller  To: Dr. Sneha Black: 11/13/2023 4:47 PM EST  Subject: Need refill for Eliquis    Hi,  I'm running low on Eliquis and I had 0 refills left. Kylah Young yesterday to request refills. Today they told me the request had been denied by Dr. Dipak Gotti. Also I'm having eye irritation and I bought some Visine for allergy relief and it said I should check with a doctor if I had heart disease. Is it OK for me to use this?     Thanks,  [employer]

## 2024-01-02 RX ORDER — CARVEDILOL 6.25 MG/1
6.25 TABLET ORAL 2 TIMES DAILY
Qty: 180 TABLET | Refills: 1 | Status: SHIPPED | OUTPATIENT
Start: 2024-01-02

## 2024-01-10 ENCOUNTER — OFFICE VISIT (OUTPATIENT)
Dept: PULMONOLOGY | Age: 74
End: 2024-01-10
Payer: MEDICARE

## 2024-01-10 VITALS
OXYGEN SATURATION: 97 % | WEIGHT: 256.2 LBS | HEIGHT: 71 IN | SYSTOLIC BLOOD PRESSURE: 115 MMHG | HEART RATE: 72 BPM | BODY MASS INDEX: 35.87 KG/M2 | DIASTOLIC BLOOD PRESSURE: 85 MMHG

## 2024-01-10 DIAGNOSIS — I10 PRIMARY HYPERTENSION: ICD-10-CM

## 2024-01-10 DIAGNOSIS — G47.10 HYPERSOMNIA: Primary | ICD-10-CM

## 2024-01-10 DIAGNOSIS — I48.91 ATRIAL FIBRILLATION WITH SLOW VENTRICULAR RESPONSE (HCC): ICD-10-CM

## 2024-01-10 DIAGNOSIS — I25.10 CORONARY ARTERY DISEASE INVOLVING NATIVE CORONARY ARTERY OF NATIVE HEART WITHOUT ANGINA PECTORIS: ICD-10-CM

## 2024-01-10 DIAGNOSIS — E66.01 SEVERE OBESITY (BMI 35.0-39.9) WITH COMORBIDITY (HCC): ICD-10-CM

## 2024-01-10 DIAGNOSIS — R06.83 SNORING: ICD-10-CM

## 2024-01-10 PROCEDURE — 3079F DIAST BP 80-89 MM HG: CPT | Performed by: STUDENT IN AN ORGANIZED HEALTH CARE EDUCATION/TRAINING PROGRAM

## 2024-01-10 PROCEDURE — 99204 OFFICE O/P NEW MOD 45 MIN: CPT | Performed by: STUDENT IN AN ORGANIZED HEALTH CARE EDUCATION/TRAINING PROGRAM

## 2024-01-10 PROCEDURE — 3074F SYST BP LT 130 MM HG: CPT | Performed by: STUDENT IN AN ORGANIZED HEALTH CARE EDUCATION/TRAINING PROGRAM

## 2024-01-10 ASSESSMENT — SLEEP AND FATIGUE QUESTIONNAIRES
HOW LIKELY ARE YOU TO NOD OFF OR FALL ASLEEP WHILE SITTING QUIETLY AFTER LUNCH WITHOUT ALCOHOL: 1
HOW LIKELY ARE YOU TO NOD OFF OR FALL ASLEEP WHILE SITTING AND READING: 2
NECK CIRCUMFERENCE (INCHES): 18
ESS TOTAL SCORE: 10
HOW LIKELY ARE YOU TO NOD OFF OR FALL ASLEEP WHILE SITTING AND TALKING TO SOMEONE: 1
HOW LIKELY ARE YOU TO NOD OFF OR FALL ASLEEP WHILE WATCHING TV: 2
HOW LIKELY ARE YOU TO NOD OFF OR FALL ASLEEP WHILE LYING DOWN TO REST IN THE AFTERNOON WHEN CIRCUMSTANCES PERMIT: 3
HOW LIKELY ARE YOU TO NOD OFF OR FALL ASLEEP WHILE SITTING INACTIVE IN A PUBLIC PLACE: 0
HOW LIKELY ARE YOU TO NOD OFF OR FALL ASLEEP IN A CAR, WHILE STOPPED FOR A FEW MINUTES IN TRAFFIC: 0
HOW LIKELY ARE YOU TO NOD OFF OR FALL ASLEEP WHEN YOU ARE A PASSENGER IN A CAR FOR AN HOUR WITHOUT A BREAK: 1

## 2024-01-10 NOTE — PATIENT INSTRUCTIONS
Patient information on the evaluation procedure for sleep apnea:    1. You are going to have a portable sleep study:  This is a home sleep study that will be done to see if you have obstructive sleep apnea. You will have a flow monitor on your nose, belt on your chest and a oxygen/heart rate monitor on your finger. Please do not wear nail polish on day of the study as it will interfere with the oxygen reading probe that is placed on your finger during the study.   Once you receive the device, you will also receive instructions on how to put it on and turn it on.  After 2 nights you will return it.    2. The sleep office will call you with the results a week after the study.     If the study showed that you have obstructive sleep apnea you will be told of the next step in your care. Commonly the recommended treatment is CPAP Therapy. If you agree to this therapy and you receive your CPAP before your next appointment, please bring it with you to your first follow-up appointment.      Patients who have obstructive sleep apnea on the sleep study and have chosen to try CPAP:  A home equipment company will contact you to set you up with a CPAP machine and fit you for a mask.    Please bring your CPAP, the mask and all supplies including the electrical cord with you to all your first follow up appointments with the sleep physician    Please keep trying to use your CPAP until you have seen in the sleep office in follow up as a lot of the problems patients have with CPAP can be addressed and corrected by your sleep provider.    Sleep center contact information:  [facility]: [phone]  [facility]: [phone]             What are the risk factors for Obstructive Sleep Apnea (ERIKA)?  Obesity  Snoring  Daytime sleepiness  Increasing age  Male gender  Taking sedating medications  Alcohol use  Hypertension  Stroke  Diabetes  Smoking     What is ERIKA?  People with ERIAK experience recurrent

## 2024-01-10 NOTE — PROGRESS NOTES
[facility]  Sleep Medicine  Formerly [facility], later [facility], [address]  [place] [address]    Chief Complaint   Patient presents with    Sleep Apnea       Carlos Salazar is a 73 y.o. male who comes in for sleep evaluation.  His complaints include snoring and previously diagnosed ERIKA. Onset of symptoms was several years ago. Patient was referred here by Cardiology (CAD, a-fib) for evaluation and management of ERIKA. Patient was diagnosed with ERIKA over 30 years ago but he was told that based on the results, treatment was optional. He sleeps on his side.    Pertinent PMHx includes: CAD, aubrey-alonso, hypertension.    He goes to bed at about 9pm. He falls asleep in  few minutes.  He awakens about 2 times per night. He awakens at around 5-6am. He does not use sleep aid/s. He does not take daytime naps. He describes the symptoms as daytime sleepiness, fatigue, snoring, and morning dry mouth.  He does have symptoms that fulfill the criteria for restless legs syndrome but it is just occasionally. He has not dozed off while driving. He denies recent significant weight gain .     Previous evaluation and treatment has included: PSG.    Family hx of sleep disorders: not to patient's knowledge  Caffeinated drinks/day: 2 cups of decaf, occasionally diet pepsi   Alcohol intake/day/week: not much recently   Occupation: retired      DATA REVIEWED TODAY:  [place] & Insomnia Severity Index scores      1/10/2024     9:02 AM   Sleep Medicine   Sitting and reading 2   Watching TV 2   Sitting, inactive in a public place (e.g. a theatre or a meeting) 0   As a passenger in a car for an hour without a break 1   Lying down to rest in the afternoon when circumstances permit 3   Sitting and talking to someone 1   Sitting quietly after a lunch without alcohol 1   In a car, while stopped for a few minutes in traffic 0   [place]field Sleepiness Score 10   Neck circumference (Inches) 18         1/10/2024     9:00 AM   Insomnia Severity Index (COV)

## 2024-01-13 PROBLEM — R06.83 SNORING: Status: ACTIVE | Noted: 2024-01-13

## 2024-01-14 NOTE — PATIENT INSTRUCTIONS
May proceed with colonoscopy,- stop plavix five days prior, start asa 81 mg daily ( remain off of plavix after colonoscopy  - stop eliquis 2 days prior, restart after when ok with GI  Recommend getting checked for H pylori  Cbc cmp mag  Follow up in 6months

## 2024-01-14 NOTE — PROGRESS NOTES
smoking about 59 years ago. He has a 45.0 pack-year smoking history. He has never used smokeless tobacco. He reports current alcohol use of about 2.0 standard drinks of alcohol per week. He reports that he does not use drugs.   FamHx: No family history of premature coronary artery disease, sudden death, or aneurysm  Allergies: Adhesive tape   ROS:   Review of Systems   Constitutional:  Positive for fatigue. Negative for activity change, diaphoresis and fever.   HENT:  Positive for congestion. Negative for ear discharge.    Eyes:  Negative for photophobia and visual disturbance.   Respiratory:  Positive for shortness of breath. Negative for cough and chest tightness.    Cardiovascular:  Negative for chest pain and palpitations.   Gastrointestinal:  Negative for abdominal distention, abdominal pain, blood in stool and nausea.        +bloating    Endocrine: Negative for cold intolerance and polydipsia.   Genitourinary:  Negative for difficulty urinating and flank pain.   Musculoskeletal:  Positive for arthralgias and myalgias.   Skin:  Negative for rash and wound.   Allergic/Immunologic: Negative for environmental allergies and immunocompromised state.   Neurological:  Negative for dizziness and headaches.   Hematological:  Negative for adenopathy. Does not bruise/bleed easily.   Psychiatric/Behavioral:  Negative for confusion. The patient is not hyperactive.         MEDICATIONS      Prior to Admission medications    Medication Sig Start Date End Date Taking? Authorizing Provider   carvedilol (COREG) 6.25 MG tablet Take 1 tablet by mouth 2 times daily 1/2/24  Yes Curt Hutchinson DO   atorvastatin (LIPITOR) 80 MG tablet Take 1 tablet by mouth daily 7/26/23  Yes Curt Hutchinson DO   clopidogrel (PLAVIX) 75 MG tablet Take 1 tablet by mouth daily 7/18/23  Yes Curt Hutchinson DO   apixaban (ELIQUIS) 5 MG TABS tablet Take 1 tablet by mouth 2 times daily 7/18/23  Yes Curt Hutchinson DO   lisinopril (PRINIVIL;ZESTRIL) 40 MG

## 2024-01-15 ENCOUNTER — HOSPITAL ENCOUNTER (OUTPATIENT)
Age: 74
Discharge: HOME | End: 2024-01-15
Payer: MEDICARE

## 2024-01-15 ENCOUNTER — OFFICE VISIT (OUTPATIENT)
Dept: CARDIOLOGY CLINIC | Age: 74
End: 2024-01-15
Payer: MEDICARE

## 2024-01-15 VITALS
SYSTOLIC BLOOD PRESSURE: 130 MMHG | DIASTOLIC BLOOD PRESSURE: 80 MMHG | HEIGHT: 71 IN | BODY MASS INDEX: 35.7 KG/M2 | HEART RATE: 71 BPM | WEIGHT: 255 LBS | OXYGEN SATURATION: 95 %

## 2024-01-15 DIAGNOSIS — R06.83 SNORING: ICD-10-CM

## 2024-01-15 DIAGNOSIS — E78.2 MIXED HYPERLIPIDEMIA: ICD-10-CM

## 2024-01-15 DIAGNOSIS — I10 ESSENTIAL HYPERTENSION: ICD-10-CM

## 2024-01-15 DIAGNOSIS — I48.91 ATRIAL FIBRILLATION WITH SLOW VENTRICULAR RESPONSE (HCC): ICD-10-CM

## 2024-01-15 DIAGNOSIS — I25.10 CORONARY ARTERY DISEASE INVOLVING NATIVE CORONARY ARTERY OF NATIVE HEART WITHOUT ANGINA PECTORIS: ICD-10-CM

## 2024-01-15 DIAGNOSIS — E66.9 OBESITY (BMI 30.0-34.9): ICD-10-CM

## 2024-01-15 DIAGNOSIS — I48.91 ATRIAL FIBRILLATION WITH SLOW VENTRICULAR RESPONSE (HCC): Primary | ICD-10-CM

## 2024-01-15 LAB
ALBUMIN SERPL-MCNC: 4.2 G/DL (ref 3.4–5)
ALBUMIN/GLOB SERPL: 1.6 {RATIO} (ref 1.1–2.2)
ALP SERPL-CCNC: 91 U/L (ref 40–129)
ALT SERPL-CCNC: 44 U/L (ref 10–40)
ANION GAP SERPL CALCULATED.3IONS-SCNC: 10 MMOL/L (ref 3–16)
AST SERPL-CCNC: 30 U/L (ref 15–37)
BASOPHILS # BLD: 0 K/UL (ref 0–0.2)
BASOPHILS NFR BLD: 0.3 %
BILIRUB SERPL-MCNC: 0.6 MG/DL (ref 0–1)
BUN SERPL-MCNC: 14 MG/DL (ref 7–20)
CALCIUM SERPL-MCNC: 9 MG/DL (ref 8.3–10.6)
CHLORIDE SERPL-SCNC: 98 MMOL/L (ref 99–110)
CO2 SERPL-SCNC: 27 MMOL/L (ref 21–32)
CREAT SERPL-MCNC: 0.9 MG/DL (ref 0.8–1.3)
DEPRECATED RDW RBC AUTO: 13.4 % (ref 12.4–15.4)
EOSINOPHIL # BLD: 0.1 K/UL (ref 0–0.6)
EOSINOPHIL NFR BLD: 1.1 %
GFR SERPLBLD CREATININE-BSD FMLA CKD-EPI: >60 ML/MIN/{1.73_M2}
GLUCOSE SERPL-MCNC: 97 MG/DL (ref 70–99)
HCT VFR BLD AUTO: 44.6 % (ref 40.5–52.5)
HGB BLD-MCNC: 14.8 G/DL (ref 13.5–17.5)
LYMPHOCYTES # BLD: 4.7 K/UL (ref 1–5.1)
LYMPHOCYTES NFR BLD: 40.7 %
MAGNESIUM SERPL-MCNC: 2.1 MG/DL (ref 1.8–2.4)
MCH RBC QN AUTO: 30 PG (ref 26–34)
MCHC RBC AUTO-ENTMCNC: 33.2 G/DL (ref 31–36)
MCV RBC AUTO: 90.4 FL (ref 80–100)
MONOCYTES # BLD: 1.4 K/UL (ref 0–1.3)
MONOCYTES NFR BLD: 11.8 %
NEUTROPHILS # BLD: 5.3 K/UL (ref 1.7–7.7)
NEUTROPHILS NFR BLD: 46.1 %
PLATELET # BLD AUTO: 345 K/UL (ref 135–450)
PMV BLD AUTO: 9.2 FL (ref 5–10.5)
POTASSIUM SERPL-SCNC: 4.8 MMOL/L (ref 3.5–5.1)
PROT SERPL-MCNC: 6.9 G/DL (ref 6.4–8.2)
RBC # BLD AUTO: 4.93 M/UL (ref 4.2–5.9)
SODIUM SERPL-SCNC: 135 MMOL/L (ref 136–145)
WBC # BLD AUTO: 11.6 K/UL (ref 4–11)

## 2024-01-15 PROCEDURE — 85025 COMPLETE CBC W/AUTO DIFF WBC: CPT

## 2024-01-15 PROCEDURE — 3079F DIAST BP 80-89 MM HG: CPT | Performed by: INTERNAL MEDICINE

## 2024-01-15 PROCEDURE — 36415 COLL VENOUS BLD VENIPUNCTURE: CPT

## 2024-01-15 PROCEDURE — 80053 COMPREHEN METABOLIC PANEL: CPT

## 2024-01-15 PROCEDURE — 3075F SYST BP GE 130 - 139MM HG: CPT | Performed by: INTERNAL MEDICINE

## 2024-01-15 PROCEDURE — 83735 ASSAY OF MAGNESIUM: CPT

## 2024-01-15 PROCEDURE — 1124F ACP DISCUSS-NO DSCNMKR DOCD: CPT | Performed by: INTERNAL MEDICINE

## 2024-01-15 PROCEDURE — 99214 OFFICE O/P EST MOD 30 MIN: CPT | Performed by: INTERNAL MEDICINE

## 2024-01-16 ENCOUNTER — TELEPHONE (OUTPATIENT)
Dept: CARDIOLOGY CLINIC | Age: 74
End: 2024-01-16

## 2024-01-16 DIAGNOSIS — I25.10 CORONARY ARTERY DISEASE INVOLVING NATIVE CORONARY ARTERY OF NATIVE HEART WITHOUT ANGINA PECTORIS: Primary | ICD-10-CM

## 2024-01-16 NOTE — TELEPHONE ENCOUNTER
----- Message from Padmini Castillo RN sent at 1/16/2024 11:47 AM EST -----  Please let him know wbc count is a little elevated which is not new for him  Sodium is a little low, will repeat in one month

## 2024-01-16 NOTE — RESULT ENCOUNTER NOTE
Please let him know wbc count is a little elevated which is not new for him  Sodium is a little low, will repeat in one month

## 2024-02-05 ENCOUNTER — HOSPITAL ENCOUNTER (OUTPATIENT)
Dept: SLEEP CENTER | Age: 74
Discharge: HOME | End: 2024-02-05
Attending: STUDENT IN AN ORGANIZED HEALTH CARE EDUCATION/TRAINING PROGRAM
Payer: MEDICARE

## 2024-02-05 DIAGNOSIS — G47.10 HYPERSOMNIA: ICD-10-CM

## 2024-02-05 DIAGNOSIS — R06.83 SNORING: ICD-10-CM

## 2024-02-05 DIAGNOSIS — I10 PRIMARY HYPERTENSION: ICD-10-CM

## 2024-02-05 DIAGNOSIS — E66.01 SEVERE OBESITY (BMI 35.0-39.9) WITH COMORBIDITY (HCC): ICD-10-CM

## 2024-02-05 DIAGNOSIS — I48.91 ATRIAL FIBRILLATION WITH SLOW VENTRICULAR RESPONSE (HCC): ICD-10-CM

## 2024-02-05 DIAGNOSIS — I25.10 CORONARY ARTERY DISEASE INVOLVING NATIVE CORONARY ARTERY OF NATIVE HEART WITHOUT ANGINA PECTORIS: ICD-10-CM

## 2024-02-05 PROCEDURE — 95806 SLEEP STUDY UNATT&RESP EFFT: CPT

## 2024-02-07 ENCOUNTER — TELEPHONE (OUTPATIENT)
Dept: PULMONOLOGY | Age: 74
End: 2024-02-07

## 2024-02-08 NOTE — PROGRESS NOTES
Other:__________________________________         Start Order Date: 02/07/24    MEDICAL JUSTIFICATION:  I, the undersigned, certify that the above prescribed supplies are medically necessary for this patient’s wellbeing.  In my opinion, the supplies are both reasonable and necessary in reference to accepted standards of medicalpractice in treatment of this patient’s condition.    John Knox MD    NPI: [id number]       Order Signed Date: 02/07/24      Carlos NATALIE Marie  1950  [address] [place] [id number]  [phone] (home)   [phone] (mobile)    Insurance:   Active Insurance as of 2/7/2024       Primary Coverage       Payor Plan Insurance Group Employer/Plan Group    AETNA MEDICARE AETNA MEDICARE-ADVANTAGE PPO [id number]       Payor Address Payor Phone Number Payor Fax Number Effective Dates    [address]   1/1/2018 - None Entered    [facility] [id number]         Subscriber Name Subscriber Birth Date Member ID       CARLOS AHUMADA NATALIE 1950 [id number]                      Electronically signed by John Knox MD on 2/7/2024 at 8:05 PM.

## 2024-02-08 NOTE — TELEPHONE ENCOUNTER
Please inform patient that his sleep study showed severe sleep apnea and that he stopped breathing or his breathing was inadequate about 32 times for every hour of sleep. His blood oxygen also dropped as low as 81% during the night.     If he agrees I will order a CPAP via a durable medical equipment company of their choice (if no preference, we will go with [facility]) and they will reach out to keep him updated on the process. This will be the company that is going to work with him insurance and provide the CPAP device, along with replacing the mask and other supplies going forward. If they have any questions, they can let you know or message me via MINDBODY. If patient agrees with plan, please route the PAP order to DME company (order already completed on a separate order encounter). Patient should be scheduled for 31 to 90 days follow up.    Thanks  John Knox MD

## 2024-02-09 PROBLEM — G47.10 HYPERSOMNIA: Status: ACTIVE | Noted: 2024-02-09

## 2024-02-14 NOTE — TELEPHONE ENCOUNTER
Patient is agreeable to having order sent to [facility].  Order sent.  Patient scheduled for 31-90 day f/u.  Patient instructed to call office if he does not hear from DME in 2 weeks or if he has any issues using the machine once he gets it.

## 2024-02-19 NOTE — TELEPHONE ENCOUNTER
Wilfredo Pharmacist called from [facility] stating that the Pt was put on Xarelto on 01/15/2024.  Pt wants to go back to take Eliquis 5 mg.      Medication Refill    Medication needing refilled:  Eliquis     Dosage of the medication:   5mg    How are you taking this medication (QD, BID, TID, QID, PRN):  Take 1 tablet by mouth 2 times daily     30 or 90 day supply called in:  180    When will you run out of your medication:    Which Pharmacy are we sending the medication to?:    [id number]  [address] [facility] [address]  Phone: [phone]  Fax: [phone]

## 2024-02-19 NOTE — TELEPHONE ENCOUNTER
Received refill request for eliquis from [facility] pharmacy.    Last ov:01/15/2024 DKW    Last labs:01/15/2024 CBC        Last Refill:07/18/2023    Next appointment:04/17/2024 ESSENCE Villalobos Pharmacist called from [facility] stating that the Pt was put on Xarelto on 01/15/2024.  Pt wants to go back to take Eliquis 5 mg.

## 2024-03-04 DIAGNOSIS — I25.10 CORONARY ARTERY DISEASE INVOLVING NATIVE CORONARY ARTERY OF NATIVE HEART WITHOUT ANGINA PECTORIS: Primary | ICD-10-CM

## 2024-03-05 ENCOUNTER — HOSPITAL ENCOUNTER (OUTPATIENT)
Age: 74
Discharge: HOME | End: 2024-03-05
Payer: MEDICARE

## 2024-03-05 LAB
ALBUMIN SERPL-MCNC: 4 G/DL (ref 3.4–5)
ANION GAP SERPL CALCULATED.3IONS-SCNC: 10 MMOL/L (ref 3–16)
BUN SERPL-MCNC: 18 MG/DL (ref 7–20)
CALCIUM SERPL-MCNC: 9.5 MG/DL (ref 8.3–10.6)
CHLORIDE SERPL-SCNC: 99 MMOL/L (ref 99–110)
CO2 SERPL-SCNC: 26 MMOL/L (ref 21–32)
CREAT SERPL-MCNC: 1.1 MG/DL (ref 0.8–1.3)
GFR SERPLBLD CREATININE-BSD FMLA CKD-EPI: >60 ML/MIN/{1.73_M2}
GLUCOSE SERPL-MCNC: 108 MG/DL (ref 70–99)
PHOSPHATE SERPL-MCNC: 3.5 MG/DL (ref 2.5–4.9)
POTASSIUM SERPL-SCNC: 4.8 MMOL/L (ref 3.5–5.1)
SODIUM SERPL-SCNC: 135 MMOL/L (ref 136–145)

## 2024-03-05 PROCEDURE — 36415 COLL VENOUS BLD VENIPUNCTURE: CPT

## 2024-03-05 PROCEDURE — 80069 RENAL FUNCTION PANEL: CPT

## 2024-03-12 ENCOUNTER — TELEPHONE (OUTPATIENT)
Dept: CARDIOLOGY CLINIC | Age: 74
End: 2024-03-12

## 2024-03-12 DIAGNOSIS — I25.10 CORONARY ARTERY DISEASE INVOLVING NATIVE HEART WITHOUT ANGINA PECTORIS, UNSPECIFIED VESSEL OR LESION TYPE: Primary | ICD-10-CM

## 2024-03-12 NOTE — TELEPHONE ENCOUNTER
----- Message from Padmini Castillo RN sent at 3/12/2024  2:41 PM EDT -----  Dkw reviewed labs, sodium is a little low, recommend he stop hctz if he is taking it , and decrease beer intake if he drinks beer. Will repeat renal panel in one month

## 2024-03-12 NOTE — TELEPHONE ENCOUNTER
Spoke with the patient and advised him of the results below per DKW. Patient voiced understanding. Call complete

## 2024-04-09 ENCOUNTER — HOSPITAL ENCOUNTER (OUTPATIENT)
Age: 74
Discharge: HOME | End: 2024-04-09
Payer: MEDICARE

## 2024-04-09 DIAGNOSIS — I25.10 CORONARY ARTERY DISEASE INVOLVING NATIVE CORONARY ARTERY OF NATIVE HEART WITHOUT ANGINA PECTORIS: Primary | ICD-10-CM

## 2024-04-09 LAB
ALBUMIN SERPL-MCNC: 4 G/DL (ref 3.4–5)
ANION GAP SERPL CALCULATED.3IONS-SCNC: 11 MMOL/L (ref 3–16)
BUN SERPL-MCNC: 12 MG/DL (ref 7–20)
CALCIUM SERPL-MCNC: 9.4 MG/DL (ref 8.3–10.6)
CHLORIDE SERPL-SCNC: 99 MMOL/L (ref 99–110)
CO2 SERPL-SCNC: 25 MMOL/L (ref 21–32)
CREAT SERPL-MCNC: 1.1 MG/DL (ref 0.8–1.3)
GFR SERPLBLD CREATININE-BSD FMLA CKD-EPI: 70 ML/MIN/{1.73_M2}
GLUCOSE SERPL-MCNC: 120 MG/DL (ref 70–99)
PHOSPHATE SERPL-MCNC: 2.7 MG/DL (ref 2.5–4.9)
POTASSIUM SERPL-SCNC: 4.3 MMOL/L (ref 3.5–5.1)
SODIUM SERPL-SCNC: 135 MMOL/L (ref 136–145)

## 2024-04-09 PROCEDURE — 36415 COLL VENOUS BLD VENIPUNCTURE: CPT

## 2024-04-09 PROCEDURE — 80069 RENAL FUNCTION PANEL: CPT

## 2024-04-10 ENCOUNTER — TELEPHONE (OUTPATIENT)
Dept: CARDIOLOGY CLINIC | Age: 74
End: 2024-04-10

## 2024-04-10 DIAGNOSIS — I25.10 CORONARY ARTERY DISEASE INVOLVING NATIVE CORONARY ARTERY OF NATIVE HEART WITHOUT ANGINA PECTORIS: Primary | ICD-10-CM

## 2024-04-10 NOTE — TELEPHONE ENCOUNTER
----- Message from Padmini aCstillo RN sent at 4/10/2024  2:58 PM EDT -----  Please let him know his sodium is still borderline low, recommend he repeat it again prior to office visit in july

## 2024-04-10 NOTE — RESULT ENCOUNTER NOTE
Please let him know his sodium is still borderline low, recommend he repeat it again prior to office visit in july

## 2024-04-10 NOTE — TELEPHONE ENCOUNTER
Called pt inform him of the sodium lab results.     Pt sates at year ago he was in afib and got a cardioversion (07/10/2023), pt stats he has an appointment coming up with [facility] on 05/29/2024 pt is wondering if DKW can check on his afib or does he have to see [facility]? Pt states he would rather see DKW and if he needs a cardioversion he would gladly go back to [facility]. Pt states a Thuuzhart message is preferred but call back if needed.

## 2024-04-12 ENCOUNTER — PATIENT MESSAGE (OUTPATIENT)
Dept: CARDIOLOGY CLINIC | Age: 74
End: 2024-04-12

## 2024-04-12 RX ORDER — SPIRONOLACTONE 25 MG/1
12.5 TABLET ORAL DAILY
Qty: 90 TABLET | Refills: 3
Start: 2024-04-12

## 2024-04-12 NOTE — TELEPHONE ENCOUNTER
From: Carlos Salazar  To: Dr. Curt Hutchinson  Sent: 4/12/2024 2:12 PM EDT  Subject: Instructions after 4/9/2024 Renal Panel    Per phone call, I was told to stop drinking beer and to stop taking hydrochlorothiazide then to repeat the blood test in a month. I don't drink beer and I haven't taken HCT for quite a while. However, I do take spironolactone. Should I stop taking spironolactone.    Thanks,  Carlos

## 2024-04-21 ASSESSMENT — SLEEP AND FATIGUE QUESTIONNAIRES
HOW LIKELY ARE YOU TO NOD OFF OR FALL ASLEEP WHILE SITTING AND TALKING TO SOMEONE: WOULD NEVER DOZE
HOW LIKELY ARE YOU TO NOD OFF OR FALL ASLEEP WHEN YOU ARE A PASSENGER IN A CAR FOR AN HOUR WITHOUT A BREAK: WOULD NEVER DOZE
HOW LIKELY ARE YOU TO NOD OFF OR FALL ASLEEP WHEN YOU ARE A PASSENGER IN A CAR FOR AN HOUR WITHOUT A BREAK: WOULD NEVER DOZE
HOW LIKELY ARE YOU TO NOD OFF OR FALL ASLEEP WHILE SITTING QUIETLY AFTER LUNCH WITHOUT ALCOHOL: SLIGHT CHANCE OF DOZING
HOW LIKELY ARE YOU TO NOD OFF OR FALL ASLEEP IN A CAR, WHILE STOPPED FOR A FEW MINUTES IN TRAFFIC: WOULD NEVER DOZE
HOW LIKELY ARE YOU TO NOD OFF OR FALL ASLEEP WHILE WATCHING TV: MODERATE CHANCE OF DOZING
HOW LIKELY ARE YOU TO NOD OFF OR FALL ASLEEP WHILE SITTING INACTIVE IN A PUBLIC PLACE: WOULD NEVER DOZE
HOW LIKELY ARE YOU TO NOD OFF OR FALL ASLEEP WHILE SITTING QUIETLY AFTER LUNCH WITHOUT ALCOHOL: SLIGHT CHANCE OF DOZING
HOW LIKELY ARE YOU TO NOD OFF OR FALL ASLEEP WHILE LYING DOWN TO REST IN THE AFTERNOON WHEN CIRCUMSTANCES PERMIT: WOULD NEVER DOZE
HOW LIKELY ARE YOU TO NOD OFF OR FALL ASLEEP WHILE SITTING AND READING: SLIGHT CHANCE OF DOZING
HOW LIKELY ARE YOU TO NOD OFF OR FALL ASLEEP IN A CAR, WHILE STOPPED FOR A FEW MINUTES IN TRAFFIC: WOULD NEVER DOZE
HOW LIKELY ARE YOU TO NOD OFF OR FALL ASLEEP WHILE SITTING INACTIVE IN A PUBLIC PLACE: WOULD NEVER DOZE
HOW LIKELY ARE YOU TO NOD OFF OR FALL ASLEEP WHILE LYING DOWN TO REST IN THE AFTERNOON WHEN CIRCUMSTANCES PERMIT: WOULD NEVER DOZE
HOW LIKELY ARE YOU TO NOD OFF OR FALL ASLEEP WHILE WATCHING TV: MODERATE CHANCE OF DOZING
ESS TOTAL SCORE: 4
HOW LIKELY ARE YOU TO NOD OFF OR FALL ASLEEP WHILE SITTING AND READING: SLIGHT CHANCE OF DOZING
HOW LIKELY ARE YOU TO NOD OFF OR FALL ASLEEP WHILE SITTING AND TALKING TO SOMEONE: WOULD NEVER DOZE

## 2024-04-24 ENCOUNTER — OFFICE VISIT (OUTPATIENT)
Dept: PULMONOLOGY | Age: 74
End: 2024-04-24
Payer: MEDICARE

## 2024-04-24 VITALS
SYSTOLIC BLOOD PRESSURE: 120 MMHG | DIASTOLIC BLOOD PRESSURE: 78 MMHG | WEIGHT: 260.2 LBS | BODY MASS INDEX: 36.43 KG/M2 | HEIGHT: 71 IN | OXYGEN SATURATION: 98 % | HEART RATE: 78 BPM

## 2024-04-24 DIAGNOSIS — G47.33 OSA ON CPAP: Primary | ICD-10-CM

## 2024-04-24 DIAGNOSIS — E66.01 SEVERE OBESITY (BMI 35.0-39.9) WITH COMORBIDITY (HCC): ICD-10-CM

## 2024-04-24 DIAGNOSIS — I10 ESSENTIAL HYPERTENSION: ICD-10-CM

## 2024-04-24 DIAGNOSIS — I48.91 ATRIAL FIBRILLATION, UNSPECIFIED TYPE (HCC): ICD-10-CM

## 2024-04-24 PROCEDURE — 3074F SYST BP LT 130 MM HG: CPT | Performed by: STUDENT IN AN ORGANIZED HEALTH CARE EDUCATION/TRAINING PROGRAM

## 2024-04-24 PROCEDURE — 3078F DIAST BP <80 MM HG: CPT | Performed by: STUDENT IN AN ORGANIZED HEALTH CARE EDUCATION/TRAINING PROGRAM

## 2024-04-24 PROCEDURE — 99214 OFFICE O/P EST MOD 30 MIN: CPT | Performed by: STUDENT IN AN ORGANIZED HEALTH CARE EDUCATION/TRAINING PROGRAM

## 2024-04-24 PROCEDURE — 1124F ACP DISCUSS-NO DSCNMKR DOCD: CPT | Performed by: STUDENT IN AN ORGANIZED HEALTH CARE EDUCATION/TRAINING PROGRAM

## 2024-04-24 NOTE — PATIENT INSTRUCTIONS
ERIKA education:    You have obstructive sleep apnea (ERIKA):    1. Obstructive sleep apnea (ERIKA) is a condition where the upper airway narrows or closes intermittently during sleep. This can lead to drops in your oxygen levels during sleep, arousals from sleep, and excessive daytime sleepiness.     2. Untreated ERIKA is associated with increased risk of hypertension, cardiac disease, myocardial infarction, stroke, and poor blood sugar control. Treating your ERIKA can decrease these risks.    3. Weight loss does improve sleep apnea. It is important to have a healthy diet and an exercise program.     4. Alcohol and sedating medicine can make ERIKA worse and should be avoided in particular before sleep.    5. If you have surgery or are hospitalized, tell your doctor that you have ERIKA and bring your CPAP to the hospital.    6. If you are drowsy or sleepy, you should not drive. If you are driving and become drowsy or sleepy, you should pull over to a safe place. Below are our drowsy driving tips.     PAP machine care:   You should clean your PAP regularly (see your PAP  site for more details)  Daily cleaning   1. Wipe mask with a damp towel with mild detergent, rinse with a damp towel and let air dry. You can also see CPAP cleaning wipes. Avoid harsh cleaning wipes or chemicals.    2. Humidifier- empty water daily. Fill with clean distilled water before use before sleep.    Weekly Cleaning   1. Clean mask, headgear, and tubing weekly  2. Fill your sink with warm water and a few drops of mild dish soap. Swirl equipment for at least 5 minutes. Rinse well and air dry. Hang hose over something so the water droplets drip out.   3. Clean filter- rinse with warm water, squeeze excess water and blot dry with towel. If there is a white filter (respironics machine)- do not wash that - it is disposable and should be changed once a month.   4. Humidifier- Disinfect in solution that is one part vinegar and 5 parts water for 30

## 2024-04-24 NOTE — PROGRESS NOTES
breathing.  Hypertension: controlled. Continue present management with lisinopril, carvedilol, spironolactone.   Obesity/overweight BMI: Weight loss is associated with improvement in sleep disordered breathing. Carlos Salazar should exercise regularly and watch his diet.  Return in about 1 year (around 4/24/2025) for ERIKA follow up.  He is to contact me if he has any questions prior to that time.      John Knox MD   Sleep Medicine    4/24/24    This dictation was generated by voice recognition computer software.  Although all attempts are made to edit the dictation for accuracy, there may be errors in the transcription that are not intended.

## 2024-05-17 ENCOUNTER — TELEPHONE (OUTPATIENT)
Dept: CARDIOLOGY CLINIC | Age: 74
End: 2024-05-17

## 2024-05-17 DIAGNOSIS — I25.10 CORONARY ARTERY DISEASE INVOLVING NATIVE CORONARY ARTERY OF NATIVE HEART WITHOUT ANGINA PECTORIS: Primary | ICD-10-CM

## 2024-05-17 NOTE — TELEPHONE ENCOUNTER
September 29, 2020      [facility]  [address] [facility]  [place] [address]  Phone: [phone]  Fax: [phone]       Patient: Ashleigh Kraft   YOB: 2021  Date of Visit: 2020    To Whom It May Concern:    Ann Krfat  was at [facility] on 2020. She may return to work/school on 2020 with no restrictions. If you have any questions or concerns, or if I can be of further assistance, please do not hesitate to contact me.    Sincerely,      Roopa Ott MA      Pt states he was to get a recheck on  his renal panel in May. I don't see order. Pt would like to have that done today.  Please call pt to advise.

## 2024-05-23 ENCOUNTER — HOSPITAL ENCOUNTER (OUTPATIENT)
Age: 74
Discharge: HOME | End: 2024-05-23
Payer: MEDICARE

## 2024-05-23 DIAGNOSIS — I25.10 CORONARY ARTERY DISEASE INVOLVING NATIVE CORONARY ARTERY OF NATIVE HEART WITHOUT ANGINA PECTORIS: ICD-10-CM

## 2024-05-23 LAB
ALBUMIN SERPL-MCNC: 3.7 G/DL (ref 3.4–5)
ANION GAP SERPL CALCULATED.3IONS-SCNC: 11 MMOL/L (ref 3–16)
BUN SERPL-MCNC: 13 MG/DL (ref 7–20)
CALCIUM SERPL-MCNC: 9.4 MG/DL (ref 8.3–10.6)
CHLORIDE SERPL-SCNC: 100 MMOL/L (ref 99–110)
CO2 SERPL-SCNC: 26 MMOL/L (ref 21–32)
CREAT SERPL-MCNC: 0.9 MG/DL (ref 0.8–1.3)
GFR SERPLBLD CREATININE-BSD FMLA CKD-EPI: 89 ML/MIN/{1.73_M2}
GLUCOSE SERPL-MCNC: 107 MG/DL (ref 70–99)
PHOSPHATE SERPL-MCNC: 3.1 MG/DL (ref 2.5–4.9)
POTASSIUM SERPL-SCNC: 4.1 MMOL/L (ref 3.5–5.1)
SODIUM SERPL-SCNC: 137 MMOL/L (ref 136–145)

## 2024-05-23 PROCEDURE — 36415 COLL VENOUS BLD VENIPUNCTURE: CPT

## 2024-05-23 PROCEDURE — 80069 RENAL FUNCTION PANEL: CPT

## 2024-05-24 ENCOUNTER — TELEPHONE (OUTPATIENT)
Dept: CARDIOLOGY CLINIC | Age: 74
End: 2024-05-24

## 2024-05-24 NOTE — TELEPHONE ENCOUNTER
----- Message from Curt Hutchinson DO sent at 5/24/2024  1:05 PM EDT -----  Please let patient know that his renal panel was stable. Sodium was improved.

## 2024-05-29 ENCOUNTER — OFFICE VISIT (OUTPATIENT)
Dept: CARDIOLOGY CLINIC | Age: 74
End: 2024-05-29
Payer: MEDICARE

## 2024-05-29 VITALS
BODY MASS INDEX: 36.06 KG/M2 | HEIGHT: 71 IN | DIASTOLIC BLOOD PRESSURE: 74 MMHG | WEIGHT: 257.6 LBS | OXYGEN SATURATION: 95 % | SYSTOLIC BLOOD PRESSURE: 118 MMHG | HEART RATE: 50 BPM

## 2024-05-29 DIAGNOSIS — I25.10 CORONARY ARTERY DISEASE INVOLVING NATIVE CORONARY ARTERY OF NATIVE HEART WITHOUT ANGINA PECTORIS: ICD-10-CM

## 2024-05-29 DIAGNOSIS — I50.33 ACUTE ON CHRONIC DIASTOLIC (CONGESTIVE) HEART FAILURE (HCC): ICD-10-CM

## 2024-05-29 DIAGNOSIS — R00.1 BRADYCARDIA: ICD-10-CM

## 2024-05-29 DIAGNOSIS — I20.9 ANGINA PECTORIS, UNSPECIFIED (HCC): ICD-10-CM

## 2024-05-29 DIAGNOSIS — I48.91 ATRIAL FIBRILLATION WITH SLOW VENTRICULAR RESPONSE (HCC): Primary | ICD-10-CM

## 2024-05-29 PROCEDURE — 99214 OFFICE O/P EST MOD 30 MIN: CPT | Performed by: INTERNAL MEDICINE

## 2024-05-29 PROCEDURE — 1124F ACP DISCUSS-NO DSCNMKR DOCD: CPT | Performed by: INTERNAL MEDICINE

## 2024-05-29 PROCEDURE — 3074F SYST BP LT 130 MM HG: CPT | Performed by: INTERNAL MEDICINE

## 2024-05-29 PROCEDURE — 3078F DIAST BP <80 MM HG: CPT | Performed by: INTERNAL MEDICINE

## 2024-05-29 PROCEDURE — 93000 ELECTROCARDIOGRAM COMPLETE: CPT | Performed by: INTERNAL MEDICINE

## 2024-05-29 NOTE — PATIENT INSTRUCTIONS
[employer] can help to monitor for atrial fib.  Call if you flip into it.     No medication changes.    Call the office for any new, worsening, or concerning symptoms.

## 2024-05-29 NOTE — PROGRESS NOTES
[facility]   Electrophysiology Follow up   Date: 5/29/2024  I had the privilege of visiting Carlos Salazar in the office.     CC: PAF    HPI: Carlos Salazar is a 73 y.o. male with a past medical history of CAD, HTN, HLD, MR, and AF.     Monitor 05/2023     05/15/2023 presented to ED with HTN , back pain, chest pressure. 05/17/2023 [facility] with PCI to LAD     S/p DCCV (6/13/23)    Recurrence noted at office visit 07/05/2023, declined amiodarone, s/p cardioversion 07/10/2023.    Interval History: Carlos presents today in follow to cardioversion. Overall, he feels well and has no complaints of dizziness or palpitations. We will continue to monitor.    Assessment and plan:    Atrial fibrillation, post cardioversion   EKG today sinus rhythm    S/p cardioversion  06/13/2023,  07/05/2023   Monitor 05/08/2023 - 100% atrial fibrillation, fastest 109 bpm,    Noted during cath  2003      Continue  toprol 50 mg   Patient has a [id number] Score of 3 ( age, CAD, HTN)     ~ continue Eliquis 5 mg     ~  creatinine 07/19/2023 1.1     ~ h/o upper GI bleed due to asa, continue prevacid    Ablation was discussed at  with [facility]    If he has recurrence of atrial fibrillation, I think he will benefit from ablation since he was symptomatic with A-fib.    Continue with lifestyle modification.  Choices of antiarrhythmics are limited due to CAD,:.  Dofetilide, sotalol, amiodarone.    Bradycardia    HR 50 today   Average hear rate 52 bpm, minimum heart rate 31 bpm on recent monitor    Pauses on monitor -mostly nocturnal up to 2.29 seconds     Coronary artery Disease   [facility] w ith PCI to LAD 05/17/2023    RCA stent 2000   Continue  statin, bb, plavix   Follows with Dr. Hutchinson     HTN  -Controlled 118/74  -BP goal <130/80  -Home BP monitoring encouraged, printed information provided on how to accurately measure BP at home.  -Counseled to follow a low salt diet to assure blood pressure remains controlled for cardiovascular risk factor

## 2024-06-24 RX ORDER — CARVEDILOL 6.25 MG/1
6.25 TABLET ORAL 2 TIMES DAILY
Qty: 180 TABLET | Refills: 2 | Status: SHIPPED | OUTPATIENT
Start: 2024-06-24

## 2024-06-24 NOTE — TELEPHONE ENCOUNTER
Requested Prescriptions     Pending Prescriptions Disp Refills    carvedilol (COREG) 6.25 MG tablet [Pharmacy Med Name: Carvedilol Oral Tablet 6.25 MG] 180 tablet 0     Sig: Take 1 tablet by mouth 2 times daily      [id number]     Last OV:  5/29/2024 [facility]    Next OV: 7/23/2024 DKW    Last Labs: 01/02/2024 DKW    Last Filled: 01/02/2024 DKW

## 2024-07-08 DIAGNOSIS — K21.9 GASTROESOPHAGEAL REFLUX DISEASE WITHOUT ESOPHAGITIS: ICD-10-CM

## 2024-07-08 DIAGNOSIS — I10 ESSENTIAL HYPERTENSION: ICD-10-CM

## 2024-07-08 SDOH — ECONOMIC STABILITY: FOOD INSECURITY: WITHIN THE PAST 12 MONTHS, YOU WORRIED THAT YOUR FOOD WOULD RUN OUT BEFORE YOU GOT MONEY TO BUY MORE.: NEVER TRUE

## 2024-07-08 SDOH — HEALTH STABILITY: PHYSICAL HEALTH: ON AVERAGE, HOW MANY DAYS PER WEEK DO YOU ENGAGE IN MODERATE TO STRENUOUS EXERCISE (LIKE A BRISK WALK)?: 2 DAYS

## 2024-07-08 SDOH — ECONOMIC STABILITY: FOOD INSECURITY: WITHIN THE PAST 12 MONTHS, THE FOOD YOU BOUGHT JUST DIDN'T LAST AND YOU DIDN'T HAVE MONEY TO GET MORE.: NEVER TRUE

## 2024-07-08 SDOH — ECONOMIC STABILITY: INCOME INSECURITY: HOW HARD IS IT FOR YOU TO PAY FOR THE VERY BASICS LIKE FOOD, HOUSING, MEDICAL CARE, AND HEATING?: NOT HARD AT ALL

## 2024-07-08 SDOH — HEALTH STABILITY: PHYSICAL HEALTH: ON AVERAGE, HOW MANY MINUTES DO YOU ENGAGE IN EXERCISE AT THIS LEVEL?: 30 MIN

## 2024-07-08 ASSESSMENT — LIFESTYLE VARIABLES
HOW OFTEN DO YOU HAVE A DRINK CONTAINING ALCOHOL: MONTHLY OR LESS
HOW OFTEN DO YOU HAVE A DRINK CONTAINING ALCOHOL: 2
HOW MANY STANDARD DRINKS CONTAINING ALCOHOL DO YOU HAVE ON A TYPICAL DAY: 1
HOW MANY STANDARD DRINKS CONTAINING ALCOHOL DO YOU HAVE ON A TYPICAL DAY: 1 OR 2
HOW OFTEN DO YOU HAVE SIX OR MORE DRINKS ON ONE OCCASION: 1

## 2024-07-08 ASSESSMENT — PATIENT HEALTH QUESTIONNAIRE - PHQ9
SUM OF ALL RESPONSES TO PHQ QUESTIONS 1-9: 2
2. FEELING DOWN, DEPRESSED OR HOPELESS: SEVERAL DAYS
SUM OF ALL RESPONSES TO PHQ9 QUESTIONS 1 & 2: 2
1. LITTLE INTEREST OR PLEASURE IN DOING THINGS: SEVERAL DAYS

## 2024-07-08 NOTE — TELEPHONE ENCOUNTER
Received refill request for  lisinopril (PRINIVIL;ZESTRIL) 40 MG tablet  from [facility] pharmacy.     Last OV: 4/17/24    Next OV: 7/23/24    Last Labs: 1/15/24 cmp    Last Filled: 7/18/23

## 2024-07-08 NOTE — TELEPHONE ENCOUNTER
Received refill request for lansoprazole (PREVACID) 30 MG delayed release capsule  from [facility] pharmacy.     Last OV: 4/17/24    Next OV: 7/23/24    Last Labs: none    Last Filled: 5/18/24

## 2024-07-09 RX ORDER — LANSOPRAZOLE 30 MG/1
CAPSULE, DELAYED RELEASE ORAL
Qty: 90 CAPSULE | Refills: 0 | Status: SHIPPED | OUTPATIENT
Start: 2024-07-09

## 2024-07-09 RX ORDER — LISINOPRIL 40 MG/1
40 TABLET ORAL DAILY
Qty: 90 TABLET | Refills: 2 | Status: SHIPPED | OUTPATIENT
Start: 2024-07-09

## 2024-07-11 ENCOUNTER — OFFICE VISIT (OUTPATIENT)
Dept: FAMILY MEDICINE CLINIC | Age: 74
End: 2024-07-11

## 2024-07-11 VITALS
OXYGEN SATURATION: 96 % | SYSTOLIC BLOOD PRESSURE: 105 MMHG | DIASTOLIC BLOOD PRESSURE: 74 MMHG | HEART RATE: 76 BPM | WEIGHT: 251 LBS | BODY MASS INDEX: 35.02 KG/M2

## 2024-07-11 DIAGNOSIS — M25.511 CHRONIC RIGHT SHOULDER PAIN: ICD-10-CM

## 2024-07-11 DIAGNOSIS — G89.29 CHRONIC RIGHT SHOULDER PAIN: ICD-10-CM

## 2024-07-11 DIAGNOSIS — R53.83 FATIGUE, UNSPECIFIED TYPE: ICD-10-CM

## 2024-07-11 DIAGNOSIS — Z12.5 PROSTATE CANCER SCREENING: ICD-10-CM

## 2024-07-11 DIAGNOSIS — I25.10 CORONARY ARTERY DISEASE INVOLVING NATIVE CORONARY ARTERY OF NATIVE HEART WITHOUT ANGINA PECTORIS: ICD-10-CM

## 2024-07-11 DIAGNOSIS — Z00.00 MEDICARE ANNUAL WELLNESS VISIT, SUBSEQUENT: Primary | ICD-10-CM

## 2024-07-11 DIAGNOSIS — E66.01 SEVERE OBESITY (BMI 35.0-39.9) WITH COMORBIDITY (HCC): ICD-10-CM

## 2024-07-11 DIAGNOSIS — I50.33 ACUTE ON CHRONIC DIASTOLIC (CONGESTIVE) HEART FAILURE (HCC): ICD-10-CM

## 2024-07-11 DIAGNOSIS — I10 ESSENTIAL HYPERTENSION: ICD-10-CM

## 2024-07-11 DIAGNOSIS — G47.33 OSA ON CPAP: ICD-10-CM

## 2024-07-11 DIAGNOSIS — E78.2 MIXED HYPERLIPIDEMIA: ICD-10-CM

## 2024-07-11 DIAGNOSIS — I48.91 ATRIAL FIBRILLATION WITH SLOW VENTRICULAR RESPONSE (HCC): ICD-10-CM

## 2024-07-11 RX ORDER — SILDENAFIL 100 MG/1
100 TABLET, FILM COATED ORAL PRN
Qty: 10 TABLET | Refills: 3 | Status: SHIPPED | OUTPATIENT
Start: 2024-07-11

## 2024-07-11 NOTE — PROGRESS NOTES
DO Curt   Coenzyme Q10 400 MG CAPS Take by mouth Yes Provider, MD Srinivasa   cetirizine (ZYRTEC) 10 MG tablet Take 1 tablet by mouth every evening Yes Provider, MD Srinivasa   therapeutic multivitamin-minerals (THERAGRAN-M) tablet Take 1 tablet by mouth daily Yes Provider, MD Ignacio Bernabe (Including outside providers/suppliers regularly involved in providing care):   Patient Care Team:  Red Munoz MD as PCP - General  Red Munoz MD as PCP - Empaneled Provider  Evangelina (Cardiology)  [place] - Gastro  Attari  - ElectorCardio  [place] - Derm  Harsha - Dentist  Julio - Sleep  [facility] - norman toney   Reviewed and updated this visit:  Allergies  Meds          Encounter Diagnoses   Name Primary?    Medicare annual wellness visit, subsequent Yes    Atrial fibrillation with slow ventricular response (HCC)     Severe obesity (BMI 35.0-39.9) with comorbidity (HCC)     Acute on chronic diastolic (congestive) heart failure (HCC)     Coronary artery disease involving native coronary artery of native heart without angina pectoris     Essential hypertension     Mixed hyperlipidemia     ERIKA on CPAP     ?gynecomastia from aldactone  Right shoulder tendonitis     Labs  See urol prn  Refill viagra  Urge shingrix at pharm  Encourage more exercise  Refer ortho  Stop aldactone and observe

## 2024-07-11 NOTE — PATIENT INSTRUCTIONS
of the week.     Try to quit or cut back on using tobacco and other nicotine products. This includes smoking and vaping. If you need help quitting, talk to your doctor about stop-smoking programs and medicines. These can increase your chances of quitting for good. Quitting is one of the most important things you can do to protect your heart. It is never too late to quit. Try to avoid secondhand smoke too.     Stay at a weight that's healthy for you. Talk to your doctor if you need help losing weight.     Try to get 7 to 9 hours of sleep each night.     Limit alcohol to 2 drinks a day for men and 1 drink a day for women. Too much alcohol can cause health problems.     Manage other health problems such as diabetes, high blood pressure, and high cholesterol. If you think you may have a problem with alcohol or drug use, talk to your doctor.   Medicines    Take your medicines exactly as prescribed. Call your doctor if you think you are having a problem with your medicine.     If your doctor recommends aspirin, take the amount directed each day. Make sure you take aspirin and not another kind of pain reliever, such as acetaminophen (Tylenol).   When should you call for help?   Call 911 if you have symptoms of a heart attack. These may include:    Chest pain or pressure, or a strange feeling in the chest.     Sweating.     Shortness of breath.     Pain, pressure, or a strange feeling in the back, neck, jaw, or upper belly or in one or both shoulders or arms.     Lightheadedness or sudden weakness.     A fast or irregular heartbeat.   After you call 911, the  may tell you to chew 1 adult-strength or 2 to 4 low-dose aspirin. Wait for an ambulance. Do not try to drive yourself.  Watch closely for changes in your health, and be sure to contact your doctor if you have any problems.  Where can you learn more?  Go to https://www.healthwise.net/patientEd and enter F075 to learn more about \"A Healthy Heart: Care

## 2024-07-12 ENCOUNTER — CARE COORDINATION (OUTPATIENT)
Dept: CARE COORDINATION | Age: 74
End: 2024-07-12

## 2024-07-12 NOTE — CARE COORDINATION
Ambulatory Care Coordination Note     7/12/2024 4:03 PM     ACM made care coordination outreach to patient. Patient informed of the role of ACM and care management and declined any needs at this time. Patient kept conversation brief. Patient informed ACM that he is doing \"well.\" Denied any chest pain, shortness of breath or swelling. Discussed RPM and declined due to having the equipment and monitoring as needed. Declined any issues with appetite or bowels. Reported all medications are filled and he is taking as prescribed. Denied any issues with low-sodium diet. Patient stated no other questions or concerns at the end of the call. Patient provided with [facility] phone number to call for any non-emergent questions or future care coordination needs.      PCP/Specialist follow up:   Future Appointments         Provider Specialty Dept Phone    7/23/2024 7:45 AM Curt Hutchinson DO Cardiology [phone]    4/30/2025 8:20 AM John Knox MD Pulmonology [phone]

## 2024-07-22 DIAGNOSIS — R53.83 FATIGUE, UNSPECIFIED TYPE: ICD-10-CM

## 2024-07-22 DIAGNOSIS — E78.2 MIXED HYPERLIPIDEMIA: ICD-10-CM

## 2024-07-22 DIAGNOSIS — Z12.5 PROSTATE CANCER SCREENING: ICD-10-CM

## 2024-07-22 DIAGNOSIS — I10 ESSENTIAL HYPERTENSION: ICD-10-CM

## 2024-07-22 LAB
ALBUMIN SERPL-MCNC: 4 G/DL (ref 3.4–5)
ALBUMIN/GLOB SERPL: 1.4 {RATIO} (ref 1.1–2.2)
ALP SERPL-CCNC: 96 U/L (ref 40–129)
ALT SERPL-CCNC: 42 U/L (ref 10–40)
ANION GAP SERPL CALCULATED.3IONS-SCNC: 11 MMOL/L (ref 3–16)
AST SERPL-CCNC: 27 U/L (ref 15–37)
BASOPHILS # BLD: 0 K/UL (ref 0–0.2)
BASOPHILS NFR BLD: 0.2 %
BILIRUB SERPL-MCNC: 0.4 MG/DL (ref 0–1)
BUN SERPL-MCNC: 13 MG/DL (ref 7–20)
CALCIUM SERPL-MCNC: 9.7 MG/DL (ref 8.3–10.6)
CHLORIDE SERPL-SCNC: 103 MMOL/L (ref 99–110)
CHOLEST SERPL-MCNC: 126 MG/DL (ref 0–199)
CO2 SERPL-SCNC: 27 MMOL/L (ref 21–32)
CREAT SERPL-MCNC: 0.9 MG/DL (ref 0.8–1.3)
DEPRECATED RDW RBC AUTO: 14 % (ref 12.4–15.4)
EOSINOPHIL # BLD: 0.2 K/UL (ref 0–0.6)
EOSINOPHIL NFR BLD: 1.7 %
GFR SERPLBLD CREATININE-BSD FMLA CKD-EPI: 89 ML/MIN/{1.73_M2}
GLUCOSE P FAST SERPL-MCNC: 110 MG/DL (ref 70–99)
HCT VFR BLD AUTO: 44.4 % (ref 40.5–52.5)
HDLC SERPL-MCNC: 50 MG/DL (ref 40–60)
HGB BLD-MCNC: 14.5 G/DL (ref 13.5–17.5)
LDL CHOLESTEROL: 51 MG/DL
LYMPHOCYTES # BLD: 4.9 K/UL (ref 1–5.1)
LYMPHOCYTES NFR BLD: 46.4 %
MCH RBC QN AUTO: 29.7 PG (ref 26–34)
MCHC RBC AUTO-ENTMCNC: 32.8 G/DL (ref 31–36)
MCV RBC AUTO: 90.7 FL (ref 80–100)
MONOCYTES # BLD: 1.3 K/UL (ref 0–1.3)
MONOCYTES NFR BLD: 11.9 %
NEUTROPHILS # BLD: 4.2 K/UL (ref 1.7–7.7)
NEUTROPHILS NFR BLD: 39.8 %
PLATELET # BLD AUTO: 315 K/UL (ref 135–450)
PMV BLD AUTO: 9.3 FL (ref 5–10.5)
POTASSIUM SERPL-SCNC: 4.7 MMOL/L (ref 3.5–5.1)
PROT SERPL-MCNC: 6.8 G/DL (ref 6.4–8.2)
PSA SERPL DL<=0.01 NG/ML-MCNC: 0.74 NG/ML (ref 0–4)
RBC # BLD AUTO: 4.89 M/UL (ref 4.2–5.9)
SODIUM SERPL-SCNC: 141 MMOL/L (ref 136–145)
TRIGL SERPL-MCNC: 124 MG/DL (ref 0–150)
TSH SERPL DL<=0.005 MIU/L-ACNC: 1.47 UIU/ML (ref 0.27–4.2)
VLDLC SERPL CALC-MCNC: 25 MG/DL
WBC # BLD AUTO: 10.6 K/UL (ref 4–11)

## 2024-07-22 ASSESSMENT — ENCOUNTER SYMPTOMS
COUGH: 0
SHORTNESS OF BREATH: 1
CHEST TIGHTNESS: 0
PHOTOPHOBIA: 0
ABDOMINAL DISTENTION: 0
BLOOD IN STOOL: 0
NAUSEA: 0
ABDOMINAL PAIN: 0

## 2024-07-22 NOTE — PROGRESS NOTES
will discuss with pcp, ?fatty liver      Patient counseled on lifestyle modification, diet, and exercise.    Follow Up:   6 months    Dr. Eduardo Hutchinson DO  Cardiologist [facility]    Scribe's attestation: This note was scribed in the presence of Dr. Hutchinson by Padmini Castillo RN

## 2024-07-23 ENCOUNTER — OFFICE VISIT (OUTPATIENT)
Dept: CARDIOLOGY CLINIC | Age: 74
End: 2024-07-23
Payer: MEDICARE

## 2024-07-23 VITALS
SYSTOLIC BLOOD PRESSURE: 142 MMHG | HEIGHT: 71 IN | HEART RATE: 71 BPM | BODY MASS INDEX: 35.7 KG/M2 | OXYGEN SATURATION: 95 % | DIASTOLIC BLOOD PRESSURE: 92 MMHG | WEIGHT: 255 LBS

## 2024-07-23 DIAGNOSIS — E78.2 MIXED HYPERLIPIDEMIA: ICD-10-CM

## 2024-07-23 DIAGNOSIS — I10 ESSENTIAL HYPERTENSION: ICD-10-CM

## 2024-07-23 DIAGNOSIS — I48.91 ATRIAL FIBRILLATION WITH SLOW VENTRICULAR RESPONSE (HCC): Primary | ICD-10-CM

## 2024-07-23 DIAGNOSIS — I25.10 CORONARY ARTERY DISEASE INVOLVING NATIVE CORONARY ARTERY OF NATIVE HEART WITHOUT ANGINA PECTORIS: ICD-10-CM

## 2024-07-23 DIAGNOSIS — R06.83 SNORING: ICD-10-CM

## 2024-07-23 DIAGNOSIS — E66.9 OBESITY (BMI 30.0-34.9): ICD-10-CM

## 2024-07-23 PROCEDURE — 3079F DIAST BP 80-89 MM HG: CPT | Performed by: INTERNAL MEDICINE

## 2024-07-23 PROCEDURE — 3077F SYST BP >= 140 MM HG: CPT | Performed by: INTERNAL MEDICINE

## 2024-07-23 PROCEDURE — 99214 OFFICE O/P EST MOD 30 MIN: CPT | Performed by: INTERNAL MEDICINE

## 2024-07-23 PROCEDURE — 1124F ACP DISCUSS-NO DSCNMKR DOCD: CPT | Performed by: INTERNAL MEDICINE

## 2024-07-23 RX ORDER — LISINOPRIL 40 MG/1
40 TABLET ORAL DAILY
Qty: 90 TABLET | Refills: 2 | Status: SHIPPED | OUTPATIENT
Start: 2024-07-23

## 2024-07-23 RX ORDER — ATORVASTATIN CALCIUM 80 MG/1
80 TABLET, FILM COATED ORAL DAILY
Qty: 90 TABLET | Refills: 3 | Status: SHIPPED | OUTPATIENT
Start: 2024-07-23

## 2024-07-23 RX ORDER — CARVEDILOL 6.25 MG/1
6.25 TABLET ORAL 2 TIMES DAILY
Qty: 180 TABLET | Refills: 3 | Status: SHIPPED | OUTPATIENT
Start: 2024-07-23

## 2024-07-23 NOTE — PATIENT INSTRUCTIONS
150minutes of exercise  Continue all medications   Check blood pressure three times a week and call us in 2 months      How to take a blood pressure by the American Heart Association   - don't smoke drink caffeinated beverages or exercise within 30 minutes before measuring your blood pressure. Empty your bladder and ensure at least 5 minutes of quiet rest before measurement.   - sit with you back straight and supported ( on a dining chair, rather than a sofa) your feet should be flat on the floor and your legs should not be crossed. Our arm should be supported on a flat surface (such as a table) with the upper arm at heart level. Make sure the bottom of the cuff is placed directly above the bend of the elbow.   - don't take the measurements over clothes.    Date             Blood Pressure          Comment                                                 Date             Blood Pressure          Comment      ----------          -------------------          ---------------------------------                         ----------          -------------------          ---------------------------------        ----------          -------------------          ---------------------------------                         ----------          -------------------          ---------------------------------        ----------          -------------------          ---------------------------------                         ----------          -------------------          ---------------------------------        ----------          -------------------          ---------------------------------                        ----------          -------------------          ---------------------------------        ----------          -------------------          ---------------------------------                        ----------          -------------------          ---------------------------------      Call or send via My Chart above readings on

## 2024-07-28 SDOH — HEALTH STABILITY: PHYSICAL HEALTH: ON AVERAGE, HOW MANY DAYS PER WEEK DO YOU ENGAGE IN MODERATE TO STRENUOUS EXERCISE (LIKE A BRISK WALK)?: 0 DAYS

## 2024-07-30 ENCOUNTER — OFFICE VISIT (OUTPATIENT)
Dept: ORTHOPEDIC SURGERY | Age: 74
End: 2024-07-30
Payer: MEDICARE

## 2024-07-30 VITALS — WEIGHT: 254 LBS | HEIGHT: 70 IN | BODY MASS INDEX: 36.36 KG/M2

## 2024-07-30 DIAGNOSIS — M25.511 ACUTE PAIN OF RIGHT SHOULDER: ICD-10-CM

## 2024-07-30 DIAGNOSIS — M67.911 TENDINOPATHY OF RIGHT ROTATOR CUFF: Primary | ICD-10-CM

## 2024-07-30 PROCEDURE — 99203 OFFICE O/P NEW LOW 30 MIN: CPT | Performed by: ORTHOPAEDIC SURGERY

## 2024-07-30 PROCEDURE — 1124F ACP DISCUSS-NO DSCNMKR DOCD: CPT | Performed by: ORTHOPAEDIC SURGERY

## 2024-07-30 PROCEDURE — 20610 DRAIN/INJ JOINT/BURSA W/O US: CPT | Performed by: ORTHOPAEDIC SURGERY

## 2024-07-30 RX ORDER — TRIAMCINOLONE ACETONIDE 40 MG/ML
40 INJECTION, SUSPENSION INTRA-ARTICULAR; INTRAMUSCULAR ONCE
Status: COMPLETED | OUTPATIENT
Start: 2024-07-30 | End: 2024-07-30

## 2024-07-30 RX ORDER — BUPIVACAINE HYDROCHLORIDE 2.5 MG/ML
4 INJECTION, SOLUTION INFILTRATION; PERINEURAL ONCE
Status: COMPLETED | OUTPATIENT
Start: 2024-07-30 | End: 2024-07-30

## 2024-07-30 RX ORDER — LIDOCAINE HYDROCHLORIDE 10 MG/ML
4 INJECTION, SOLUTION INFILTRATION; PERINEURAL ONCE
Status: COMPLETED | OUTPATIENT
Start: 2024-07-30 | End: 2024-07-30

## 2024-07-30 RX ADMIN — TRIAMCINOLONE ACETONIDE 40 MG: 40 INJECTION, SUSPENSION INTRA-ARTICULAR; INTRAMUSCULAR at 11:09

## 2024-07-30 RX ADMIN — BUPIVACAINE HYDROCHLORIDE 10 MG: 2.5 INJECTION, SOLUTION INFILTRATION; PERINEURAL at 11:09

## 2024-07-30 RX ADMIN — LIDOCAINE HYDROCHLORIDE 4 ML: 10 INJECTION, SOLUTION INFILTRATION; PERINEURAL at 11:09

## 2024-08-19 NOTE — TELEPHONE ENCOUNTER
----- Message from CUBA sent at 7/31/2023  8:09 AM EDT -----  Subject: Message to Provider    QUESTIONS  Information for Provider? Patient is requesting a call back to schedule an   appointment to address digestion issues he has had since being in hospital   in May. He states he has diarrhea and constipation and stomach cramps. He   states he saw cardiologist who adjusted some of his heart medications but   the issue still persists. ---------------------------------------------------------------------------  --------------  Sally KERNS  [phone]; OK to leave message on voicemail  ---------------------------------------------------------------------------  --------------  SCRIPT ANSWERS  Relationship to Patient? Self  Do you have pain that has started or worsened within the past 24 hours? No  Are you vomiting blood or have bloody or black stool? No  Are you having fevers (100.4), chills, or sweats? No  Have you recently (14 days) seen a provider for this pain?  Yes Alternate Care Site

## 2024-08-20 ENCOUNTER — TELEPHONE (OUTPATIENT)
Dept: CARDIOLOGY CLINIC | Age: 74
End: 2024-08-20

## 2024-08-20 DIAGNOSIS — I10 ESSENTIAL HYPERTENSION: Primary | ICD-10-CM

## 2024-08-20 DIAGNOSIS — I25.10 CORONARY ARTERY DISEASE INVOLVING NATIVE CORONARY ARTERY OF NATIVE HEART WITHOUT ANGINA PECTORIS: Primary | ICD-10-CM

## 2024-08-20 RX ORDER — FUROSEMIDE 20 MG/1
20 TABLET ORAL DAILY
Qty: 90 TABLET | Refills: 3 | Status: SHIPPED | OUTPATIENT
Start: 2024-08-20

## 2024-09-12 ENCOUNTER — HOSPITAL ENCOUNTER (OUTPATIENT)
Age: 74
Discharge: HOME | End: 2024-09-12
Payer: MEDICARE

## 2024-09-12 DIAGNOSIS — I10 ESSENTIAL HYPERTENSION: ICD-10-CM

## 2024-09-12 LAB
ALBUMIN SERPL-MCNC: 4 G/DL (ref 3.4–5)
ANION GAP SERPL CALCULATED.3IONS-SCNC: 12 MMOL/L (ref 3–16)
BUN SERPL-MCNC: 14 MG/DL (ref 7–20)
CALCIUM SERPL-MCNC: 10.1 MG/DL (ref 8.3–10.6)
CHLORIDE SERPL-SCNC: 98 MMOL/L (ref 99–110)
CO2 SERPL-SCNC: 28 MMOL/L (ref 21–32)
CREAT SERPL-MCNC: 1.1 MG/DL (ref 0.8–1.3)
GFR SERPLBLD CREATININE-BSD FMLA CKD-EPI: 70 ML/MIN/{1.73_M2}
GLUCOSE SERPL-MCNC: 99 MG/DL (ref 70–99)
PHOSPHATE SERPL-MCNC: 3.7 MG/DL (ref 2.5–4.9)
POTASSIUM SERPL-SCNC: 4.9 MMOL/L (ref 3.5–5.1)
SODIUM SERPL-SCNC: 138 MMOL/L (ref 136–145)

## 2024-09-12 PROCEDURE — 36415 COLL VENOUS BLD VENIPUNCTURE: CPT

## 2024-09-12 PROCEDURE — 80069 RENAL FUNCTION PANEL: CPT

## 2024-09-16 ENCOUNTER — TELEPHONE (OUTPATIENT)
Dept: CARDIOLOGY CLINIC | Age: 74
End: 2024-09-16

## 2024-09-16 NOTE — TELEPHONE ENCOUNTER
----- Message from YANNI VILLARREAL RN sent at 9/16/2024 11:12 AM EDT -----  Please let him know his labs are good, no change in plan of care  dkw

## 2024-09-20 RX ORDER — CARVEDILOL 6.25 MG/1
9.38 TABLET ORAL 2 TIMES DAILY
Qty: 240 TABLET | Refills: 3 | Status: SHIPPED | OUTPATIENT
Start: 2024-09-20

## 2024-10-08 ENCOUNTER — OFFICE VISIT (OUTPATIENT)
Dept: ORTHOPEDIC SURGERY | Age: 74
End: 2024-10-08
Payer: MEDICARE

## 2024-10-08 VITALS — BODY MASS INDEX: 36.65 KG/M2 | HEIGHT: 70 IN | WEIGHT: 256 LBS

## 2024-10-08 DIAGNOSIS — M67.911 TENDINOPATHY OF RIGHT ROTATOR CUFF: Primary | ICD-10-CM

## 2024-10-08 PROCEDURE — 1124F ACP DISCUSS-NO DSCNMKR DOCD: CPT | Performed by: ORTHOPAEDIC SURGERY

## 2024-10-08 PROCEDURE — 99213 OFFICE O/P EST LOW 20 MIN: CPT | Performed by: ORTHOPAEDIC SURGERY

## 2024-10-08 NOTE — PROGRESS NOTES
right knee [facility]    PTCA  1990    stent    SKIN CANCER EXCISION  10/2011    on face  on nose and cheek    SPLENECTOMY  1955    UPPER GASTROINTESTINAL ENDOSCOPY         Current Outpatient Medications on File Prior to Visit   Medication Sig Dispense Refill    carvedilol (COREG) 6.25 MG tablet Take 1.5 tablets by mouth 2 times daily 240 tablet 3    furosemide (LASIX) 20 MG tablet Take 1 tablet by mouth daily 90 tablet 3    apixaban (ELIQUIS) 5 MG TABS tablet Take 1 tablet by mouth 2 times daily 180 tablet 3    atorvastatin (LIPITOR) 80 MG tablet Take 1 tablet by mouth daily 90 tablet 3    lisinopril (PRINIVIL;ZESTRIL) 40 MG tablet Take 1 tablet by mouth daily 90 tablet 2    sildenafil (VIAGRA) 100 MG tablet Take 1 tablet by mouth as needed for Erectile Dysfunction 10 tablet 3    lansoprazole (PREVACID) 30 MG delayed release capsule TAKE 1 CAPSULE BY MOUTH EVERY DAY 90 capsule 0    Coenzyme Q10 400 MG CAPS Take by mouth      cetirizine (ZYRTEC) 10 MG tablet Take 1 tablet by mouth every evening      therapeutic multivitamin-minerals (THERAGRAN-M) tablet Take 1 tablet by mouth daily       No current facility-administered medications on file prior to visit.       Allergies   Allergen Reactions    Adhesive Tape Other (See Comments)     \"skin turns red\"    Metoprolol Diarrhea     Diarrhea/bloating. Improved when switched to coreg. Tolerates other betablockers    Spironolactone Other (See Comments)     gynecomastia       Social History     Socioeconomic History    Marital status:      Spouse name: Yulia    Number of children: 3    Years of education: Not on file    Highest education level: Not on file   Occupational History    Not on file   Tobacco Use    Smoking status: Former     Current packs/day: 0.00     Average packs/day: 1 pack/day for 45.0 years (45.0 ttl pk-yrs)     Types: Cigarettes     Start date: 4/15/1964     Quit date: 4/15/2009     Years since quitting: 15.4    Smokeless tobacco: Never

## 2024-10-15 ENCOUNTER — PATIENT MESSAGE (OUTPATIENT)
Dept: CARDIOLOGY CLINIC | Age: 74
End: 2024-10-15

## 2024-10-21 DIAGNOSIS — K21.9 GASTROESOPHAGEAL REFLUX DISEASE WITHOUT ESOPHAGITIS: ICD-10-CM

## 2024-10-21 RX ORDER — LANSOPRAZOLE 30 MG/1
CAPSULE, DELAYED RELEASE ORAL
Qty: 90 CAPSULE | Refills: 0 | Status: SHIPPED | OUTPATIENT
Start: 2024-10-21

## 2025-01-20 DIAGNOSIS — K21.9 GASTROESOPHAGEAL REFLUX DISEASE WITHOUT ESOPHAGITIS: ICD-10-CM

## 2025-01-27 RX ORDER — LANSOPRAZOLE 30 MG/1
CAPSULE, DELAYED RELEASE ORAL
Qty: 90 CAPSULE | Refills: 0 | Status: SHIPPED | OUTPATIENT
Start: 2025-01-27

## 2025-02-03 ENCOUNTER — TELEPHONE (OUTPATIENT)
Dept: CARDIOLOGY CLINIC | Age: 75
End: 2025-02-03

## 2025-02-03 NOTE — TELEPHONE ENCOUNTER
Pt called to inform the office that his [employer] is indicating he's in a-fib. Pt feels his heart is skipping. Please call the Pt to discuss what he needs to do.  Thank you

## 2025-02-04 ENCOUNTER — TELEPHONE (OUTPATIENT)
Dept: CARDIOLOGY CLINIC | Age: 75
End: 2025-02-04

## 2025-02-04 DIAGNOSIS — I48.0 PAROXYSMAL ATRIAL FIBRILLATION (HCC): Primary | ICD-10-CM

## 2025-02-04 NOTE — TELEPHONE ENCOUNTER
You are scheduled for a cardioversion.    Our  will call you to discuss a date for your procedure.    The day of your procedure you will need to check in at the Registration Desk in the Main Lobby.    PRE-PROCEDURE INSTRUCTIONS   Do not eat or drink anything after midnight the night before your procedure.   Take all your medications with a sip of water in the morning.   Do not hold your Eliquis   Please have a responsible adult to drive you home after your procedure.    If you have any questions regarding your procedure itself or your medications, please call [phone] and ask to speak to an EP nurse.

## 2025-02-04 NOTE — TELEPHONE ENCOUNTER
Called and spoke to pt. He is agreeable to having cardioversion and will discuss ablation at that time.

## 2025-02-07 NOTE — TELEPHONE ENCOUNTER
Procedure: DCCV    Date Of Procedure:  02/12/25    Time Of Arrival: 700AM    Procedure Time:  800AM      Called and spoke to patient and he is agreeable to the date and time. Reviewed the Pre-Procedure instructions and they verbalized understanding. Encouraged to call with any questions or concerns.       Published on [email or web address] / emailed to Cath lab / note in chart / scheduled in Epic/Cupid/Carto

## 2025-02-12 ENCOUNTER — HOSPITAL ENCOUNTER (OUTPATIENT)
Age: 75
Discharge: HOME | End: 2025-02-14
Attending: INTERNAL MEDICINE
Payer: MEDICARE

## 2025-02-12 VITALS
TEMPERATURE: 98 F | RESPIRATION RATE: 13 BRPM | SYSTOLIC BLOOD PRESSURE: 119 MMHG | OXYGEN SATURATION: 99 % | BODY MASS INDEX: 36.4 KG/M2 | DIASTOLIC BLOOD PRESSURE: 90 MMHG | HEART RATE: 55 BPM | WEIGHT: 260 LBS | HEIGHT: 71 IN

## 2025-02-12 DIAGNOSIS — I48.0 PAROXYSMAL ATRIAL FIBRILLATION (HCC): ICD-10-CM

## 2025-02-12 LAB
EKG ATRIAL RATE: 267 BPM
EKG ATRIAL RATE: 61 BPM
EKG DIAGNOSIS: NORMAL
EKG DIAGNOSIS: NORMAL
EKG P AXIS: 88 DEGREES
EKG P-R INTERVAL: 176 MS
EKG Q-T INTERVAL: 448 MS
EKG Q-T INTERVAL: 450 MS
EKG QRS DURATION: 100 MS
EKG QRS DURATION: 106 MS
EKG QTC CALCULATION (BAZETT): 453 MS
EKG QTC CALCULATION (BAZETT): 458 MS
EKG R AXIS: -4 DEGREES
EKG R AXIS: -9 DEGREES
EKG T AXIS: -10 DEGREES
EKG T AXIS: 7 DEGREES
EKG VENTRICULAR RATE: 61 BPM
EKG VENTRICULAR RATE: 63 BPM

## 2025-02-12 PROCEDURE — 2500000003 HC RX 250 WO HCPCS: Performed by: INTERNAL MEDICINE

## 2025-02-12 PROCEDURE — 7100000010 HC PHASE II RECOVERY - FIRST 15 MIN: Performed by: INTERNAL MEDICINE

## 2025-02-12 RX ADMIN — METHOHEXITAL SODIUM 60 MG: 500 INJECTION, POWDER, LYOPHILIZED, FOR SOLUTION INTRAMUSCULAR; INTRAVENOUS; RECTAL at 09:04

## 2025-02-12 NOTE — H&P
tablet Take 1 tablet by mouth daily   Yes Provider, Srinivasa, MD       Allergies   Allergen Reactions    Adhesive Tape Other (See Comments)     \"skin turns red\"       Social History:  Reviewed.  reports that he quit smoking about 15 years ago. His smoking use included cigarettes. He started smoking about 60 years ago. He has a 45.0 pack-year smoking history. He has never used smokeless tobacco. He reports current alcohol use of about 2.0 standard drinks of alcohol per week. He reports that he does not use drugs.     Family History:  Reviewed. Reviewed. No family history of SCD.      Relevant and available labs, and cardiovascular diagnostics reviewed.   Reviewed.

## 2025-02-12 NOTE — PROCEDURES
[facility]     Electrophysiology Procedure Note       Date of Procedure: 2/12/2025  Patient's Name: Carlos Salazar  YOB: 1950   Medical Record Number: [id number]  Procedure Performed by: Rob Flower MD    Procedures performed:  IV sedation.     External Electrical cardioversion   Mallampati3  ASA 3    Indication of the procedure: Persistent atrial fibrillation      Details of procedure:   The patient was brought to the cath lab area in a fasting and non-sedated state. The risks, benefits and alternatives of the procedure were discussed with the patient. The patient opted to proceed with the procedure. Written informed consent was signed and placed in the chart.  A timeout protocol was completed to identify the patient and the procedure being performed.        I pushed 60 mg Brevital.An independent trained observer pushed medications and/or monitored patient  at my direction.   We monitored the patient's level of consciousness and vital signs/physiologic status throughout the procedure duration (see start and stop times below).  Sedation:   Sedation start: 0908  Sedation stop: 0928     Patient is on chronic anticoagulation therapy.   Then we used Brevital for sedation and electrical DC cardioversion was perfomred using 200J, synchronized shock. Patient was converted to sinus rhythm at 81 bpm. The patient tolerated the procedure well and there were no complications.     Conclusion:   Successful external DC cardioversion of atrial fibrillation     Plan:   The patient can be discharged if remains stable. Will continue with medical therapy.

## 2025-02-18 ENCOUNTER — TELEPHONE (OUTPATIENT)
Dept: CARDIOLOGY CLINIC | Age: 75
End: 2025-02-18

## 2025-02-19 ENCOUNTER — TELEPHONE (OUTPATIENT)
Dept: CARDIOLOGY CLINIC | Age: 75
End: 2025-02-19

## 2025-02-19 NOTE — TELEPHONE ENCOUNTER
Pt called trying to reach [facility] to have his Ablation scheduled. He can be reached at  [phone].

## 2025-02-20 PROBLEM — I48.19 ATRIAL FIBRILLATION, PERSISTENT (HCC): Status: ACTIVE | Noted: 2025-02-18

## 2025-02-20 NOTE — TELEPHONE ENCOUNTER
Procedure: AFIB ABL    Date Of Procedure:  03/07/25    Time Of Arrival: 10AM    Procedure Time: 11AM       Called and spoke to patient and he is agreeable to the date and time. Reviewed the Pre-Procedure instructions and they verbalized understanding. Encouraged to call with any questions or concerns.       Published on [employer] / emailed to Cath lab / note in chart / scheduled in Epic/Cupid/Carto

## 2025-02-24 ENCOUNTER — TELEPHONE (OUTPATIENT)
Dept: CARDIOLOGY CLINIC | Age: 75
End: 2025-02-24

## 2025-02-24 ENCOUNTER — HOSPITAL ENCOUNTER (OUTPATIENT)
Age: 75
Discharge: HOME | End: 2025-02-24
Payer: MEDICARE

## 2025-02-24 DIAGNOSIS — I48.91 ATRIAL FIBRILLATION, UNSPECIFIED TYPE (HCC): Primary | ICD-10-CM

## 2025-02-24 DIAGNOSIS — I48.91 ATRIAL FIBRILLATION, UNSPECIFIED TYPE (HCC): ICD-10-CM

## 2025-02-24 LAB
ALBUMIN SERPL-MCNC: 4 G/DL (ref 3.4–5)
ALBUMIN/GLOB SERPL: 1.3 {RATIO} (ref 1.1–2.2)
ALP SERPL-CCNC: 99 U/L (ref 40–129)
ALT SERPL-CCNC: 69 U/L (ref 10–40)
ANION GAP SERPL CALCULATED.3IONS-SCNC: 10 MMOL/L (ref 3–16)
AST SERPL-CCNC: 47 U/L (ref 15–37)
BILIRUB SERPL-MCNC: 0.5 MG/DL (ref 0–1)
BUN SERPL-MCNC: 15 MG/DL (ref 7–20)
CALCIUM SERPL-MCNC: 9.4 MG/DL (ref 8.3–10.6)
CHLORIDE SERPL-SCNC: 101 MMOL/L (ref 99–110)
CO2 SERPL-SCNC: 27 MMOL/L (ref 21–32)
CREAT SERPL-MCNC: 1.1 MG/DL (ref 0.8–1.3)
DEPRECATED RDW RBC AUTO: 14.3 % (ref 12.4–15.4)
GFR SERPLBLD CREATININE-BSD FMLA CKD-EPI: 70 ML/MIN/{1.73_M2}
GLUCOSE SERPL-MCNC: 105 MG/DL (ref 70–99)
HCT VFR BLD AUTO: 44.4 % (ref 40.5–52.5)
HGB BLD-MCNC: 14.9 G/DL (ref 13.5–17.5)
MCH RBC QN AUTO: 30.3 PG (ref 26–34)
MCHC RBC AUTO-ENTMCNC: 33.6 G/DL (ref 31–36)
MCV RBC AUTO: 90.1 FL (ref 80–100)
PLATELET # BLD AUTO: 298 K/UL (ref 135–450)
PMV BLD AUTO: 9.3 FL (ref 5–10.5)
POTASSIUM SERPL-SCNC: 4.5 MMOL/L (ref 3.5–5.1)
PROT SERPL-MCNC: 7 G/DL (ref 6.4–8.2)
RBC # BLD AUTO: 4.92 M/UL (ref 4.2–5.9)
SODIUM SERPL-SCNC: 138 MMOL/L (ref 136–145)
WBC # BLD AUTO: 12.5 K/UL (ref 4–11)

## 2025-02-24 PROCEDURE — 80053 COMPREHEN METABOLIC PANEL: CPT

## 2025-02-24 PROCEDURE — 85027 COMPLETE CBC AUTOMATED: CPT

## 2025-02-24 PROCEDURE — 36415 COLL VENOUS BLD VENIPUNCTURE: CPT

## 2025-02-24 RX ORDER — APIXABAN 5 MG/1
5 TABLET, FILM COATED ORAL 2 TIMES DAILY
Qty: 180 TABLET | Refills: 0 | OUTPATIENT
Start: 2025-02-24

## 2025-02-24 NOTE — PROGRESS NOTES
[facility]  3/3/25  Referring: Dr. Munoz    REASON FOR CONSULT/CHIEF COMPLAINT/HPI     Reason for visit/ Chief complaint  Follow up  CAD   HPI Carlos Salazar is a 74 y.o.  is here today for regular f/u for management of CAD, HTN, hyperlipidemia,  MR, AF gastric ulcer, emphysema,cholecystitis,ERIKA.  He is now fully retired from bus driving. Quit smoking in 2009. Prior previous stenting he had epigastric pain with diaphoresis.    Today he  complains of swelling in bilateral legs, and a croupy cough.  He states he feels better in NSR , less energy when he is in afib. No chest pain, palpitations dizziness or syncope. No orthopnea.  Reports that he can walk up a flight of stairs without stopping.               Patient is compliant with medications and is tolerating them well without side effects     HISTORY/ALLERGIES/ROS     MedHx:  has a past medical history of ASHD (arteriosclerotic heart disease), Cancer (HCC), Cold sore, Colon polyp, COPD (chronic obstructive pulmonary disease) ([facility]), History of blood transfusion, Hyperlipidemia, Hypertension, Kidney stone, Medial meniscus tear, MI (myocardial infarction) (HCC), Obesity (BMI 30.0-34.9), Peyronie's disease, PONV (postoperative nausea and vomiting), Psoriasis, PUD (peptic ulcer disease), Sinus infection, and Transfusion of, blood.  SurgHx:  has a past surgical history that includes Splenectomy (1955); Percutaneous Transluminal Coronary Angio (1990); Kidney stone surgery (03/2003); Skin cancer excision (10/2011); angioplasty (2000); Colonoscopy; Upper gastrointestinal endoscopy; Knee arthroscopy (11/04/2016); Colonoscopy (01/15/2020); and Diagnostic Cardiac Cath Lab Procedure (5/17/2023).   SocHx:  reports that he quit smoking about 15 years ago. His smoking use included cigarettes. He started smoking about 60 years ago. He has a 45 pack-year smoking history. He has never used smokeless tobacco. He reports current alcohol use of about 2.0 standard drinks

## 2025-02-24 NOTE — PATIENT INSTRUCTIONS
Increase lasix to 40 mg daily  No need to continue protonix  Monitor blood pressure at home and call us in three weeks  Repeat liver panel with renal panel on day of ablation

## 2025-02-24 NOTE — TELEPHONE ENCOUNTER
Pt is going to have an ablation on 3/7 and was told he needs labs and they have been ordered.  He came today to have them done, and was told no orders are in.  Can someone please check and put in whatever orders are needed.   Call patient when this has been done so he can have his labs drawn.  Please and thank you.  He can be reached at [phone].

## 2025-02-24 NOTE — TELEPHONE ENCOUNTER
Pt stopped in 1st thing this morning.  He has had trouble getting this refilled with his pharmacy.  They told him it was a problem with their system.  He is suppose to take 1 pill at 8:30 this morning.  He wants to know if this can be done ASAP.  Please call pt with any questions at [phone] and also advise if this can be done this am.  Thank you.

## 2025-02-24 NOTE — TELEPHONE ENCOUNTER
Patient at Cath lab check in desk. Needs lab orders and is out of eliquis. Zamzam LIAO placed lab orders and sent eliquis refill. Lab order printed and given to patient by MDRN. Patient also given 1 box of eliquis samples to hold him over until refill is picked up.

## 2025-03-03 ENCOUNTER — OFFICE VISIT (OUTPATIENT)
Dept: CARDIOLOGY CLINIC | Age: 75
End: 2025-03-03
Payer: MEDICARE

## 2025-03-03 VITALS
WEIGHT: 266.7 LBS | OXYGEN SATURATION: 99 % | BODY MASS INDEX: 37.34 KG/M2 | DIASTOLIC BLOOD PRESSURE: 86 MMHG | SYSTOLIC BLOOD PRESSURE: 142 MMHG | HEART RATE: 52 BPM | HEIGHT: 71 IN

## 2025-03-03 DIAGNOSIS — R74.8 ELEVATED LIVER ENZYMES: ICD-10-CM

## 2025-03-03 DIAGNOSIS — E78.2 MIXED HYPERLIPIDEMIA: ICD-10-CM

## 2025-03-03 DIAGNOSIS — I50.33 ACUTE ON CHRONIC DIASTOLIC (CONGESTIVE) HEART FAILURE (HCC): ICD-10-CM

## 2025-03-03 DIAGNOSIS — E66.811 OBESITY (BMI 30.0-34.9): ICD-10-CM

## 2025-03-03 DIAGNOSIS — I48.19 ATRIAL FIBRILLATION, PERSISTENT (HCC): Primary | ICD-10-CM

## 2025-03-03 DIAGNOSIS — I25.10 CORONARY ARTERY DISEASE INVOLVING NATIVE CORONARY ARTERY OF NATIVE HEART WITHOUT ANGINA PECTORIS: ICD-10-CM

## 2025-03-03 DIAGNOSIS — I10 ESSENTIAL HYPERTENSION: ICD-10-CM

## 2025-03-03 DIAGNOSIS — I48.19 PERSISTENT ATRIAL FIBRILLATION (HCC): ICD-10-CM

## 2025-03-03 PROCEDURE — 1160F RVW MEDS BY RX/DR IN RCRD: CPT | Performed by: INTERNAL MEDICINE

## 2025-03-03 PROCEDURE — 3079F DIAST BP 80-89 MM HG: CPT | Performed by: INTERNAL MEDICINE

## 2025-03-03 PROCEDURE — 99214 OFFICE O/P EST MOD 30 MIN: CPT | Performed by: INTERNAL MEDICINE

## 2025-03-03 PROCEDURE — 1159F MED LIST DOCD IN RCRD: CPT | Performed by: INTERNAL MEDICINE

## 2025-03-03 PROCEDURE — 1124F ACP DISCUSS-NO DSCNMKR DOCD: CPT | Performed by: INTERNAL MEDICINE

## 2025-03-03 PROCEDURE — 3077F SYST BP >= 140 MM HG: CPT | Performed by: INTERNAL MEDICINE

## 2025-03-03 RX ORDER — ATORVASTATIN CALCIUM 80 MG/1
80 TABLET, FILM COATED ORAL DAILY
Qty: 90 TABLET | Refills: 3 | Status: SHIPPED | OUTPATIENT
Start: 2025-03-03

## 2025-03-03 RX ORDER — CARVEDILOL 6.25 MG/1
9.38 TABLET ORAL 2 TIMES DAILY
Qty: 240 TABLET | Refills: 3 | Status: SHIPPED | OUTPATIENT
Start: 2025-03-03

## 2025-03-03 RX ORDER — FUROSEMIDE 40 MG/1
40 TABLET ORAL DAILY
Qty: 90 TABLET | Refills: 3 | Status: SHIPPED | OUTPATIENT
Start: 2025-03-03

## 2025-03-03 RX ORDER — LISINOPRIL 40 MG/1
40 TABLET ORAL DAILY
Qty: 90 TABLET | Refills: 2 | Status: SHIPPED | OUTPATIENT
Start: 2025-03-03

## 2025-03-04 ENCOUNTER — TELEPHONE (OUTPATIENT)
Dept: CARDIOLOGY CLINIC | Age: 75
End: 2025-03-04

## 2025-03-04 NOTE — TELEPHONE ENCOUNTER
Pt is having an ablation on 03/07 he wants to know when to stop his Eliquis.  Please call to give clear instruction.  Thank you

## 2025-03-07 ENCOUNTER — HOSPITAL ENCOUNTER (OUTPATIENT)
Age: 75
Setting detail: OUTPATIENT SURGERY
Discharge: HOME | End: 2025-03-09
Attending: INTERNAL MEDICINE
Payer: MEDICARE

## 2025-03-07 ENCOUNTER — ANESTHESIA (OUTPATIENT)
Age: 75
End: 2025-03-07
Payer: MEDICARE

## 2025-03-07 ENCOUNTER — ANESTHESIA EVENT (OUTPATIENT)
Age: 75
End: 2025-03-07
Payer: MEDICARE

## 2025-03-07 ENCOUNTER — HOSPITAL ENCOUNTER (OUTPATIENT)
Age: 75
Setting detail: OUTPATIENT SURGERY
Discharge: HOME | End: 2025-03-07
Attending: INTERNAL MEDICINE | Admitting: INTERNAL MEDICINE
Payer: MEDICARE

## 2025-03-07 VITALS
BODY MASS INDEX: 36.4 KG/M2 | HEIGHT: 71 IN | SYSTOLIC BLOOD PRESSURE: 140 MMHG | HEART RATE: 78 BPM | RESPIRATION RATE: 13 BRPM | WEIGHT: 260 LBS | TEMPERATURE: 97.4 F | OXYGEN SATURATION: 98 % | DIASTOLIC BLOOD PRESSURE: 86 MMHG

## 2025-03-07 DIAGNOSIS — I48.19 ATRIAL FIBRILLATION, PERSISTENT (HCC): ICD-10-CM

## 2025-03-07 DIAGNOSIS — I48.19 PERSISTENT ATRIAL FIBRILLATION (HCC): ICD-10-CM

## 2025-03-07 LAB
ECHO AO ASC DIAM: 3.8 CM
ECHO AO ASCENDING AORTA INDEX: 1.61 CM/M2
ECHO AO ROOT DIAM: 4 CM
ECHO AO ROOT INDEX: 1.69 CM/M2
ECHO BSA: 2.43 M2
ECHO BSA: 2.43 M2
EKG ATRIAL RATE: 326 BPM
EKG DIAGNOSIS: NORMAL
EKG Q-T INTERVAL: 412 MS
EKG QRS DURATION: 94 MS
EKG QTC CALCULATION (BAZETT): 441 MS
EKG R AXIS: 6 DEGREES
EKG T AXIS: -10 DEGREES
EKG VENTRICULAR RATE: 69 BPM
POC ACT LR: 281 SEC
POC ACT LR: 336 SEC
POC ACT LR: 384 SEC

## 2025-03-07 PROCEDURE — 7100000001 HC PACU RECOVERY - ADDTL 15 MIN: Performed by: INTERNAL MEDICINE

## 2025-03-07 PROCEDURE — 93655 ICAR CATH ABLTJ DSCRT ARRHYT: CPT | Performed by: INTERNAL MEDICINE

## 2025-03-07 PROCEDURE — 6360000002 HC RX W HCPCS: Performed by: NURSE ANESTHETIST, CERTIFIED REGISTERED

## 2025-03-07 PROCEDURE — 93312 ECHO TRANSESOPHAGEAL: CPT

## 2025-03-07 PROCEDURE — C1894 INTRO/SHEATH, NON-LASER: HCPCS | Performed by: INTERNAL MEDICINE

## 2025-03-07 PROCEDURE — 2500000003 HC RX 250 WO HCPCS: Performed by: NURSE ANESTHETIST, CERTIFIED REGISTERED

## 2025-03-07 PROCEDURE — C1732 CATH, EP, DIAG/ABL, 3D/VECT: HCPCS | Performed by: INTERNAL MEDICINE

## 2025-03-07 PROCEDURE — C1769 GUIDE WIRE: HCPCS | Performed by: INTERNAL MEDICINE

## 2025-03-07 PROCEDURE — 93005 ELECTROCARDIOGRAM TRACING: CPT

## 2025-03-07 PROCEDURE — 7100000010 HC PHASE II RECOVERY - FIRST 15 MIN: Performed by: INTERNAL MEDICINE

## 2025-03-07 PROCEDURE — 3700000000 HC ANESTHESIA ATTENDED CARE: Performed by: INTERNAL MEDICINE

## 2025-03-07 PROCEDURE — 93656 COMPRE EP EVAL ABLTJ ATR FIB: CPT | Performed by: INTERNAL MEDICINE

## 2025-03-07 PROCEDURE — 7100000011 HC PHASE II RECOVERY - ADDTL 15 MIN: Performed by: INTERNAL MEDICINE

## 2025-03-07 PROCEDURE — 93622 COMP EP EVAL L VENTR PAC&REC: CPT | Performed by: INTERNAL MEDICINE

## 2025-03-07 PROCEDURE — C1760 CLOSURE DEV, VASC: HCPCS | Performed by: INTERNAL MEDICINE

## 2025-03-07 PROCEDURE — C1766 INTRO/SHEATH,STRBLE,NON-PEEL: HCPCS | Performed by: INTERNAL MEDICINE

## 2025-03-07 PROCEDURE — 2709999900 HC NON-CHARGEABLE SUPPLY: Performed by: INTERNAL MEDICINE

## 2025-03-07 PROCEDURE — 6360000002 HC RX W HCPCS: Performed by: INTERNAL MEDICINE

## 2025-03-07 PROCEDURE — 3700000001 HC ADD 15 MINUTES (ANESTHESIA): Performed by: INTERNAL MEDICINE

## 2025-03-07 PROCEDURE — C1730 CATH, EP, 19 OR FEW ELECT: HCPCS | Performed by: INTERNAL MEDICINE

## 2025-03-07 PROCEDURE — 7100000000 HC PACU RECOVERY - FIRST 15 MIN: Performed by: INTERNAL MEDICINE

## 2025-03-07 PROCEDURE — 85347 COAGULATION TIME ACTIVATED: CPT

## 2025-03-07 PROCEDURE — C1733 CATH, EP, OTHR THAN COOL-TIP: HCPCS | Performed by: INTERNAL MEDICINE

## 2025-03-07 PROCEDURE — 2580000003 HC RX 258: Performed by: NURSE ANESTHETIST, CERTIFIED REGISTERED

## 2025-03-07 PROCEDURE — C1759 CATH, INTRA ECHOCARDIOGRAPHY: HCPCS | Performed by: INTERNAL MEDICINE

## 2025-03-07 PROCEDURE — 93657 TX L/R ATRIAL FIB ADDL: CPT | Performed by: INTERNAL MEDICINE

## 2025-03-07 RX ORDER — LIDOCAINE HYDROCHLORIDE 20 MG/ML
INJECTION, SOLUTION EPIDURAL; INFILTRATION; INTRACAUDAL; PERINEURAL
Status: DISCONTINUED | OUTPATIENT
Start: 2025-03-07 | End: 2025-03-07 | Stop reason: SDUPTHER

## 2025-03-07 RX ORDER — DEXAMETHASONE SODIUM PHOSPHATE 4 MG/ML
INJECTION, SOLUTION INTRA-ARTICULAR; INTRALESIONAL; INTRAMUSCULAR; INTRAVENOUS; SOFT TISSUE
Status: DISCONTINUED | OUTPATIENT
Start: 2025-03-07 | End: 2025-03-07 | Stop reason: SDUPTHER

## 2025-03-07 RX ORDER — OXYCODONE HYDROCHLORIDE 5 MG/1
10 TABLET ORAL PRN
Status: DISCONTINUED | OUTPATIENT
Start: 2025-03-07 | End: 2025-03-07 | Stop reason: HOSPADM

## 2025-03-07 RX ORDER — NALOXONE HYDROCHLORIDE 0.4 MG/ML
INJECTION, SOLUTION INTRAMUSCULAR; INTRAVENOUS; SUBCUTANEOUS PRN
Status: DISCONTINUED | OUTPATIENT
Start: 2025-03-07 | End: 2025-03-07 | Stop reason: HOSPADM

## 2025-03-07 RX ORDER — FENTANYL CITRATE 50 UG/ML
INJECTION, SOLUTION INTRAMUSCULAR; INTRAVENOUS
Status: DISCONTINUED | OUTPATIENT
Start: 2025-03-07 | End: 2025-03-07 | Stop reason: SDUPTHER

## 2025-03-07 RX ORDER — OXYCODONE HYDROCHLORIDE 5 MG/1
5 TABLET ORAL PRN
Status: DISCONTINUED | OUTPATIENT
Start: 2025-03-07 | End: 2025-03-07 | Stop reason: HOSPADM

## 2025-03-07 RX ORDER — ONDANSETRON 2 MG/ML
INJECTION INTRAMUSCULAR; INTRAVENOUS
Status: DISCONTINUED | OUTPATIENT
Start: 2025-03-07 | End: 2025-03-07 | Stop reason: SDUPTHER

## 2025-03-07 RX ORDER — SODIUM CHLORIDE 9 MG/ML
INJECTION, SOLUTION INTRAVENOUS PRN
Status: CANCELLED | OUTPATIENT
Start: 2025-03-07

## 2025-03-07 RX ORDER — SODIUM CHLORIDE 0.9 % (FLUSH) 0.9 %
5-40 SYRINGE (ML) INJECTION EVERY 12 HOURS SCHEDULED
Status: DISCONTINUED | OUTPATIENT
Start: 2025-03-07 | End: 2025-03-07 | Stop reason: HOSPADM

## 2025-03-07 RX ORDER — SODIUM CHLORIDE 0.9 % (FLUSH) 0.9 %
5-40 SYRINGE (ML) INJECTION PRN
Status: DISCONTINUED | OUTPATIENT
Start: 2025-03-07 | End: 2025-03-07 | Stop reason: HOSPADM

## 2025-03-07 RX ORDER — SODIUM CHLORIDE 9 MG/ML
INJECTION, SOLUTION INTRAVENOUS
Status: DISCONTINUED | OUTPATIENT
Start: 2025-03-07 | End: 2025-03-07 | Stop reason: SDUPTHER

## 2025-03-07 RX ORDER — HYDROMORPHONE HYDROCHLORIDE 2 MG/ML
0.5 INJECTION, SOLUTION INTRAMUSCULAR; INTRAVENOUS; SUBCUTANEOUS EVERY 5 MIN PRN
Status: DISCONTINUED | OUTPATIENT
Start: 2025-03-07 | End: 2025-03-07 | Stop reason: HOSPADM

## 2025-03-07 RX ORDER — PROPOFOL 10 MG/ML
INJECTION, EMULSION INTRAVENOUS
Status: DISCONTINUED | OUTPATIENT
Start: 2025-03-07 | End: 2025-03-07 | Stop reason: SDUPTHER

## 2025-03-07 RX ORDER — SODIUM CHLORIDE 0.9 % (FLUSH) 0.9 %
5-40 SYRINGE (ML) INJECTION EVERY 12 HOURS SCHEDULED
Status: CANCELLED | OUTPATIENT
Start: 2025-03-07

## 2025-03-07 RX ORDER — SODIUM CHLORIDE 9 MG/ML
INJECTION, SOLUTION INTRAVENOUS PRN
Status: DISCONTINUED | OUTPATIENT
Start: 2025-03-07 | End: 2025-03-07 | Stop reason: HOSPADM

## 2025-03-07 RX ORDER — ONDANSETRON 2 MG/ML
4 INJECTION INTRAMUSCULAR; INTRAVENOUS
Status: DISCONTINUED | OUTPATIENT
Start: 2025-03-07 | End: 2025-03-07 | Stop reason: HOSPADM

## 2025-03-07 RX ORDER — EPHEDRINE SULFATE 50 MG/ML
INJECTION INTRAVENOUS
Status: DISCONTINUED | OUTPATIENT
Start: 2025-03-07 | End: 2025-03-07 | Stop reason: SDUPTHER

## 2025-03-07 RX ORDER — METOCLOPRAMIDE HYDROCHLORIDE 5 MG/ML
10 INJECTION INTRAMUSCULAR; INTRAVENOUS
Status: DISCONTINUED | OUTPATIENT
Start: 2025-03-07 | End: 2025-03-07 | Stop reason: HOSPADM

## 2025-03-07 RX ORDER — HEPARIN SODIUM 1000 [USP'U]/ML
INJECTION, SOLUTION INTRAVENOUS; SUBCUTANEOUS
Status: DISCONTINUED | OUTPATIENT
Start: 2025-03-07 | End: 2025-03-07 | Stop reason: SDUPTHER

## 2025-03-07 RX ORDER — ROCURONIUM BROMIDE 10 MG/ML
INJECTION, SOLUTION INTRAVENOUS
Status: DISCONTINUED | OUTPATIENT
Start: 2025-03-07 | End: 2025-03-07 | Stop reason: SDUPTHER

## 2025-03-07 RX ORDER — FENTANYL CITRATE 50 UG/ML
25 INJECTION, SOLUTION INTRAMUSCULAR; INTRAVENOUS EVERY 5 MIN PRN
Status: DISCONTINUED | OUTPATIENT
Start: 2025-03-07 | End: 2025-03-07 | Stop reason: HOSPADM

## 2025-03-07 RX ORDER — SODIUM CHLORIDE 0.9 % (FLUSH) 0.9 %
5-40 SYRINGE (ML) INJECTION PRN
Status: CANCELLED | OUTPATIENT
Start: 2025-03-07

## 2025-03-07 RX ORDER — LANSOPRAZOLE 30 MG/1
30 CAPSULE, DELAYED RELEASE ORAL DAILY
COMMUNITY

## 2025-03-07 RX ORDER — GLYCOPYRROLATE 0.2 MG/ML
INJECTION INTRAMUSCULAR; INTRAVENOUS
Status: DISCONTINUED | OUTPATIENT
Start: 2025-03-07 | End: 2025-03-07 | Stop reason: SDUPTHER

## 2025-03-07 RX ADMIN — SUGAMMADEX 200 MG: 100 INJECTION, SOLUTION INTRAVENOUS at 12:35

## 2025-03-07 RX ADMIN — DEXAMETHASONE SODIUM PHOSPHATE 8 MG: 4 INJECTION, SOLUTION INTRAMUSCULAR; INTRAVENOUS at 10:52

## 2025-03-07 RX ADMIN — SODIUM CHLORIDE: 9 INJECTION, SOLUTION INTRAVENOUS at 12:04

## 2025-03-07 RX ADMIN — ROCURONIUM BROMIDE 30 MG: 10 INJECTION, SOLUTION INTRAVENOUS at 11:45

## 2025-03-07 RX ADMIN — HEPARIN SODIUM 5000 UNITS: 1000 INJECTION INTRAVENOUS; SUBCUTANEOUS at 12:02

## 2025-03-07 RX ADMIN — EPHEDRINE SULFATE 10 MG: 50 INJECTION, SOLUTION INTRAVENOUS at 11:30

## 2025-03-07 RX ADMIN — PHENYLEPHRINE HYDROCHLORIDE 200 MCG: 10 INJECTION INTRAVENOUS at 11:20

## 2025-03-07 RX ADMIN — FENTANYL CITRATE 50 MCG: 50 INJECTION, SOLUTION INTRAMUSCULAR; INTRAVENOUS at 12:34

## 2025-03-07 RX ADMIN — ONDANSETRON 4 MG: 2 INJECTION INTRAMUSCULAR; INTRAVENOUS at 10:52

## 2025-03-07 RX ADMIN — PHENYLEPHRINE HYDROCHLORIDE 100 MCG: 10 INJECTION INTRAVENOUS at 11:13

## 2025-03-07 RX ADMIN — FENTANYL CITRATE 50 MCG: 50 INJECTION, SOLUTION INTRAMUSCULAR; INTRAVENOUS at 10:47

## 2025-03-07 RX ADMIN — EPHEDRINE SULFATE 20 MG: 50 INJECTION, SOLUTION INTRAVENOUS at 11:49

## 2025-03-07 RX ADMIN — PROPOFOL 200 MG: 10 INJECTION, EMULSION INTRAVENOUS at 10:47

## 2025-03-07 RX ADMIN — PHENYLEPHRINE HYDROCHLORIDE 100 MCG: 10 INJECTION INTRAVENOUS at 11:05

## 2025-03-07 RX ADMIN — SODIUM CHLORIDE: 9 INJECTION, SOLUTION INTRAVENOUS at 10:43

## 2025-03-07 RX ADMIN — ROCURONIUM BROMIDE 50 MG: 10 INJECTION, SOLUTION INTRAVENOUS at 10:47

## 2025-03-07 RX ADMIN — HEPARIN SODIUM 14000 UNITS: 1000 INJECTION INTRAVENOUS; SUBCUTANEOUS at 11:24

## 2025-03-07 RX ADMIN — GLYCOPYRROLATE 0.4 MG: 0.2 INJECTION, SOLUTION INTRAMUSCULAR; INTRAVENOUS at 11:29

## 2025-03-07 RX ADMIN — LIDOCAINE HYDROCHLORIDE 100 MG: 20 INJECTION, SOLUTION EPIDURAL; INFILTRATION; INTRACAUDAL; PERINEURAL at 10:47

## 2025-03-07 NOTE — PROCEDURES
[facility]     Electrophysiology Procedure Note       Date of Procedure: 3/7/2025  Patient's Name: Carlos Salazar  YOB: 1950   Medical Record Number: [id number]  Referring Physician: Red Munoz MD  Procedure Performed by: Rob Flower MD    Procedure performed:     Electrophysiology study with left atrial recording and mapping   3-D electroanatomical mapping of the left atrium and  right atium.      Transseptal puncture through an intact septum .   Intracardiac echocardiography.   PFA ablation of atrial fibrillation and pulmonary veins isolation  Additional ablation/creation of roofline and posterior line with PFA   LV pacing and sensing    Deployment of closure device on all   access sites.           Indications for procedure:   Symptomatic atrial fibrillation, failed antiarrhythmic therapy and cardioversion in the past   Carlos Salazar is a 74 y.o. male   Due to failure of antiarrhythmic therapy in the past, patient has been scheduled to have ablation for symptomatic atrial fibrillation.     Details of Procedure:   The risks, benefits and alternatives of the ablation procedure were discussed with the patient. The risks including, but not limited to, the risks of bleeding, infection, radiation exposure, injury to vascular, cardiac and surrounding structures (including pneumothorax), stroke, cardiac perforation, tamponade, need for emergent open heart surgery, need for pacemaker implantation, esophageal injury and fistula, myocardial infarction and death were discussed in detail. The patient opted to proceed with the ablation. Written informed consent was signed and placed in the chart.     Patient was brought to the EP lab in a fasting non-sedate state. Patient underwent general anesthesia by anesthesia team. We initially performed a transesophageal echo that showed no left atrial appendage clot/thrombus.              Both groins were prepped in a sterile fashion. We gained

## 2025-03-07 NOTE — PROGRESS NOTES
Discussed discharge paperwork, extremity care/expectations, and all home medications with patient and patients wife- both verbalized understanding. Patients wife assisted pt in safely getting dressed and IV was removed with no complications. [facility] All questions answered at this time and phone number for Dr. Flower's office provided. Patient sent home with copy of discharge instructions and all patient belongings at this time. Groin remains CDI and soft. Discharge complete

## 2025-03-07 NOTE — PROGRESS NOTES
Patient to PACU from cath lab, drowsy. VSS- on 6L simple mask satting high 90s. NSR on monitor. Dressing to R groin CDI with figure of 8 in place- to be removed at 1330. R pedal pulse 1+ with extremity warm. Patient in supine position. Will continue to monitor

## 2025-03-07 NOTE — DISCHARGE INSTRUCTIONS
3/7/2025 Follow up with Dr. Flower with any questions, concerns, results, and an appointment after your procedure in the time period recommended.  Phone: [phone]        ABLATION DISCHARGE INSTRUCTIONS:    Care of your puncture site:  Remove bandage 24 hours after the procedure.  May shower in 24 hours but do not sit in a bathtub/pool of water for 5 days or until the wound is healed.  Inspect the site daily and gently clean using soap and water while standing in the shower.  Dry thoroughly and apply a Band-Aid that covers the entire site. Do not apply powder or lotion.    Normal Observations:  Soreness or tenderness which may last one week.  Mild oozing from the incision site.  Possible bruising that could last 2 weeks.    Activity:  You may resume driving 24 hours following the procedure.  You may resume normal activity in 3 days or after the wound heals.  Avoid lifting more than 10 pounds for 3 days or until the wound heals.  Avoid strenuous exercise or activity for 1 week.    Nutrition:  Regular diet.  Drink at least 8 to 10 glasses of decaffeinated, non-alcoholic fluid for the next 24 hours to flush the x-ray dye used for your angiogram out of your body.    Call your doctor immediately if your condition worsens, for any other concerns, for a follow-up appointment or if you experience any of the following:  Significant bleeding that does not stop after 10 minutes of applying firm pressure on the puncture site.  Increased swelling on the groin or leg.  Unusual pain, numbness, or tingling of the groin or down the leg.  Any signs of infection such as: redness, yellow drainage at the site, swelling or pain.    Other Instructions:  Hold Metformin or Metformin containing drugs for 48 hours after procedure.    Unless otherwise instructed, you will begin or resume your Xarelto or Eliquis this evening.      SEDATION DISCHARGE INSTRUCTIONS    3/7/2025     Wear your seatbelt home.  You are under the influence of drugs.

## 2025-03-07 NOTE — PROGRESS NOTES
Figure of 8 stitch removed at this time. No bleeding noted. Groin soft. Quick clot and tegaderm applied. Will sit patient up 30 degrees

## 2025-03-07 NOTE — PROGRESS NOTES
VSS. Dressing to R groin CDI, area soft. R pedal pulse 1+ with extremity warm. Patient denies pain and nausea. Wife called back to bedside. Phase one criteria met, will transition to phase two for discharge. Bedrest up at 1430

## 2025-03-07 NOTE — ANESTHESIA POSTPROCEDURE EVALUATION
Department of Anesthesiology  Postprocedure Note    Patient: Carlos Salazar  MRN: [id number]  YOB: 1950  Date of evaluation: 3/7/2025    Procedure Summary       Date: 03/07/25 Room / Location: [facility] EP LAB 5 / [facility] CARDIAC CATH LAB    Anesthesia Start: 1043 Anesthesia Stop: 1244    Procedure: Ablation A-fib w complete ep study Diagnosis:       Atrial fibrillation, persistent (HCC)      (Atrial fibrillation, persistent (HCC) [I48.19])    Providers: Rob Flower MD Responsible Provider: Alexys Retana DO    Anesthesia Type: general ASA Status: 3            Anesthesia Type: No value filed.    Pee Phase I: Pee Score: 8    Pee Phase II:      Anesthesia Post Evaluation    Patient location during evaluation: PACU  Patient participation: complete - patient participated  Level of consciousness: awake and alert  Airway patency: patent  Nausea & Vomiting: no nausea  Cardiovascular status: hemodynamically stable  Respiratory status: acceptable  Hydration status: stable  Pain management: adequate    There were no known notable events for this encounter.

## 2025-03-07 NOTE — H&P
[facility]   Electrophysiology     CC: PAF    HPI: Carlos Salazar is a 73 y.o. male with a past medical history of CAD, HTN, HLD, MR, and AF.     Monitor 05/2023     05/15/2023 presented to ED with HTN , back pain, chest pressure. 05/17/2023 [facility] with PCI to LAD     S/p DCCV (6/13/23)    Recurrence noted at office visit 07/05/2023, declined amiodarone, s/p cardioversion 07/10/2023.    Interval History: Carlos presents today in follow to cardioversion. Overall, he feels well and has no complaints of dizziness or palpitations. We will continue to monitor.  Recurrent Atrial fibrillation   Assessment and plan:    Atrial fibrillation, post cardioversion       S/p cardioversion  06/13/2023,  07/05/2023   Monitor 05/08/2023 - 100% atrial fibrillation, fastest 109 bpm,    Noted during cath  2003      Continue  toprol 50 mg   Patient has a [id number] Score of 3 ( age, CAD, HTN)     ~ continue Eliquis 5 mg     ~  creatinine 07/19/2023 1.1     ~ h/o upper GI bleed due to asa, continue prevacid    Ablation was discussed at OV with [facility]PS[facility]    If he has recurrence of atrial fibrillation, I think he will benefit from ablation since he was symptomatic with A-fib.    cardioversion     Bradycardia    HR 50 today   Average hear rate 52 bpm, minimum heart rate 31 bpm on recent monitor    Pauses on monitor -mostly nocturnal up to 2.29 seconds     Coronary artery Disease   [facility] w ith PCI to LAD 05/17/2023    RCA stent 2000   Continue  statin, bb, plavix   Follows with Dr. Hutchinson     HTN  -Controlled 118/74  -BP goal <130/80  -Home BP monitoring encouraged, printed information provided on how to accurately measure BP at home.  -Counseled to follow a low salt diet to assure blood pressure remains controlled for cardiovascular risk factor modification.   -The patient is counseled to get regular exercise 3-5 times per week and maintain a healthy weight reduce cardiovascular risk factors.    - no norvasc due to LE swelling     -  Reactions    Adhesive Tape Other (See Comments)     \"skin turns red\"       Social History:  Reviewed.  reports that he quit smoking about 15 years ago. His smoking use included cigarettes. He started smoking about 60 years ago. He has a 45.0 pack-year smoking history. He has never used smokeless tobacco. He reports current alcohol use of about 2.0 standard drinks of alcohol per week. He reports that he does not use drugs.     Family History:  Reviewed. Reviewed. No family history of SCD.      Relevant and available labs, and cardiovascular diagnostics reviewed.   Reviewed.

## 2025-03-07 NOTE — ANESTHESIA PRE PROCEDURE
Comments)     gynecomastia       Problem List:    Patient Active Problem List   Diagnosis Code   • HLD (hyperlipidemia) E78.5   • Essential hypertension I10   • Coronary artery disease involving native coronary artery of native heart without angina pectoris I25.10   • Peyronie's disease N48.6   • Congestion [id number]   • Acute rhinosinusitis J01.90   • Seasonal allergies J30.2   • Hamstring tightness of both lower extremities M62.9   • Obesity (BMI 30.0-34.9) E66.811   • Chondromalacia patellae of right knee M22.41   • Chronic pain of right knee M25.561, G89.29   • H/O spleen injury: Spleenectomy 1957 Z87.828   • S/P right knee arthroscopy, chondroplasty, synovectomy and partial medial meniscectomy 11/04/2016 Z98.890   • Mixed hyperlipidemia E78.2   • Pre-diabetes R73.03   • Atrial fibrillation with slow ventricular response (HCC) I48.91   • Angina pectoris, unspecified I20.9   • Bradycardia R00.1   • Snoring R06.83   • Hypersomnia G47.10   • Severe obesity (BMI 35.0-39.9) with comorbidity E66.01   • ERIKA on CPAP G47.33   • Acute on chronic diastolic (congestive) heart failure (HCC) I50.33   • Atrial fibrillation, persistent (HCC) I48.19       Past Medical History:        Diagnosis Date   • ASHD (arteriosclerotic heart disease)    • Cancer ([facility])     skin   • Cold sore 10/2016   • Colon polyp    • COPD (chronic obstructive pulmonary disease) ([facility]) 2006    NO ISSUES IN LAST 10 YEARS- QUIT SMOKING   • History of blood transfusion    • Hyperlipidemia    • Hypertension    • Kidney stone    • Medial meniscus tear     RIGHT   • MI (myocardial infarction) ([facility]) 01/2000   • Obesity (BMI 30.0-34.9) 9/22/2016   • Peyronie's disease 7/28/2011   • PONV (postoperative nausea and vomiting)    • Psoriasis    • PUD (peptic ulcer disease)    • Sinus infection 10/2016   • Transfusion of, blood     pt has a history of receiving blood or blood product transfusion(s)       Past Surgical History:        Procedure Laterality Date   •

## 2025-03-07 NOTE — PROGRESS NOTES
Bedrest completed- patient up to walk unit. Dressing to groin remains CDI, groin soft. Pt tolerated well. Will continue with discharge instructions

## 2025-03-10 ENCOUNTER — PATIENT MESSAGE (OUTPATIENT)
Dept: CARDIOLOGY CLINIC | Age: 75
End: 2025-03-10

## 2025-03-10 LAB
ECHO AO ASC DIAM: 3.8 CM
ECHO AO ASCENDING AORTA INDEX: 1.61 CM/M2
ECHO AO ROOT DIAM: 4 CM
ECHO AO ROOT INDEX: 1.69 CM/M2
ECHO BSA: 2.43 M2

## 2025-03-10 PROCEDURE — 93320 DOPPLER ECHO COMPLETE: CPT | Performed by: INTERNAL MEDICINE

## 2025-03-10 PROCEDURE — 93325 DOPPLER ECHO COLOR FLOW MAPG: CPT | Performed by: INTERNAL MEDICINE

## 2025-03-10 PROCEDURE — 93312 ECHO TRANSESOPHAGEAL: CPT | Performed by: INTERNAL MEDICINE

## 2025-03-13 RX ORDER — CARVEDILOL 6.25 MG/1
9.38 TABLET ORAL 2 TIMES DAILY
Qty: 270 TABLET | Refills: 3 | Status: SHIPPED | OUTPATIENT
Start: 2025-03-13

## 2025-04-29 ASSESSMENT — SLEEP AND FATIGUE QUESTIONNAIRES
HOW LIKELY ARE YOU TO NOD OFF OR FALL ASLEEP WHILE SITTING AND TALKING TO SOMEONE: WOULD NEVER DOZE
HOW LIKELY ARE YOU TO NOD OFF OR FALL ASLEEP WHILE SITTING AND READING: WOULD NEVER DOZE
HOW LIKELY ARE YOU TO NOD OFF OR FALL ASLEEP WHILE LYING DOWN TO REST IN THE AFTERNOON WHEN CIRCUMSTANCES PERMIT: WOULD NEVER DOZE
HOW LIKELY ARE YOU TO NOD OFF OR FALL ASLEEP IN A CAR, WHILE STOPPED FOR A FEW MINUTES IN TRAFFIC: WOULD NEVER DOZE
HOW LIKELY ARE YOU TO NOD OFF OR FALL ASLEEP WHILE SITTING QUIETLY AFTER LUNCH WITHOUT ALCOHOL: WOULD NEVER DOZE
HOW LIKELY ARE YOU TO NOD OFF OR FALL ASLEEP WHILE WATCHING TV: MODERATE CHANCE OF DOZING
ESS TOTAL SCORE: 2
HOW LIKELY ARE YOU TO NOD OFF OR FALL ASLEEP WHEN YOU ARE A PASSENGER IN A CAR FOR AN HOUR WITHOUT A BREAK: WOULD NEVER DOZE
HOW LIKELY ARE YOU TO NOD OFF OR FALL ASLEEP WHEN YOU ARE A PASSENGER IN A CAR FOR AN HOUR WITHOUT A BREAK: WOULD NEVER DOZE
HOW LIKELY ARE YOU TO NOD OFF OR FALL ASLEEP WHILE SITTING INACTIVE IN A PUBLIC PLACE: WOULD NEVER DOZE
HOW LIKELY ARE YOU TO NOD OFF OR FALL ASLEEP WHILE SITTING AND READING: WOULD NEVER DOZE
HOW LIKELY ARE YOU TO NOD OFF OR FALL ASLEEP WHILE WATCHING TV: MODERATE CHANCE OF DOZING
HOW LIKELY ARE YOU TO NOD OFF OR FALL ASLEEP WHILE SITTING INACTIVE IN A PUBLIC PLACE: WOULD NEVER DOZE
HOW LIKELY ARE YOU TO NOD OFF OR FALL ASLEEP WHILE SITTING AND TALKING TO SOMEONE: WOULD NEVER DOZE
HOW LIKELY ARE YOU TO NOD OFF OR FALL ASLEEP WHILE SITTING QUIETLY AFTER LUNCH WITHOUT ALCOHOL: WOULD NEVER DOZE
HOW LIKELY ARE YOU TO NOD OFF OR FALL ASLEEP IN A CAR, WHILE STOPPED FOR A FEW MINUTES IN TRAFFIC: WOULD NEVER DOZE
HOW LIKELY ARE YOU TO NOD OFF OR FALL ASLEEP WHILE LYING DOWN TO REST IN THE AFTERNOON WHEN CIRCUMSTANCES PERMIT: WOULD NEVER DOZE

## 2025-04-30 ENCOUNTER — OFFICE VISIT (OUTPATIENT)
Dept: PULMONOLOGY | Age: 75
End: 2025-04-30
Payer: MEDICARE

## 2025-04-30 VITALS
OXYGEN SATURATION: 98 % | HEART RATE: 62 BPM | HEIGHT: 71 IN | BODY MASS INDEX: 33.15 KG/M2 | DIASTOLIC BLOOD PRESSURE: 84 MMHG | SYSTOLIC BLOOD PRESSURE: 126 MMHG | WEIGHT: 236.8 LBS

## 2025-04-30 DIAGNOSIS — I10 ESSENTIAL HYPERTENSION: ICD-10-CM

## 2025-04-30 DIAGNOSIS — G47.33 OSA ON CPAP: Primary | ICD-10-CM

## 2025-04-30 DIAGNOSIS — I48.91 ATRIAL FIBRILLATION, UNSPECIFIED TYPE (HCC): ICD-10-CM

## 2025-04-30 PROCEDURE — 3079F DIAST BP 80-89 MM HG: CPT | Performed by: STUDENT IN AN ORGANIZED HEALTH CARE EDUCATION/TRAINING PROGRAM

## 2025-04-30 PROCEDURE — G2211 COMPLEX E/M VISIT ADD ON: HCPCS | Performed by: STUDENT IN AN ORGANIZED HEALTH CARE EDUCATION/TRAINING PROGRAM

## 2025-04-30 PROCEDURE — 3074F SYST BP LT 130 MM HG: CPT | Performed by: STUDENT IN AN ORGANIZED HEALTH CARE EDUCATION/TRAINING PROGRAM

## 2025-04-30 PROCEDURE — 1159F MED LIST DOCD IN RCRD: CPT | Performed by: STUDENT IN AN ORGANIZED HEALTH CARE EDUCATION/TRAINING PROGRAM

## 2025-04-30 PROCEDURE — 99214 OFFICE O/P EST MOD 30 MIN: CPT | Performed by: STUDENT IN AN ORGANIZED HEALTH CARE EDUCATION/TRAINING PROGRAM

## 2025-04-30 PROCEDURE — 1124F ACP DISCUSS-NO DSCNMKR DOCD: CPT | Performed by: STUDENT IN AN ORGANIZED HEALTH CARE EDUCATION/TRAINING PROGRAM

## 2025-04-30 RX ORDER — HYDROCORTISONE 25 MG/G
CREAM TOPICAL
COMMUNITY
Start: 2025-03-18

## 2025-04-30 RX ORDER — ASPIRIN 81 MG/1
TABLET ORAL
COMMUNITY

## 2025-04-30 NOTE — PATIENT INSTRUCTIONS
ERIKA education:    You have sleep apnea:      Untreated sleep apnea is associated with increased risk of hypertension, cardiac disease, myocardial infarction, stroke, and poor blood sugar control. Treating your ERIKA can decrease these risks.    Weight loss does improve sleep apnea. It is important to have a healthy diet and an exercise program.     Alcohol and sedating medicine can make sleep apnea worse and should be avoided in particular before sleep.    If you have surgery or are hospitalized, tell your doctor that you have sleep apnea and bring your CPAP to the hospital.    If you are drowsy or sleepy, you should not drive. If you are driving and become drowsy or sleepy, you should pull over to a safe place. Below are our drowsy driving tips.     PAP machine care:   You should clean your PAP regularly (see your PAP  site for more details)  Daily cleaning   1. Wipe mask with a damp towel with mild detergent, rinse with a damp towel and let air dry. You can also see CPAP cleaning wipes. Avoid harsh cleaning wipes or chemicals.    2. Humidifier- empty water daily. Fill with clean distilled water before use before sleep.    Weekly Cleaning   1. Clean mask, headgear, and tubing weekly  2. Fill your sink with warm water and a few drops of mild dish soap. Swirl equipment for at least 5 minutes. Rinse well and air dry. Hang hose over something so the water droplets drip out.   3. Clean filter- rinse with warm water, squeeze excess water and blot dry with towel. If there is a white filter (respironics machine)- do not wash that - it is disposable and should be changed once a month.   4. Humidifier- Disinfect in solution that is one part vinegar and 5 parts water for 30 min. You can also put it in the top rack of  to clean.     Ask your medical equipment company about renewal of your supplies. At the very least- this should be done once a year.       Weight Loss  Weight Loss is an important part of

## 2025-04-30 NOTE — PROGRESS NOTES
Ordering Provider:   John Knox MD - Diplomate, [facility] Sleep Medicine  [address] [place] [address], [place] [address]    Phone [phone]  Fax [phone]    Diagnosis: [x] ERIKA  (G47.33) [] CSA (G47.31) []  Other:__________________   Length of Need: [] 13 months [x]  99 Months                                          []  Other:__________________   Machine (JOURDAN): [] Respironics Auto (with modem for remote monitoring)       [] Other:____________________    []  ResMed Auto (with modem for remote monitoring)    []  CPAP () [] Bilevel ()   Mode: Mode:   [] Auto [] Fixed [] Auto [] Spontaneous   Pmin:_________cmH2O      Pmax:_________cmH2O   P:_________cmH2O    EPAPmin:__________cmH2O IPAP:__________cmH2O     IPAPmax:__________cmH2O EPAP:__________cmH2O     PSmin:_______  PSmax:_______       (ResMed) PS:_________     Flex/EPR - 3 full time                          Ramp time: 30 min Flex/EPR - 3 full time                 Ramp time: 30 min   Ramp Pressure:___________cmH2O Ramp Pressure:____________cmH2O         Humidifier: [x] Heated ()                                               [] Passive     [x] Water chamber replacement ()/ 1 per 6 months   Mask:   [x] Nasal () /1 per 3 months [] Full Face () /1 per 3 months   [x] Patient choice -Size and fit mask [] Patient Choice - Size and fit mask   [x] Dispense: nasal cushion  [] Dispense:  [] Dispense:    [x] Headgear () / 1 per 3 months [] Headgear () / 1 per 3 months   [x] Replacement Nasal Cushion ()/2 per month [] Interface Replacement ()/1 per month   [] Replacement Nasal Pillows ()/2 per month       Tubing: [x] Heated ()/1 per 3 months                           [] Standard ()/1 per 3 months  [] Other:____________________   Filters: [x] Non-disposable ()/1 per 6 months                 [x] Ultra-Fine, Disposable ()/2 per month     Miscellaneous: [x] Chin Strap

## 2025-04-30 NOTE — PROGRESS NOTES
[facility]  Sleep Medicine  [address] [place] [address], [address]  [place] [address]    Chief Complaint   Patient presents with    Hypersomni    Sleep Apnea         Carlos Salazar comes in today for sleep apnea follow-up . He was diagnosed with severe obstructive sleep apnea and is being treated with PAP therapy.   He has been on PAP therapy for over a year.  He states he wears the PAP device every night.     He falls asleep in  within 15 minutes.  He awakens 1-2 times per night (improved from previously) and takes no naps. The average total amount of sleep is about 8 hours per night. He does not use sleep aid/s. Symptoms of sleep apnea have improved since using PAP therapy.  He is not snoring with the machine on.  He does complain of mask fit / discomfort issues.   DME: [facility]  Mask: Airfit N20  Machine: ResMed Airsense 11 Autoset        DATA REVIEWED TODAY:     Diagnostic Review  HST on 2024 showed respiratory event index of 31.8/h with oxygen desaturation down to a burt of 81%.     [place]           2025    12:00 PM 2024    11:36 AM 1/10/2024     9:02 AM   Sleep Medicine   Sitting and reading 0 1 2   Watching TV 2 2 2   Sitting, inactive in a public place (e.g. a theatre or a meeting) 0 0 0   As a passenger in a car for an hour without a break 0 0 1   Lying down to rest in the afternoon when circumstances permit 0 0 3   Sitting and talking to someone 0 0 1   Sitting quietly after a lunch without alcohol 0 1 1   In a car, while stopped for a few minutes in traffic 0 0 0   [place] Sleepiness Score 2  4 10   Neck (Inches)   18       Patient-reported       PAP Adherence (dates: 2025 - 2025)  Total days used: 90/90  % used days >= 4 hours: 100%  Average hours used per day: 7 hrs 57 mins  Mode: auto CPAP  Pressure settin-15 cmH2O  Average pressure (95%): 9.8 cmH2O  Leaks (95%): 6.6 L/min  Residual AHI: 0.2      Medications   He has a current medication list which

## 2025-04-30 NOTE — PROGRESS NOTES
[facility]   Electrophysiology  PONCHO Paulino-CNP  Attending EP: Dr. Flower    Date: 5/30/2025  I had the privilege of visiting Carlos Salazar in the office.     Chief Complaint:   Chief Complaint   Patient presents with    1 Year Follow Up     No c/c     History of Present Illness: History obtained from patient and medical record.    Carlos Salazar is 74 y.o. male with a past medical history of CAD, HTN, HLD, MR, and AF. S/p DCCV (6/13/23, 2/12/25). S/p PFA with PVI, roof/superior/posterior line (3/7/25)    -Interval history: Today, Carlos Salazar is being seen for annual visit. He is doing well and feels much better since his ablation. He remains in sinus rhythm with stable BP. He denies any recurrent atrial fibrillation. He is now being followed for a breast nodule and needs a biopsy in a couple weeks    Denies having chest pain, palpitations, shortness of breath, orthopnea/PND, cough, or dizziness at the time of this visit.    With regard to medication therapy the patient has been compliant with prescribed regimen. They have tolerated therapy to date.     Assessment:    1.Persistent Atrial Fibrillation  - Hx of numerous cardioversions. S/p PFA with PVI, roof/superior/posterior line (3/7/25)    - Currently in NSR  - Continue coreg (1.5 tablets BID of 6.25 mg)  - [id number] score:high ; [id number] Vasc score and anticoagulation discussed. High risk for stroke and thromboembolism. Anticoagulation is recommended. Risk of bleeding was discussed.  ~ Continue Eliquis 5 mg BID. Monitor for bleeding    - Afib risk factors including age, HTN, obesity, inactivity and ERIKA were discussed with patient. Risk factor modification recommended      - S/p ablation in March. He is doing well. Recommend he work on lifestyle modification and weight loss    2. Bradycardia   - Noted on holter, mostly at night   - Ok to continue BB at current dose    3. CAD  - Stable  - No complaints of angina  - Continue ASA, ACEI,

## 2025-05-08 ENCOUNTER — OFFICE VISIT (OUTPATIENT)
Dept: ORTHOPEDIC SURGERY | Age: 75
End: 2025-05-08
Payer: MEDICARE

## 2025-05-08 VITALS — BODY MASS INDEX: 36.82 KG/M2 | HEIGHT: 71 IN | WEIGHT: 263 LBS

## 2025-05-08 DIAGNOSIS — M25.561 ACUTE PAIN OF RIGHT KNEE: Primary | ICD-10-CM

## 2025-05-08 PROCEDURE — 99213 OFFICE O/P EST LOW 20 MIN: CPT | Performed by: ORTHOPAEDIC SURGERY

## 2025-05-08 PROCEDURE — 1159F MED LIST DOCD IN RCRD: CPT | Performed by: ORTHOPAEDIC SURGERY

## 2025-05-08 PROCEDURE — 1125F AMNT PAIN NOTED PAIN PRSNT: CPT | Performed by: ORTHOPAEDIC SURGERY

## 2025-05-08 PROCEDURE — 1124F ACP DISCUSS-NO DSCNMKR DOCD: CPT | Performed by: ORTHOPAEDIC SURGERY

## 2025-05-08 NOTE — PROGRESS NOTES
CHIEF COMPLAINT: Right knee pain.    History:   Carlos Salazar is a 74 y.o. male self-referred for evaluation and treatment of right knee pain / injury. The patient complains of right knee pain.   This is evaluated as a personal injury.   The pain began 1 month ago.  There was a history of injury.  He was stepping off a curb and thinks he caught his heel, causing him to fall and land on his right knee.  Rates pain 4-8/10.   The pain is located anteriorly.  Symptoms are worse with knee flexion, getting up from sitting, stairs.  The knee has not given out or felt unstable. The patient cannot bend and straighten the knee fully. There is swelling in the knee.   There was not catching / locking of the knee.   The patient has not had PT. The patient has not had an injection.   He has used Aspercreme and Voltaren gel with relief.      Past Medical History:   Diagnosis Date    ASHD (arteriosclerotic heart disease)     Cancer ([facility])     skin    Cold sore 10/2016    Colon polyp     COPD (chronic obstructive pulmonary disease) ([facility]) 2006    NO ISSUES IN LAST 10 YEARS- QUIT SMOKING    History of blood transfusion     Hyperlipidemia     Hypertension     Kidney stone     Medial meniscus tear     RIGHT    MI (myocardial infarction) ([facility]) 01/2000    Obesity (BMI 30.0-34.9) 9/22/2016    Peyronie's disease 7/28/2011    PONV (postoperative nausea and vomiting)     Psoriasis     PUD (peptic ulcer disease)     Sinus infection 10/2016    Transfusion of, blood     pt has a history of receiving blood or blood product transfusion(s)       Past Surgical History:   Procedure Laterality Date    ANGIOPLASTY  2000    STENT X1    COLONOSCOPY      several    COLONOSCOPY  01/15/2020    COLONOSCOPY POLYPECTOMY SNARE/COLD BIOPSY performed by Adalid Delcid MD at [facility] ENDOSCOPY    DIAGNOSTIC CARDIAC CATH LAB PROCEDURE  5/17/2023    EP DEVICE PROCEDURE N/A 3/7/2025    Ablation A-fib w complete ep study performed by Rob Flower MD at

## 2025-05-09 ENCOUNTER — TELEPHONE (OUTPATIENT)
Dept: ORTHOPEDIC SURGERY | Age: 75
End: 2025-05-09

## 2025-05-09 NOTE — TELEPHONE ENCOUNTER
MRI is authorized for Luisa Zelaya with patient re: MRI authorization. Patient is advised that authorization is valid through 11/4/25 and to call [phone] to schedule the MRI. An in office appointment will be required a minimum of three business days after the MRI to obtain results and treatment options.  Patient said he was going to give it a week to see if it felt any better before he scheduled it.   Patient verbalized understanding and agreed.

## 2025-05-13 ENCOUNTER — TELEPHONE (OUTPATIENT)
Dept: FAMILY MEDICINE CLINIC | Age: 75
End: 2025-05-13

## 2025-05-13 NOTE — TELEPHONE ENCOUNTER
Patient called has made an appointment on May 20th 2025      For Painful mammary glands.     Patient is    furosemide (LASIX) 40 MG tablet [[id number]]  Dr Ulloa office  he isn't sure if it is a reaction like he had with the       spironolactone (ALDACTONE) 25 MG tablet [[id number]]  DISCONTINUED        His nipples are tender to touch.       Pharm is [facility] on [address]   Patient would like a call back to see what Dr. Munoz  advise would be or if there are any tests that he needs.

## 2025-05-14 ENCOUNTER — HOSPITAL ENCOUNTER (OUTPATIENT)
Dept: MRI IMAGING | Age: 75
Discharge: HOME | End: 2025-05-14
Attending: ORTHOPAEDIC SURGERY
Payer: MEDICARE

## 2025-05-14 DIAGNOSIS — M25.561 ACUTE PAIN OF RIGHT KNEE: ICD-10-CM

## 2025-05-14 PROCEDURE — 73721 MRI JNT OF LWR EXTRE W/O DYE: CPT

## 2025-05-18 SDOH — ECONOMIC STABILITY: TRANSPORTATION INSECURITY
IN THE PAST 12 MONTHS, HAS THE LACK OF TRANSPORTATION KEPT YOU FROM MEDICAL APPOINTMENTS OR FROM GETTING MEDICATIONS?: NO

## 2025-05-18 SDOH — ECONOMIC STABILITY: INCOME INSECURITY: IN THE LAST 12 MONTHS, WAS THERE A TIME WHEN YOU WERE NOT ABLE TO PAY THE MORTGAGE OR RENT ON TIME?: NO

## 2025-05-18 SDOH — ECONOMIC STABILITY: FOOD INSECURITY: WITHIN THE PAST 12 MONTHS, YOU WORRIED THAT YOUR FOOD WOULD RUN OUT BEFORE YOU GOT MONEY TO BUY MORE.: NEVER TRUE

## 2025-05-18 SDOH — ECONOMIC STABILITY: FOOD INSECURITY: WITHIN THE PAST 12 MONTHS, THE FOOD YOU BOUGHT JUST DIDN'T LAST AND YOU DIDN'T HAVE MONEY TO GET MORE.: NEVER TRUE

## 2025-05-18 ASSESSMENT — PATIENT HEALTH QUESTIONNAIRE - PHQ9
2. FEELING DOWN, DEPRESSED OR HOPELESS: NOT AT ALL
SUM OF ALL RESPONSES TO PHQ9 QUESTIONS 1 & 2: 0
SUM OF ALL RESPONSES TO PHQ QUESTIONS 1-9: 0
2. FEELING DOWN, DEPRESSED OR HOPELESS: NOT AT ALL
1. LITTLE INTEREST OR PLEASURE IN DOING THINGS: NOT AT ALL
SUM OF ALL RESPONSES TO PHQ QUESTIONS 1-9: 0
1. LITTLE INTEREST OR PLEASURE IN DOING THINGS: NOT AT ALL
SUM OF ALL RESPONSES TO PHQ QUESTIONS 1-9: 0
SUM OF ALL RESPONSES TO PHQ QUESTIONS 1-9: 0

## 2025-05-18 NOTE — PROGRESS NOTES
Q10 400 MG CAPS Take by mouth Yes ProviderSrinivasa MD   cetirizine (ZYRTEC) 10 MG tablet Take 1 tablet by mouth every evening Yes ProviderSrinivasa MD   therapeutic multivitamin-minerals (THERAGRAN-M) tablet Take 1 tablet by mouth daily Yes ProviderSrinivasa MD       Past Medical History:   Diagnosis Date    ASHD (arteriosclerotic heart disease)     Cancer ([facility])     skin    Cold sore 10/2016    Colon polyp     COPD (chronic obstructive pulmonary disease) ([facility])     NO ISSUES IN LAST 10 YEARS- QUIT SMOKING    History of blood transfusion     Hyperlipidemia     Hypertension     Kidney stone     Medial meniscus tear     RIGHT    MI (myocardial infarction) ([facility]) 2000    Obesity (BMI 30.0-34.9) 2016    Peyronie's disease 2011    PONV (postoperative nausea and vomiting)     Psoriasis     PUD (peptic ulcer disease)     Sinus infection 10/2016    Transfusion of, blood     pt has a history of receiving blood or blood product transfusion(s)       Social History     Tobacco Use    Smoking status: Former     Current packs/day: 0.00     Average packs/day: 1 pack/day for 45.0 years (45.0 ttl pk-yrs)     Types: Cigarettes     Start date: 4/15/1964     Quit date: 4/15/2009     Years since quittin.1    Smokeless tobacco: Never    Tobacco comments:     quit 2009.  Previously smoked <1/2PPD   Vaping Use    Vaping status: Never Used   Substance Use Topics    Alcohol use: Yes     Alcohol/week: 2.0 standard drinks of alcohol     Types: 2 Glasses of wine per week     Comment: minimal    Drug use: No       Family History   Problem Relation Age of Onset    Cancer Father         bladder    Hypertension Father     Heart Disease Father     High Blood Pressure Father     High Cholesterol Father     Heart Disease Mother     High Blood Pressure Mother     High Cholesterol Mother     Heart Attack Mother        Allergies   Allergen Reactions    Adhesive Tape Other (See Comments)     \"skin turns red\"

## 2025-05-20 ENCOUNTER — OFFICE VISIT (OUTPATIENT)
Dept: FAMILY MEDICINE CLINIC | Age: 75
End: 2025-05-20

## 2025-05-20 VITALS
BODY MASS INDEX: 36.51 KG/M2 | HEART RATE: 62 BPM | OXYGEN SATURATION: 95 % | DIASTOLIC BLOOD PRESSURE: 82 MMHG | SYSTOLIC BLOOD PRESSURE: 112 MMHG | WEIGHT: 260.8 LBS | HEIGHT: 71 IN

## 2025-05-20 DIAGNOSIS — E78.2 MIXED HYPERLIPIDEMIA: ICD-10-CM

## 2025-05-20 DIAGNOSIS — N63.12 MASS OF UPPER INNER QUADRANT OF RIGHT BREAST: ICD-10-CM

## 2025-05-20 DIAGNOSIS — I25.10 CORONARY ARTERY DISEASE INVOLVING NATIVE CORONARY ARTERY OF NATIVE HEART WITHOUT ANGINA PECTORIS: ICD-10-CM

## 2025-05-20 DIAGNOSIS — E66.01 MORBID (SEVERE) OBESITY DUE TO EXCESS CALORIES (HCC): ICD-10-CM

## 2025-05-20 DIAGNOSIS — I10 ESSENTIAL HYPERTENSION: ICD-10-CM

## 2025-05-20 DIAGNOSIS — G47.33 OSA ON CPAP: ICD-10-CM

## 2025-05-20 DIAGNOSIS — I48.91 ATRIAL FIBRILLATION WITH SLOW VENTRICULAR RESPONSE (HCC): Primary | ICD-10-CM

## 2025-05-20 RX ORDER — NYSTATIN AND TRIAMCINOLONE ACETONIDE 100000; 1 [USP'U]/G; MG/G
CREAM TOPICAL
Qty: 30 G | Refills: 3 | Status: SHIPPED | OUTPATIENT
Start: 2025-05-20

## 2025-05-21 ENCOUNTER — TELEPHONE (OUTPATIENT)
Dept: WOMENS IMAGING | Age: 75
End: 2025-05-21

## 2025-05-21 DIAGNOSIS — N63.10 MASS OF RIGHT BREAST, UNSPECIFIED QUADRANT: Primary | ICD-10-CM

## 2025-05-28 ENCOUNTER — TELEPHONE (OUTPATIENT)
Dept: ADMINISTRATIVE | Age: 75
End: 2025-05-28

## 2025-05-28 ENCOUNTER — HOSPITAL ENCOUNTER (OUTPATIENT)
Dept: ULTRASOUND IMAGING | Age: 75
Discharge: HOME | End: 2025-05-28
Payer: MEDICARE

## 2025-05-28 ENCOUNTER — RESULTS FOLLOW-UP (OUTPATIENT)
Dept: FAMILY MEDICINE CLINIC | Age: 75
End: 2025-05-28

## 2025-05-28 ENCOUNTER — PRE-PROCEDURE TELEPHONE (OUTPATIENT)
Dept: WOMENS IMAGING | Age: 75
End: 2025-05-28

## 2025-05-28 ENCOUNTER — HOSPITAL ENCOUNTER (OUTPATIENT)
Dept: WOMENS IMAGING | Age: 75
Discharge: HOME | End: 2025-05-28
Payer: MEDICARE

## 2025-05-28 VITALS — BODY MASS INDEX: 35.7 KG/M2 | HEIGHT: 71 IN | WEIGHT: 255 LBS

## 2025-05-28 DIAGNOSIS — Z87.898 HISTORY OF LUMP OF RIGHT BREAST: Primary | ICD-10-CM

## 2025-05-28 DIAGNOSIS — R92.8 ABNORMAL MAMMOGRAM: ICD-10-CM

## 2025-05-28 DIAGNOSIS — N63.12 MASS OF UPPER INNER QUADRANT OF RIGHT BREAST: ICD-10-CM

## 2025-05-28 PROCEDURE — 76642 ULTRASOUND BREAST LIMITED: CPT

## 2025-05-28 PROCEDURE — G0279 TOMOSYNTHESIS, MAMMO: HCPCS

## 2025-05-28 NOTE — TELEPHONE ENCOUNTER
Submitted PA for Nystatin-Triamcinolone 029541-3.1UNIT/GM-% cream   Via [facility] Key: [id number] STATUS: PENDING.    Follow up done daily; if no decision with in three days we will refax.  If another three days goes by with no decision will call the insurance for status.

## 2025-05-29 ENCOUNTER — TELEPHONE (OUTPATIENT)
Dept: FAMILY MEDICINE CLINIC | Age: 75
End: 2025-05-29

## 2025-05-29 NOTE — TELEPHONE ENCOUNTER
The medication was DENIED; DENIAL letter is uploaded to MEDIA.    Generic Denial: Drug is not covered by the plan ( Plan Exclusion)  and  Other; please see Denial Letter.     Note :    The requested drug is not on your plans formulary (list of covered drugs).     If this requires a response please respond to the pool ( P MHCX PSC MEDICATION PRE-AUTH).      Thank you please advise patient.

## 2025-05-29 NOTE — TELEPHONE ENCOUNTER
The patient agreed to pay out of pocket for the tube of nystatin-triamcinolone medication due to the non coverage by insurance     The pharmacy questioned if this is what you wanted prescribed

## 2025-05-30 ENCOUNTER — OFFICE VISIT (OUTPATIENT)
Dept: CARDIOLOGY CLINIC | Age: 75
End: 2025-05-30

## 2025-05-30 VITALS
HEIGHT: 71 IN | WEIGHT: 258 LBS | BODY MASS INDEX: 36.12 KG/M2 | HEART RATE: 60 BPM | SYSTOLIC BLOOD PRESSURE: 110 MMHG | OXYGEN SATURATION: 98 % | DIASTOLIC BLOOD PRESSURE: 80 MMHG

## 2025-05-30 DIAGNOSIS — E66.811 OBESITY (BMI 30.0-34.9): ICD-10-CM

## 2025-05-30 DIAGNOSIS — I10 ESSENTIAL HYPERTENSION: ICD-10-CM

## 2025-05-30 DIAGNOSIS — I50.33 ACUTE ON CHRONIC DIASTOLIC (CONGESTIVE) HEART FAILURE (HCC): ICD-10-CM

## 2025-05-30 DIAGNOSIS — G47.33 OSA ON CPAP: ICD-10-CM

## 2025-05-30 DIAGNOSIS — I48.19 ATRIAL FIBRILLATION, PERSISTENT (HCC): Primary | ICD-10-CM

## 2025-05-30 DIAGNOSIS — I25.10 CORONARY ARTERY DISEASE INVOLVING NATIVE CORONARY ARTERY OF NATIVE HEART WITHOUT ANGINA PECTORIS: ICD-10-CM

## 2025-05-30 DIAGNOSIS — E78.2 MIXED HYPERLIPIDEMIA: ICD-10-CM

## 2025-06-13 ENCOUNTER — HOSPITAL ENCOUNTER (OUTPATIENT)
Dept: ULTRASOUND IMAGING | Age: 75
Discharge: HOME | End: 2025-06-13
Payer: MEDICARE

## 2025-06-13 ENCOUNTER — HOSPITAL ENCOUNTER (OUTPATIENT)
Dept: WOMENS IMAGING | Age: 75
Discharge: HOME | End: 2025-06-13
Payer: MEDICARE

## 2025-06-13 DIAGNOSIS — Z87.898 HISTORY OF LUMP OF RIGHT BREAST: ICD-10-CM

## 2025-06-13 PROCEDURE — 6360000002 HC RX W HCPCS: Performed by: RADIOLOGY

## 2025-06-13 PROCEDURE — 2709999900 US BREAST BIOPSY W LOC DEVICE 1ST LESION RIGHT

## 2025-06-13 PROCEDURE — 77065 DX MAMMO INCL CAD UNI: CPT

## 2025-06-13 RX ORDER — LIDOCAINE HYDROCHLORIDE AND EPINEPHRINE 10; 10 MG/ML; UG/ML
20 INJECTION, SOLUTION INFILTRATION; PERINEURAL ONCE
Status: COMPLETED | OUTPATIENT
Start: 2025-06-13 | End: 2025-06-13

## 2025-06-13 RX ORDER — LIDOCAINE HYDROCHLORIDE 10 MG/ML
5 INJECTION, SOLUTION EPIDURAL; INFILTRATION; INTRACAUDAL; PERINEURAL ONCE
Status: COMPLETED | OUTPATIENT
Start: 2025-06-13 | End: 2025-06-13

## 2025-06-13 RX ADMIN — LIDOCAINE HYDROCHLORIDE,EPINEPHRINE BITARTRATE 20 ML: 10; .01 INJECTION, SOLUTION INFILTRATION; PERINEURAL at 13:17

## 2025-06-13 RX ADMIN — LIDOCAINE HYDROCHLORIDE ANHYDROUS 5 ML: 10 INJECTION, SOLUTION INFILTRATION at 13:17

## 2025-06-16 ENCOUNTER — RESULTS FOLLOW-UP (OUTPATIENT)
Dept: FAMILY MEDICINE CLINIC | Age: 75
End: 2025-06-16

## 2025-06-16 ENCOUNTER — TELEPHONE (OUTPATIENT)
Dept: WOMENS IMAGING | Age: 75
End: 2025-06-16

## 2025-07-27 ENCOUNTER — TRANSCRIBE ORDERS (OUTPATIENT)
Dept: ADMINISTRATIVE | Age: 75
End: 2025-07-27

## 2025-07-27 DIAGNOSIS — N50.812 TESTICULAR PAIN, LEFT: Primary | ICD-10-CM

## 2025-08-08 ENCOUNTER — HOSPITAL ENCOUNTER (OUTPATIENT)
Dept: ULTRASOUND IMAGING | Age: 75
Discharge: HOME | End: 2025-08-08
Payer: MEDICARE

## 2025-08-08 ENCOUNTER — HOSPITAL ENCOUNTER (OUTPATIENT)
Dept: CT IMAGING | Age: 75
Discharge: HOME | End: 2025-08-08
Payer: MEDICARE

## 2025-08-08 DIAGNOSIS — N50.812 TESTICULAR PAIN, LEFT: ICD-10-CM

## 2025-08-08 PROCEDURE — 74176 CT ABD & PELVIS W/O CONTRAST: CPT

## 2025-08-08 PROCEDURE — 76870 US EXAM SCROTUM: CPT

## (undated) DEVICE — SNARE ENDOSCP L240CM SHTH DIA24MM LOOP W10MM POLYP RND REINF

## (undated) DEVICE — ELECTRODE PT RET AD L9FT HI MOIST COND ADH HYDRGEL CORDED

## (undated) DEVICE — SET VLV 3 PC AWS DISPOSABLE GRDIAN SCOPEVALET

## (undated) DEVICE — CATHETER EP 5FR L110CM ELECTRD TIP L1MM SPC 2-5-2MM M CRV

## (undated) DEVICE — INTRODUCER SHTH DIA6FR CANN L23CM GRN VASC CATH W/O MINI

## (undated) DEVICE — SOLUTION IV IRRIG WATER 500ML POUR BRL ST 2F7113

## (undated) DEVICE — PERCUTANEOUS ENTRY THINWALL NEEDLE  ONE-PART: Brand: COOK

## (undated) DEVICE — INTRODUCER SHTH DIA8FR CANN L23CM BLU VASC CATH W/O MINI

## (undated) DEVICE — STEERABLE SHEATH CLEAR: Brand: FARADRIVE™

## (undated) DEVICE — CATHETER US 8FR L90CM GRN TIP OVERLAY FOR GE-VIVID I VIVID

## (undated) DEVICE — PROCEDURE KIT ENDOSCP CUST

## (undated) DEVICE — GUIDEWIRE VASC L180CM DIA0.035IN L7CM DIA3MM J TIP PTFE S

## (undated) DEVICE — GUIDEWIRE VASC L80CM DIA0.035IN TIP L3MM PTFE J TIP FIX COR

## (undated) DEVICE — PAD, DEFIB, ADULT, RADIOTRANS, PHYSIO: Brand: MEDLINE

## (undated) DEVICE — PERCLOSE™ PROSTYLE™ SUTURE-MEDIATED CLOSURE AND REPAIR SYSTEM: Brand: PERCLOSE™ PROSTYLE™

## (undated) DEVICE — TUBING PMP FOR CARTO SYS SMARTABLATE

## (undated) DEVICE — 1 X VERSACROSS CONNECT TRANSSEPTAL DILATOR;  1 X VERSACROSS RF WIRE (INCLUDING 1 X CONNECTOR CABLE (SINGLE USE)): Brand: VERSACROSS CONNECT ACCESS SOLUTION FOR FARADRIVE

## (undated) DEVICE — DILATOR: Brand: COOK

## (undated) DEVICE — TRAP SPEC RETRV CLR PLAS POLYP IN LN SUCT QUIK CTCH

## (undated) DEVICE — CATHETER EP 7FR L115CM 2-6-2MM SPC 22 ELECTRD F CRV

## (undated) DEVICE — BW-412T DISP COMBO CLEANING BRUSH: Brand: SINGLE USE COMBINATION CLEANING BRUSH

## (undated) DEVICE — 3M™ RED DOT™ REPOSITIONABLE MONITORING ELECTRODE 2670-5, 5/BAG, 200/CASE, 54/PLT: Brand: RED DOT™

## (undated) DEVICE — PROBE COVER KIT: Brand: MEDLINE INDUSTRIES, INC.

## (undated) DEVICE — PULSED FIELD ABLATION CATHETER: Brand: FARAWAVE™

## (undated) DEVICE — CATHETER CONNECTION CABLE: Brand: FARASTAR™

## (undated) DEVICE — CATH LAB PACK: Brand: MEDLINE INDUSTRIES, INC.